# Patient Record
Sex: MALE | Race: WHITE
[De-identification: names, ages, dates, MRNs, and addresses within clinical notes are randomized per-mention and may not be internally consistent; named-entity substitution may affect disease eponyms.]

---

## 2018-01-02 ENCOUNTER — HOSPITAL ENCOUNTER (INPATIENT)
Dept: HOSPITAL 31 - C.ER | Age: 78
LOS: 9 days | Discharge: HOME | DRG: 871 | End: 2018-01-11
Attending: FAMILY MEDICINE | Admitting: FAMILY MEDICINE
Payer: MEDICARE

## 2018-01-02 VITALS — BODY MASS INDEX: 26.9 KG/M2

## 2018-01-02 DIAGNOSIS — Z79.4: ICD-10-CM

## 2018-01-02 DIAGNOSIS — J18.9: ICD-10-CM

## 2018-01-02 DIAGNOSIS — Z95.0: ICD-10-CM

## 2018-01-02 DIAGNOSIS — J40: ICD-10-CM

## 2018-01-02 DIAGNOSIS — I48.2: ICD-10-CM

## 2018-01-02 DIAGNOSIS — E78.00: ICD-10-CM

## 2018-01-02 DIAGNOSIS — Z87.891: ICD-10-CM

## 2018-01-02 DIAGNOSIS — Z90.49: ICD-10-CM

## 2018-01-02 DIAGNOSIS — K22.10: ICD-10-CM

## 2018-01-02 DIAGNOSIS — K92.0: ICD-10-CM

## 2018-01-02 DIAGNOSIS — I10: ICD-10-CM

## 2018-01-02 DIAGNOSIS — I69.354: ICD-10-CM

## 2018-01-02 DIAGNOSIS — K29.70: ICD-10-CM

## 2018-01-02 DIAGNOSIS — E87.6: ICD-10-CM

## 2018-01-02 DIAGNOSIS — R65.20: ICD-10-CM

## 2018-01-02 DIAGNOSIS — N39.0: ICD-10-CM

## 2018-01-02 DIAGNOSIS — N28.1: ICD-10-CM

## 2018-01-02 DIAGNOSIS — R32: ICD-10-CM

## 2018-01-02 DIAGNOSIS — A41.89: Primary | ICD-10-CM

## 2018-01-02 DIAGNOSIS — I34.0: ICD-10-CM

## 2018-01-02 DIAGNOSIS — E11.9: ICD-10-CM

## 2018-01-02 DIAGNOSIS — K52.9: ICD-10-CM

## 2018-01-02 DIAGNOSIS — K56.7: ICD-10-CM

## 2018-01-02 LAB
ALBUMIN SERPL-MCNC: 4.4 G/DL (ref 3.5–5)
ALBUMIN/GLOB SERPL: 1.1 {RATIO} (ref 1–2.1)
ALT SERPL-CCNC: 13 U/L (ref 21–72)
AST SERPL-CCNC: 51 U/L (ref 17–59)
BASE EXCESS BLDV CALC-SCNC: -0.2 MMOL/L (ref 0–2)
BASE EXCESS BLDV CALC-SCNC: -11.2 MMOL/L (ref 0–2)
BASOPHILS # BLD AUTO: 0.1 K/UL (ref 0–0.2)
BASOPHILS NFR BLD: 0.3 % (ref 0–2)
BILIRUB UR-MCNC: NEGATIVE MG/DL
BUN SERPL-MCNC: 21 MG/DL (ref 9–20)
CALCIUM SERPL-MCNC: 8.9 MG/DL (ref 8.6–10.4)
EOSINOPHIL # BLD AUTO: 0 K/UL (ref 0–0.7)
EOSINOPHIL NFR BLD: 0.1 % (ref 0–4)
ERYTHROCYTE [DISTWIDTH] IN BLOOD BY AUTOMATED COUNT: 14.4 % (ref 11.5–14.5)
GFR NON-AFRICAN AMERICAN: 59
GLUCOSE UR STRIP-MCNC: (no result) MG/DL
HGB BLD-MCNC: 15.3 G/DL (ref 12–18)
LEUKOCYTE ESTERASE UR-ACNC: (no result) LEU/UL
LYMPHOCYTE: 8 % (ref 20–40)
LYMPHOCYTES # BLD AUTO: 1.7 K/UL (ref 1–4.3)
LYMPHOCYTES NFR BLD AUTO: 8.7 % (ref 20–40)
MAGNESIUM SERPL-MCNC: 1.8 MG/DL (ref 1.6–2.3)
MCH RBC QN AUTO: 26.6 PG (ref 27–31)
MCHC RBC AUTO-ENTMCNC: 33.2 G/DL (ref 33–37)
MCV RBC AUTO: 80.3 FL (ref 80–94)
MONOCYTE: 6 % (ref 0–10)
MONOCYTES # BLD: 1.8 K/UL (ref 0–0.8)
MONOCYTES NFR BLD: 9 % (ref 0–10)
NEUTROPHILS # BLD: 16 K/UL (ref 1.8–7)
NEUTROPHILS NFR BLD AUTO: 81.9 % (ref 50–75)
NEUTROPHILS NFR BLD AUTO: 85 % (ref 50–75)
NEUTS BAND NFR BLD: 1 % (ref 0–2)
NRBC BLD AUTO-RTO: 0 % (ref 0–2)
PCO2 BLDV: 33 MMHG (ref 40–60)
PCO2 BLDV: 37 MMHG (ref 40–60)
PH BLDV: 7.26 [PH] (ref 7.32–7.43)
PH BLDV: 7.42 [PH] (ref 7.32–7.43)
PH UR STRIP: 5 [PH] (ref 5–8)
PLATELET # BLD EST: NORMAL 10*3/UL
PLATELET # BLD: 155 K/UL (ref 130–400)
PMV BLD AUTO: 9.7 FL (ref 7.2–11.7)
PROT UR STRIP-MCNC: (no result) MG/DL
RBC # BLD AUTO: 5.75 MIL/UL (ref 4.4–5.9)
RBC # UR STRIP: (no result) /UL
RBC MORPH BLD: NORMAL
SP GR UR STRIP: 1.02 (ref 1–1.03)
SQUAMOUS EPITHIAL: < 1 /HPF (ref 0–5)
TOTAL CELLS COUNTED BLD: 100
TROPONIN I SERPL-MCNC: 0.04 NG/ML (ref 0–0.12)
URINE NITRATE: NEGATIVE
UROBILINOGEN UR-MCNC: NORMAL MG/DL (ref 0.2–1)
VENOUS BLOOD GAS PO2: 33 MM/HG (ref 30–55)
VENOUS BLOOD GAS PO2: 69 MM/HG (ref 30–55)
WBC # BLD AUTO: 19.5 K/UL (ref 4.8–10.8)

## 2018-01-02 RX ADMIN — HUMAN INSULIN SCH UNIT: 100 INJECTION, SOLUTION SUBCUTANEOUS at 17:24

## 2018-01-02 RX ADMIN — INSULIN GLARGINE SCH U: 100 INJECTION, SOLUTION SUBCUTANEOUS at 21:50

## 2018-01-02 RX ADMIN — Medication SCH MG: at 18:04

## 2018-01-02 RX ADMIN — HUMAN INSULIN SCH: 100 INJECTION, SOLUTION SUBCUTANEOUS at 21:40

## 2018-01-02 RX ADMIN — OMEGA-3-ACID ETHYL ESTERS SCH GM: 900 CAPSULE, LIQUID FILLED ORAL at 18:04

## 2018-01-02 NOTE — US
EXAM:

  US Retroperitoneal Limited, Renal



CLINICAL HISTORY:

  77 years old, male; Abnormal findings; Abnormal radiologic finding of the 

abdomen; Radiologic exam and body structure: CT; Additional info: Renal mass 

noted on CT chest



TECHNIQUE:

  Real-time ultrasound of the retroperitoneum (limited) with image 

documentation.



COMPARISON:

  No relevant prior studies available.



FINDINGS:

  Right kidney:  Normal echogenicity.  11.3 x 7.6 x 10.2 cm cyst.  1.3 x 1.3 x 

1.2 cm cyst.  No calculi.  No hydronephrosis.

  Left kidney:  Normal echogenicity.  1.3 x 1.0 x 0.9 cm cyst.  No calculi.  No 

hydronephrosis.

  Bladder:  Unremarkable.  Pre-void volume = 111 cc.

  Prostate:  3.2 x 2.6 x 3.8 cm in size.



IMPRESSION:     

1.  Renal cysts. If there remains clinical concern for renal mass, suggest MRI.

## 2018-01-02 NOTE — PCM.SEPTIC
<Davian Robledo R - Last Filed: 01/02/18 17:00>





Sepsis Progress Note





- Reassessment Type


Date of Evaluation: 01/02/18


Time of Evaluation: 13:00


Reassessment Type: Non-invasive reassessment





- Non Invasive Reassessment


Were the most recent vital sign reviewed: Yes


Vital Sign (Latest): 


 











Temp Pulse Resp BP Pulse Ox


 


 98.9 F   101 H  20   128/57 L  96 


 


 01/02/18 16:30  01/02/18 16:30  01/02/18 16:30  01/02/18 16:30  01/02/18 16:30








Cardiovascular: Yes: Tachycardia, Irregularly Irregular


Respiratory: Yes: Decreased Breath Sounds, Rhonchi


Capillary Refill: Normal (Less than 2 sec)


Pulses: Normal Radial, Normal Dorsalis Pedis, Normal Posterior Tibialis


Skin: Normal Color, Warm





- Invasive Reassessment (complete 2 of 4)


Was a Central Venous Pressure Measurement obtained within 6 Hours after the 

presentation of septic shock: No


Was a central venous oxygen measurement obtained within 6 hours after the 

presentation of septic shock: No


Was a bedside cardiovascular ultrasound performed within 6 hours after the 

presentation of septic shock: No


Was a passive leg raise performed or was a fluid challenge performed within 6 

hrs of the initial fluid bolus: No





<Juliann Bird V - Last Filed: 01/02/18 22:12>





Sepsis Progress Note





- Non Invasive Reassessment


Vital Sign (Latest): 


 











Temp Pulse Resp BP Pulse Ox


 


 99.1 F   110 H  18   140/71   93 L


 


 01/02/18 18:21  01/02/18 20:26  01/02/18 18:21  01/02/18 18:21  01/02/18 18:21











Attending/Attestation





- Attestation


I have personally seen and examined this patient.: Yes


I have fully participated in the care of the patient.: Yes


I have reviewed all pertinent clinical information, including history, physical 

exam and plan: Yes


Notes (Text): 


Patient given 2 IV abx in the ED on admission.


Lactate acid: 2.2--1.7


Patient was given 1 L NS bolus by ED at code sepsis


Patient started on gentle IV hydration given concern for CHF.


Infectious Disease: Dr. Quinteros on consult


Procalcitonin: 0.80


Pending cultures.

## 2018-01-02 NOTE — RAD
HISTORY:

fever  



COMPARISON:

No prior. 



FINDINGS:



LUNGS:

Shallow lung volumes



Limited visualization of the left costophrenic angle. Elsewhere clear 

lungs without consolidation



PLEURA:

Small left pleural effusion not excluded no large left pleural 

effusion suggested. No pneumothorax apparent.



CARDIOVASCULAR:

Cardiomegaly Position/ configuration of pacemaker

Satisfactory. Central pulmonary vasculature probable top-normal - 

given shallow inspiration 



OSSEOUS STRUCTURES:

No significant abnormalities.



VISUALIZED UPPER ABDOMEN:

Normal.



OTHER FINDINGS:

None.



IMPRESSION:

No consolidation 



cardiomegaly with limited visualization of the left costophrenic 

angle - sign here minimal left pleural effusion not excluded

## 2018-01-02 NOTE — C.PDOC
History Of Present Illness


78 y/o male with pacemaker implanted and history of DM and high cholesterol 

presents to ED with complaints of generalized weakness and subjective fever 

since yesterday. Patient denies chest pain, sob, nausea, vomiting or any other 

complaints at this time. 


Time Seen by Provider: 18 10:53


Chief Complaint (Nursing): Weakness/Neurological Deficit


History Per: Patient, Family


History/Exam Limitations: no limitations


Onset/Duration Of Symptoms: Days


Current Symptoms Are (Timing): Still Present





Past Medical History


Reviewed: Historical Data, Nursing Documentation, Vital Signs


Vital Signs: 


 Last Vital Signs











Temp  101.1 F H  18 12:03


 


Pulse  112 H  18 12:41


 


Resp  16   18 12:41


 


BP  127/60   18 12:41


 


Pulse Ox  93 L  18 12:41














- Medical History


PMH: Cardia Arrhythmia, CVA (with residual left side weakness), Diabetes, HTN, 

Hypercholesterolemia, Hyperlipidemia


Surgical History: Cholecystectomy, Pacemaker





- [facility] Procedures








SUTURE OF LIP LACERATION (08/16/15)








Family History: States: No Known Family Hx





- Social History


Hx Alcohol Use: No (Former drinker)


Hx Substance Use: No





- Immunization History


Hx Tetanus Toxoid Vaccination:  (Updated last year)


Hx Influenza Vaccination: No


Hx Pneumococcal Vaccination: No





Review Of Systems


Constitutional: Positive for: Fever.  Negative for: Chills


Gastrointestinal: Negative for: Abdominal Pain


Skin: Negative for: Rash


Neurological: Positive for: Weakness





Physical Exam





- Physical Exam


Additional Physical Exam Comments: 


Constitutional: No acute distress. WDWN. 


Head: Normocephalic.  Atraumatic.  


Eyes:  PERRL. EOMI. 


ENT:  Oral mucousa dry. Chapped lips 


Neck:  Supple.


Cardiovascular:  Occasional irregular beats. 


Chest: No tenderness. Pacemaker implanted to left upper chest wall 


Respiratory:  Clear to auscultation bilaterally. Poor inspiration 


GI:  Soft. Nontender. Nondistended.  No rebound. No guarding.


Back:  No CVA and no mid-line tenderness.


Musculoskeletal:  No tenderness or swelling of extremities.


Skin:  No rash. 


Neurologic:  Alert, no focal deficit. Left hand paralyzed. 








ED Course And Treatment





- Laboratory Results


Result Diagrams: 


 18 11:19





 18 11:19


ECG: Interpreted By Me, Viewed By Me


ECG Rhythm: Sinus Tachycardia


Interpretation Of ECG: Left axis deviation, Left ventricular hypertrophy with 

repolarization abnormality


Rate From EC (BPM)


O2 Sat by Pulse Oximetry: 93 (RA)


Pulse Ox Interpretation: Normal





Medical Decision Making


Medical Decision Making: 


Plan: ECG, Blood work, CXR, UA, Swallow test ordered





[place]: Patient's lactate 2.2 Code sepsis activated and protocol followed 





discussed with Dr Bird, ordered repeat lactate and influenza.  will admot to 

her in tele. 





Disposition


Discussed With Dr.: Juliann Bird


Doctor Will See Patient In The: Hospital





- Disposition


Disposition Time: 12:45


Condition: SERIOUS


Forms:  [facility] ([place])





- Clinical Impression


Clinical Impression: 


 Sepsis








- PA / NP / Resident Statement


MD/DO has reviewed & agrees with the documentation as recorded.





- Scribe Statement


The provider has reviewed the documentation as recorded by the Scribazam Walters





All medical record entries made by the Scribe were at my direction and 

personally dictated by me. I have reviewed the chart and agree that the record 

accurately reflects my personal performance of the history, physical exam, 

medical decision making, and the department course for this patient. I have 

also personally directed, reviewed, and agree with the discharge instructions 

and disposition.





Decision To Admit





- Pt Status Changed To:


Hospital Disposition Of: Inpatient





- Admit Certification


Admit to Inpatient:: After my assessment, the patient will require 

hospitalization for at least two midnights.  This is because of the severity of 

symptoms shown, intensity of services needed, and/or the medical risk in this 

patient being treated as an outpatient.





- InPatient:


Physician Admission Certification: I certify that this patient requires 2 or 

more midnights of care for the following reason:: tx for sepsis, iv antibiotics





- .


Bed Request Type: Telemetry


Admitting Physician: Juliann Bird


Patient Diagnosis: 


 Sepsis

## 2018-01-02 NOTE — CT
PROCEDURE:  CT Chest without contrast



HISTORY:

suspect pneumonia; fever



COMPARISON:

None.



TECHNIQUE:

Contiguous axial images were obtained through the chest without 

intravenous contrast enhancement. Sagittal and coronal 

reconstructions were performed.







Radiation dose (DLP): 397 mGy-cm. 



This CT exam was performed using one or more of the following dose 

reduction techniques: Automated exposure control, adjustment of the 

mA and/or kV according to patient size, and/or use of iterative 

reconstruction technique.



FINDINGS:



LUNGS:

Clear lungs. Visualized airway clear. Mild bibasilar peripheral 

subpleural septal lines noted - an element of interstitial lung 

disease inferred. No consolidative infiltrate appreciated. 



Minimal hypo ventilatory changes at both lung bases 



MEDIASTINUM:

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 

cm. Descending thoracic aorta also minimally prominent at 3.5 cm . 

Cardiomegaly.  No pericardial effusion Position/ configuration of 

pacemaker

unremarkable. No vascular congestion. No lymphadenopathy. 

Atherosclerotic aortic vascular disease 



PLEURA:

No pleural fluid. No pneumothorax.



BONES:

. 



No fracture. No destructive lesion. 



UPPER ABDOMEN:

Bilateral renal mostly hypodense masses - -the full extent and there 

characterisation - not completely assessed 



 Posterior right upper renal pole cortical 9 mm hyperdensity 

impossible hyperdense enter hemorrhagic right renal cyst.  Other 

including neoplastic renal mass is not excluded.  Another slightly 

less conspicuous hyperdense mass anterior right renal midpole (axis 

series 4, image 128) partially visualized nonspecific perirenal mild 

inflammatory changes in the chronicity is unknown 



Cholecystectomy clips 



Descending abdominal aortic atherosclerotic vascular calcifications 

-renal artery takeoffs and superior mesenteric and celiac artery 

takeoffs. 



OTHER FINDINGS:

None.



IMPRESSION:

No consolidative infiltrate suggested.Mild bibasilar peripheral 

subpleural septal lines noted - an element of interstitial lung 

disease inferred.



Bilateral renal masses -although most of the renal masses are 

hypodense, their  full extent is not included . There are least 2 

right renal masses that appear hyperdense.  As per this exam all 

renal masses are indeterminate and further evaluation is advised. 

Recommend comparison with earlier outside exams, if available, to 

assure the stability . If not consider follow-up renal ultrasound 

initially to address the 2 hyperdense lesions. Ultimately triple 

phase CT abdomen renal study may be needed probable. Left renal 

vascular type calcifications.  No gross hydronephrosis appreciated ; 

few small left parapelvic renal cysts possible. Bilateral nonspecific 

mild perirenal stranding inflammatory changes present 



Status postcholecystectomy

## 2018-01-02 NOTE — CP.PCM.HP
<Davian Robledo - Last Filed: 18 15:16>





History of Present Illness





- History of Present Illness


History of Present Illness: 


CC: "My father is weak and warm to the touch"





HPI: Mr Barillas is a 77 year old  male who was brought to the ER by 

family for weakness and subjective fever; his daughter provided the history of 

present illness. The patient has a past medical history of AFib, CVA with 

residual left sided weakness, DM, HTN, and HLD. Per daughter the patient took a 

shower yesterday and following the shower the patient began to have chills. He 

usually ambulates with a cane, however he was too weak to ambulate and spent 

much of the day in bed. He was also warm to the touch however a temperature was 

not taken at home. The patient's wife was sick with a cold a few days prior. He 

has urinary incontinence at baseline and had an episode of urinary incontinence 

yesterday as well. There was no cough, diarrhea, recent travel. There was no 

chest pain, abdominal pain, nausea, vomiting, no new focal deficits.





PMD: Dr Dent


PMHx: AFib, CVA in  w/ residual left sided weakness; DM, HTN, HLD


PSHx: Pacemaker implanted ; Cholecystectomy 


Allergies: NKA


SocialHx: 20 pack year smoking history - currently non-smoker; EtOH use in past 

- currently non-drinker; no illicit substances; lives at home with wife - 

caretaker comes home and assists in ADL


FamHx: Father , had an MI; Mother , had cardiac problems


Home medications (verified with PMD's office): Potassium 10meq QD; amlodipine 

10mg QD; amiodarone 100mg QD; benzepril 40mg QD; simvastatin 40mg HS; lovaza 2 

cap QD; lantus 22u HS; ASA 81mg QD; metoprolol sccinate 50mg QD; metformin 

1000mg BID; atorvastatin 20mg HS; januvia 100mg QD; oxybutynin 5mg ER QD; 

Glimepiride 4mg AM; 








Present on Admission





- Present on Admission


Any Indicators Present on Admission: No





Review of Systems





- Constitutional


Constitutional: Chills, Fever, Weakness





- EENT


Eyes: absent: Change in Vision


Ears: absent: Ear Pain


Nose/Mouth/Throat: Dry Mouth





- Cardiovascular


Cardiovascular: absent: Chest Pain, Chest Pain at Rest, Dyspnea





- Respiratory


Respiratory: absent: Cough, Wheezing





- Gastrointestinal


Gastrointestinal: absent: Abdominal Pain, Constipation, Diarrhea, Nausea, 

Vomiting





- Genitourinary


Genitourinary: absent: Dysuria





- Integumentary


Integumentary: absent: Bleeding Lesions





- Neurological


Neurological: absent: Dizziness





Past Patient History





- Past Social History


Smoking Status: Former Smoker





- CARDIAC


Hx Cardia Arrhythmia: Yes


Hx Hypercholesterolemia: Yes


Hx Hypertension: Yes


Hx Pacemaker: Yes





- PULMONARY


Hx Respiratory Disorders: No





- NEUROLOGICAL


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





- HEENT


Hx HEENT Problems: No





- RENAL


Hx Chronic Kidney Disease: No





- ENDOCRINE/METABOLIC


Hx Diabetes Mellitus Type 2: Yes





- HEMATOLOGICAL/ONCOLOGICAL


Hx Blood Disorders: No





- INTEGUMENTARY


Hx Dermatological Problems: No





- MUSCULOSKELETAL/RHEUMATOLOGICAL


Hx Musculoskeletal Disorders: No


Hx Falls: Yes





- GASTROINTESTINAL


Hx Gastrointestinal Disorders: No





- GENITOURINARY/GYNECOLOGICAL


Hx Genitourinary Disorders: No





- PSYCHIATRIC


Hx Substance Use: No





- SURGICAL HISTORY


Hx Cholecystectomy: Yes





- ANESTHESIA


Hx Anesthesia: Yes


Hx Anesthesia Reactions: No


Hx Malignant Hyperthermia: No





Meds


Allergies/Adverse Reactions: 


 Allergies











Allergy/AdvReac Type Severity Reaction Status Date / Time


 


No Known Allergies Allergy   Verified 18 10:33














Physical Exam





- Constitutional


Appears: Toxic, No Acute Distress





- Head Exam


Head Exam: ATRAUMATIC, NORMAL INSPECTION





- Eye Exam


Eye Exam: EOMI


Pupil Exam: PERRL





- ENT Exam


ENT Exam: absent: Normal Oropharynx


Additional comments: 


Yellow lesions covering tongue that can be easily wiped away to reveal a normal 

mucosa underneath








- Neck Exam


Neck exam: Positive for: Normal Inspection





- Respiratory Exam


Respiratory Exam: Decreased Breath Sounds, Rhonchi





- Cardiovascular Exam


Cardiovascular Exam: Tachycardia, Irregular Rhythm.  absent: JVD, Systolic 

Murmur





- GI/Abdominal Exam


GI & Abdominal Exam: Normal Bowel Sounds, Soft.  absent: Guarding, Rebound, 

Tenderness


Additional comments: 


Protuberant abdomen








- Extremities Exam


Extremities exam: Positive for: normal capillary refill.  Negative for: full ROM





- Neurological Exam


Neurological exam: Alert, Motor Sensory Deficit, Oriented x3


Additional comments: 


Right arm 5/5 motor strength


Left arm 0/5 motor strength


Right leg 4/5 motor strength


Left leg 3/5 motor strength





Sensation decreased in left arm and left leg





- Psychiatric Exam


Additional comments: 


aphasia








- Skin


Skin Exam: Intact, Normal Color, Warm





Results





- Vital Signs


Recent Vital Signs: 





 Last Vital Signs











Temp  101.1 F H  18 12:03


 


Pulse  112 H  18 12:41


 


Resp  16   18 12:41


 


BP  127/60   18 12:41


 


Pulse Ox  93 L  18 12:46














- Labs


Result Diagrams: 


 18 11:19





 18 11:19


Labs: 





 Laboratory Results - last 24 hr











  18





  11:19 11:19 11:26


 


WBC  19.5 H  


 


RBC  5.75  


 


Hgb  15.3  


 


Hct  46.2  


 


MCV  80.3  


 


MCH  26.6 L  


 


MCHC  33.2  


 


RDW  14.4  


 


Plt Count  155  


 


MPV  9.7  


 


Neut % (Auto)  81.9 H  


 


Lymph % (Auto)  8.7 L  


 


Mono % (Auto)  9.0  


 


Eos % (Auto)  0.1  


 


Baso % (Auto)  0.3  


 


Neut #  16.0 H  


 


Lymph #  1.7  


 


Mono #  1.8 H  


 


Eos #  0.0  


 


Baso #  0.1  


 


Neutrophils % (Manual)  85 H  


 


Band Neutrophils %  1  


 


Lymphocytes % (Manual)  8 L  


 


Monocytes % (Manual)  6  


 


Platelet Estimate  Normal  


 


RBC Morphology  Normal  


 


pO2    33


 


VBG pH    7.42


 


VBG pCO2    37 L


 


VBG HCO3    23.9


 


VBG Total CO2    25.1


 


VBG O2 Sat (Calc)    74.6 H


 


VBG Base Excess    -0.2 L


 


VBG Potassium    3.7


 


Glucose    212 H


 


Lactate    2.2 H


 


Sodium   136  144.0


 


Potassium   5.0 


 


Chloride   101  105.0


 


Carbon Dioxide   23 


 


Anion Gap   17 


 


BUN   21 H 


 


Creatinine   1.2 


 


Est GFR ( Amer)   > 60 


 


Est GFR (Non-Af Amer)   59 


 


Random Glucose   202 H 


 


Calcium   8.9 


 


Phosphorus   


 


Magnesium   


 


Total Bilirubin   1.9 H 


 


AST   51 


 


ALT   13 L 


 


Alkaline Phosphatase   88 


 


Troponin I   


 


Total Protein   8.4 H 


 


Albumin   4.4 


 


Globulin   4.0 H 


 


Albumin/Globulin Ratio   1.1 


 


Venous Blood Potassium    3.7


 


Urine Color   


 


Urine Clarity   


 


Urine pH   


 


Ur Specific Gravity   


 


Urine Protein   


 


Urine Glucose (UA)   


 


Urine Ketones   


 


Urine Blood   


 


Urine Nitrate   


 


Urine Bilirubin   


 


Urine Urobilinogen   


 


Ur Leukocyte Esterase   


 


Urine WBC (Auto)   


 


Urine RBC (Auto)   


 


Ur Squamous Epith Cells   


 


Influenza Typ A,B (EIA)   














  18





  11:29 11:54 12:44


 


WBC   


 


RBC   


 


Hgb   


 


Hct   


 


MCV   


 


MCH   


 


MCHC   


 


RDW   


 


Plt Count   


 


MPV   


 


Neut % (Auto)   


 


Lymph % (Auto)   


 


Mono % (Auto)   


 


Eos % (Auto)   


 


Baso % (Auto)   


 


Neut #   


 


Lymph #   


 


Mono #   


 


Eos #   


 


Baso #   


 


Neutrophils % (Manual)   


 


Band Neutrophils %   


 


Lymphocytes % (Manual)   


 


Monocytes % (Manual)   


 


Platelet Estimate   


 


RBC Morphology   


 


pO2   


 


VBG pH   


 


VBG pCO2   


 


VBG HCO3   


 


VBG Total CO2   


 


VBG O2 Sat (Calc)   


 


VBG Base Excess   


 


VBG Potassium   


 


Glucose   


 


Lactate   


 


Sodium   


 


Potassium   


 


Chloride   


 


Carbon Dioxide   


 


Anion Gap   


 


BUN   


 


Creatinine   


 


Est GFR ( Amer)   


 


Est GFR (Non-Af Amer)   


 


Random Glucose   


 


Calcium   


 


Phosphorus   3.7 


 


Magnesium   1.8 


 


Total Bilirubin   


 


AST   


 


ALT   


 


Alkaline Phosphatase   


 


Troponin I   0.0370 


 


Total Protein   


 


Albumin   


 


Globulin   


 


Albumin/Globulin Ratio   


 


Venous Blood Potassium   


 


Urine Color  Yellow  


 


Urine Clarity  Clear  


 


Urine pH  5.0  


 


Ur Specific Gravity  1.019  


 


Urine Protein  1+ H  


 


Urine Glucose (UA)  1+ H  


 


Urine Ketones  Trace  


 


Urine Blood  2+ H  


 


Urine Nitrate  Negative  


 


Urine Bilirubin  Negative  


 


Urine Urobilinogen  Normal  


 


Ur Leukocyte Esterase  Trace  


 


Urine WBC (Auto)  6 H  


 


Urine RBC (Auto)  21 H  


 


Ur Squamous Epith Cells  < 1  


 


Influenza Typ A,B (EIA)    Negative for flu a/b














  18





  12:45


 


WBC 


 


RBC 


 


Hgb 


 


Hct 


 


MCV 


 


MCH 


 


MCHC 


 


RDW 


 


Plt Count 


 


MPV 


 


Neut % (Auto) 


 


Lymph % (Auto) 


 


Mono % (Auto) 


 


Eos % (Auto) 


 


Baso % (Auto) 


 


Neut # 


 


Lymph # 


 


Mono # 


 


Eos # 


 


Baso # 


 


Neutrophils % (Manual) 


 


Band Neutrophils % 


 


Lymphocytes % (Manual) 


 


Monocytes % (Manual) 


 


Platelet Estimate 


 


RBC Morphology 


 


pO2  69 H


 


VBG pH  7.26 L


 


VBG pCO2  33 L


 


VBG HCO3  16.0


 


VBG Total CO2  15.8 L


 


VBG O2 Sat (Calc)  90.6 H


 


VBG Base Excess  -11.2 L


 


VBG Potassium  6.1 H


 


Glucose  153 H


 


Lactate  1.7


 


Sodium  143.0


 


Potassium 


 


Chloride  125.0 H


 


Carbon Dioxide 


 


Anion Gap 


 


BUN 


 


Creatinine 


 


Est GFR ( Amer) 


 


Est GFR (Non-Af Amer) 


 


Random Glucose 


 


Calcium 


 


Phosphorus 


 


Magnesium 


 


Total Bilirubin 


 


AST 


 


ALT 


 


Alkaline Phosphatase 


 


Troponin I 


 


Total Protein 


 


Albumin 


 


Globulin 


 


Albumin/Globulin Ratio 


 


Venous Blood Potassium  6.1 H


 


Urine Color 


 


Urine Clarity 


 


Urine pH 


 


Ur Specific Gravity 


 


Urine Protein 


 


Urine Glucose (UA) 


 


Urine Ketones 


 


Urine Blood 


 


Urine Nitrate 


 


Urine Bilirubin 


 


Urine Urobilinogen 


 


Ur Leukocyte Esterase 


 


Urine WBC (Auto) 


 


Urine RBC (Auto) 


 


Ur Squamous Epith Cells 


 


Influenza Typ A,B (EIA) 














Assessment & Plan


(1) Sepsis


Assessment and Plan: 


Code Sepsis: 11:40 AM in ED. Criteria - elevated WBC, elevated HR, Fever, 

elevated Lactate. In the ED -> Aztreonam, Vancomycin, NS bolus.


Infectious Disease consult, Dr Quinteros


Flu NEGATIVE


F/U Blood culture, urine culture, sputum culture, throat culture


F/U strep pnumoniae Ag, mycoplasma IgM, legionella Ag


F/U repeat Lactic Acid


F/U Procalcitonin





Imaging:


F/U CT chest w/o contrast


F/U official report for CXR





Meds:


Tylenol 650mg PO Q6H PRN for fever


Zosyn 3.375mg IVP Q6H


Vancomycin 1g IVP QD


Status: Acute   Priority: High   





(2) Atrial fibrillation


Assessment and Plan: 


Hx of Pacemaker (or AICD)


Cardiology consult, Dr Ari BLEDSOE 5 -> 12.5% risk of event per yr w/o AO; AUSTEN 4 -> Patient at high risk 

for major bleeding


Aspirin 81mg PO QD


Metoprolol Succinate 50mg PO QD


Amiodarone 100mg PO QD


* F/U TSH/Free T4


Status: Chronic   Priority: High   





(3) Cardiomegaly


Assessment and Plan: 


Echo from  showed normal EF and some possible diastolic dysfunction


F/U Echo





Status: Acute   Priority: High   





(4) History of CVA with residual deficit


Assessment and Plan: 


Aspirin 81mg PO QD


Crestor 10 mg PO HS (Patient home Lipitor 20mg not carried in house)


Status: Chronic   Priority: Medium   





(5) Diabetes mellitus


Assessment and Plan: 


Accuchecks ACHS


RISS medium dose


Consistent carb diet


F/U HgbA1C


Glimepiride 4mg PO QD


Januvia 100mg PO QD


HOLD metformin 1000mg PO BID 2/2 mild elevation in lactate


Status: Chronic   Priority: Medium   





(6) HTN (hypertension)


Assessment and Plan: 


BP well controlled


Metoprolol succinate 50mg PO QD


Norvasc 10mg PO QD


HOLD home benazepril 40mg 2/2 to elevated potassium


Status: Chronic   Priority: High   





(7) Lipid disorder


Assessment and Plan: 


F/U lipid panel


Lovaza 1g PO BID


Crestor 10 mg PO HS (Patient home Lipitor 20mg not carried in house)


Status: Acute   





(8) Abnormal urinalysis


Assessment and Plan: 


History of Urinary Incontinence


Straight cath in ED with 200ml of normal appearing urine


Bladder scan PRN


F/U Urine culture


Oxybutynin XL 5mg PO QD


Status: Acute   





(9) Prophylactic measure


Assessment and Plan: 


Protonix 40mg PO QD


SCDs, Heparin 5000u SC Q8H


Florastor 250mg PO BID


PT/OT eval and treat


Status: Acute   





<Juliann Bird V - Last Filed: 18 22:09>





Results





- Vital Signs


Recent Vital Signs: 





 Last Vital Signs











Temp  99.1 F   18 18:21


 


Pulse  110 H  18 20:26


 


Resp  18   18 18:21


 


BP  140/71   18 18:21


 


Pulse Ox  93 L  18 18:21














- Labs


Result Diagrams: 


 18 11:19





 18 11:19


Labs: 





 Laboratory Results - last 24 hr











  18





  11:19 11:19 11:26


 


WBC  19.5 H  


 


RBC  5.75  


 


Hgb  15.3  


 


Hct  46.2  


 


MCV  80.3  


 


MCH  26.6 L  


 


MCHC  33.2  


 


RDW  14.4  


 


Plt Count  155  


 


MPV  9.7  


 


Neut % (Auto)  81.9 H  


 


Lymph % (Auto)  8.7 L  


 


Mono % (Auto)  9.0  


 


Eos % (Auto)  0.1  


 


Baso % (Auto)  0.3  


 


Neut #  16.0 H  


 


Lymph #  1.7  


 


Mono #  1.8 H  


 


Eos #  0.0  


 


Baso #  0.1  


 


Neutrophils % (Manual)  85 H  


 


Band Neutrophils %  1  


 


Lymphocytes % (Manual)  8 L  


 


Monocytes % (Manual)  6  


 


Platelet Estimate  Normal  


 


RBC Morphology  Normal  


 


pO2    33


 


VBG pH    7.42


 


VBG pCO2    37 L


 


VBG HCO3    23.9


 


VBG Total CO2    25.1


 


VBG O2 Sat (Calc)    74.6 H


 


VBG Base Excess    -0.2 L


 


VBG Potassium    3.7


 


Glucose    212 H


 


Lactate    2.2 H


 


Sodium   136  144.0


 


Potassium   5.0 


 


Chloride   101  105.0


 


Carbon Dioxide   23 


 


Anion Gap   17 


 


BUN   21 H 


 


Creatinine   1.2 


 


Est GFR ( Amer)   > 60 


 


Est GFR (Non-Af Amer)   59 


 


POC Glucose (mg/dL)   


 


Random Glucose   202 H 


 


Hemoglobin A1c   


 


Lactic Acid   


 


Calcium   8.9 


 


Phosphorus   


 


Magnesium   


 


Total Bilirubin   1.9 H 


 


AST   51 


 


ALT   13 L 


 


Alkaline Phosphatase   88 


 


Troponin I   


 


Total Protein   8.4 H 


 


Albumin   4.4 


 


Globulin   4.0 H 


 


Albumin/Globulin Ratio   1.1 


 


Procalcitonin   


 


Free T4   


 


TSH 3rd Generation   


 


Venous Blood Potassium    3.7


 


Urine Color   


 


Urine Clarity   


 


Urine pH   


 


Ur Specific Gravity   


 


Urine Protein   


 


Urine Glucose (UA)   


 


Urine Ketones   


 


Urine Blood   


 


Urine Nitrate   


 


Urine Bilirubin   


 


Urine Urobilinogen   


 


Ur Leukocyte Esterase   


 


Urine WBC (Auto)   


 


Urine RBC (Auto)   


 


Ur Squamous Epith Cells   


 


Influenza Typ A,B (EIA)   


 


Ur L.pneumophila Ag   














  18





  11:29 11:54 12:44


 


WBC   


 


RBC   


 


Hgb   


 


Hct   


 


MCV   


 


MCH   


 


MCHC   


 


RDW   


 


Plt Count   


 


MPV   


 


Neut % (Auto)   


 


Lymph % (Auto)   


 


Mono % (Auto)   


 


Eos % (Auto)   


 


Baso % (Auto)   


 


Neut #   


 


Lymph #   


 


Mono #   


 


Eos #   


 


Baso #   


 


Neutrophils % (Manual)   


 


Band Neutrophils %   


 


Lymphocytes % (Manual)   


 


Monocytes % (Manual)   


 


Platelet Estimate   


 


RBC Morphology   


 


pO2   


 


VBG pH   


 


VBG pCO2   


 


VBG HCO3   


 


VBG Total CO2   


 


VBG O2 Sat (Calc)   


 


VBG Base Excess   


 


VBG Potassium   


 


Glucose   


 


Lactate   


 


Sodium   


 


Potassium   


 


Chloride   


 


Carbon Dioxide   


 


Anion Gap   


 


BUN   


 


Creatinine   


 


Est GFR ( Amer)   


 


Est GFR (Non-Af Amer)   


 


POC Glucose (mg/dL)   


 


Random Glucose   


 


Hemoglobin A1c   


 


Lactic Acid   


 


Calcium   


 


Phosphorus   3.7 


 


Magnesium   1.8 


 


Total Bilirubin   


 


AST   


 


ALT   


 


Alkaline Phosphatase   


 


Troponin I   0.0370 


 


Total Protein   


 


Albumin   


 


Globulin   


 


Albumin/Globulin Ratio   


 


Procalcitonin   


 


Free T4   


 


TSH 3rd Generation   


 


Venous Blood Potassium   


 


Urine Color  Yellow  


 


Urine Clarity  Clear  


 


Urine pH  5.0  


 


Ur Specific Gravity  1.019  


 


Urine Protein  1+ H  


 


Urine Glucose (UA)  1+ H  


 


Urine Ketones  Trace  


 


Urine Blood  2+ H  


 


Urine Nitrate  Negative  


 


Urine Bilirubin  Negative  


 


Urine Urobilinogen  Normal  


 


Ur Leukocyte Esterase  Trace  


 


Urine WBC (Auto)  6 H  


 


Urine RBC (Auto)  21 H  


 


Ur Squamous Epith Cells  < 1  


 


Influenza Typ A,B (EIA)    Negative for flu a/b


 


Ur L.pneumophila Ag   














  18





  12:45 14:31 14:31


 


WBC   


 


RBC   


 


Hgb   


 


Hct   


 


MCV   


 


MCH   


 


MCHC   


 


RDW   


 


Plt Count   


 


MPV   


 


Neut % (Auto)   


 


Lymph % (Auto)   


 


Mono % (Auto)   


 


Eos % (Auto)   


 


Baso % (Auto)   


 


Neut #   


 


Lymph #   


 


Mono #   


 


Eos #   


 


Baso #   


 


Neutrophils % (Manual)   


 


Band Neutrophils %   


 


Lymphocytes % (Manual)   


 


Monocytes % (Manual)   


 


Platelet Estimate   


 


RBC Morphology   


 


pO2  69 H  


 


VBG pH  7.26 L  


 


VBG pCO2  33 L  


 


VBG HCO3  16.0  


 


VBG Total CO2  15.8 L  


 


VBG O2 Sat (Calc)  90.6 H  


 


VBG Base Excess  -11.2 L  


 


VBG Potassium  6.1 H  


 


Glucose  153 H  


 


Lactate  1.7  


 


Sodium  143.0  


 


Potassium   


 


Chloride  125.0 H  


 


Carbon Dioxide   


 


Anion Gap   


 


BUN   


 


Creatinine   


 


Est GFR ( Amer)   


 


Est GFR (Non-Af Amer)   


 


POC Glucose (mg/dL)   


 


Random Glucose   


 


Hemoglobin A1c   


 


Lactic Acid   2.7 H 


 


Calcium   


 


Phosphorus   


 


Magnesium   


 


Total Bilirubin   


 


AST   


 


ALT   


 


Alkaline Phosphatase   


 


Troponin I   


 


Total Protein   


 


Albumin   


 


Globulin   


 


Albumin/Globulin Ratio   


 


Procalcitonin    0.80 H


 


Free T4   


 


TSH 3rd Generation   


 


Venous Blood Potassium  6.1 H  


 


Urine Color   


 


Urine Clarity   


 


Urine pH   


 


Ur Specific Gravity   


 


Urine Protein   


 


Urine Glucose (UA)   


 


Urine Ketones   


 


Urine Blood   


 


Urine Nitrate   


 


Urine Bilirubin   


 


Urine Urobilinogen   


 


Ur Leukocyte Esterase   


 


Urine WBC (Auto)   


 


Urine RBC (Auto)   


 


Ur Squamous Epith Cells   


 


Influenza Typ A,B (EIA)   


 


Ur L.pneumophila Ag   














  18





  16:31 16:31 16:31


 


WBC   


 


RBC   


 


Hgb   


 


Hct   


 


MCV   


 


MCH   


 


MCHC   


 


RDW   


 


Plt Count   


 


MPV   


 


Neut % (Auto)   


 


Lymph % (Auto)   


 


Mono % (Auto)   


 


Eos % (Auto)   


 


Baso % (Auto)   


 


Neut #   


 


Lymph #   


 


Mono #   


 


Eos #   


 


Baso #   


 


Neutrophils % (Manual)   


 


Band Neutrophils %   


 


Lymphocytes % (Manual)   


 


Monocytes % (Manual)   


 


Platelet Estimate   


 


RBC Morphology   


 


pO2   


 


VBG pH   


 


VBG pCO2   


 


VBG HCO3   


 


VBG Total CO2   


 


VBG O2 Sat (Calc)   


 


VBG Base Excess   


 


VBG Potassium   


 


Glucose   


 


Lactate   


 


Sodium   


 


Potassium   


 


Chloride   


 


Carbon Dioxide   


 


Anion Gap   


 


BUN   


 


Creatinine   


 


Est GFR ( Amer)   


 


Est GFR (Non-Af Amer)   


 


POC Glucose (mg/dL)   


 


Random Glucose   


 


Hemoglobin A1c    8.1 H


 


Lactic Acid   


 


Calcium   


 


Phosphorus   


 


Magnesium   


 


Total Bilirubin   


 


AST   


 


ALT   


 


Alkaline Phosphatase   


 


Troponin I   


 


Total Protein   


 


Albumin   


 


Globulin   


 


Albumin/Globulin Ratio   


 


Procalcitonin   


 


Free T4   1.29 


 


TSH 3rd Generation  0.67  


 


Venous Blood Potassium   


 


Urine Color   


 


Urine Clarity   


 


Urine pH   


 


Ur Specific Gravity   


 


Urine Protein   


 


Urine Glucose (UA)   


 


Urine Ketones   


 


Urine Blood   


 


Urine Nitrate   


 


Urine Bilirubin   


 


Urine Urobilinogen   


 


Ur Leukocyte Esterase   


 


Urine WBC (Auto)   


 


Urine RBC (Auto)   


 


Ur Squamous Epith Cells   


 


Influenza Typ A,B (EIA)   


 


Ur L.pneumophila Ag   














  18





  16:41 16:45 21:47


 


WBC   


 


RBC   


 


Hgb   


 


Hct   


 


MCV   


 


MCH   


 


MCHC   


 


RDW   


 


Plt Count   


 


MPV   


 


Neut % (Auto)   


 


Lymph % (Auto)   


 


Mono % (Auto)   


 


Eos % (Auto)   


 


Baso % (Auto)   


 


Neut #   


 


Lymph #   


 


Mono #   


 


Eos #   


 


Baso #   


 


Neutrophils % (Manual)   


 


Band Neutrophils %   


 


Lymphocytes % (Manual)   


 


Monocytes % (Manual)   


 


Platelet Estimate   


 


RBC Morphology   


 


pO2   


 


VBG pH   


 


VBG pCO2   


 


VBG HCO3   


 


VBG Total CO2   


 


VBG O2 Sat (Calc)   


 


VBG Base Excess   


 


VBG Potassium   


 


Glucose   


 


Lactate   


 


Sodium   


 


Potassium   


 


Chloride   


 


Carbon Dioxide   


 


Anion Gap   


 


BUN   


 


Creatinine   


 


Est GFR ( Amer)   


 


Est GFR (Non-Af Amer)   


 


POC Glucose (mg/dL)  193 H   255 H


 


Random Glucose   


 


Hemoglobin A1c   


 


Lactic Acid   


 


Calcium   


 


Phosphorus   


 


Magnesium   


 


Total Bilirubin   


 


AST   


 


ALT   


 


Alkaline Phosphatase   


 


Troponin I   


 


Total Protein   


 


Albumin   


 


Globulin   


 


Albumin/Globulin Ratio   


 


Procalcitonin   


 


Free T4   


 


TSH 3rd Generation   


 


Venous Blood Potassium   


 


Urine Color   


 


Urine Clarity   


 


Urine pH   


 


Ur Specific Gravity   


 


Urine Protein   


 


Urine Glucose (UA)   


 


Urine Ketones   


 


Urine Blood   


 


Urine Nitrate   


 


Urine Bilirubin   


 


Urine Urobilinogen   


 


Ur Leukocyte Esterase   


 


Urine WBC (Auto)   


 


Urine RBC (Auto)   


 


Ur Squamous Epith Cells   


 


Influenza Typ A,B (EIA)   


 


Ur L.pneumophila Ag   Negative 














Attending/Attestation





- Attestation


I have personally seen and examined this patient.: Yes


I have fully participated in the care of the patient.: Yes


I have reviewed all pertinent clinical information: Yes


Notes (Text): 


Patient seen, examined, and case discussed with day-time resident.


Patient see with patient's daughter at bedside at [facility] Bed 12 in ED.


Case discussed with day-time resident. Admitting orders discussed with day-time 

resident.


Assessment and plan discussed with day-time resident.





Assessment/Plan


(1) Sepsis


Assessment and Plan: 


* Code Sepsis: 11:40 AM in ED. Criteria - elevated WBC, elevated HR, Fever, 

elevated Lactate. In the ED -> Aztreonam, Vancomycin, NS bolus.


* Patient not given fluid as 30 mg/kg given concern for congestive heart 

failure.


* Infectious Disease consult, Dr Quinteros


* Flu NEGATIVE


* F/U Blood culture, urine culture, sputum culture, throat culture


* F/U strep pnumoniae Ag, mycoplasma IgM, legionella Ag


* F/U repeat Lactic Acid in 3hours


* F/U Procalcitonin


* suspecting pneumonia and urinary tract infection


 Imaging:


 * F/U CT chest w/o contrast


 * F/U official report for CXR


* Duonebs PRN shortness of breathe


* Oxygen 3 Liter for nasal cannula


Meds:


Tylenol 650mg PO Q6H PRN for fever


Zosyn 2.25 mg IVP Q8H


Vancomycin 1g IVP Q24H





Status: Acute   Priority: High   





(2) History of Atrial fibrillation


Assessment and Plan: 


* Hx of Pacemaker (or AICD)


* Cardiology consult, Dr Chapman


* CHADS 5 -> 12.5% risk of event per yr w/o AO; HASBLED 4 -> Patient at high 

risk for major bleeding


* Aspirin 81mg PO QDaily


* Metoprolol Succinate 50mg PO QDaily


* Amiodarone 100mg PO QDaily


* F/U TSH/Free T4


* Patient/patient's daughter/wife cannot remember who his cardiologist is


Status: Chronic   Priority: High   





(3) Cardiomegaly


Assessment and Plan: 


* Echo from  showed normal EF and some possible diastolic dysfunction


* F/U Echo


* Gentle IV hydration


* CT Chest r/o pleural effusion


Status: Acute   Priority: High   





(4) History of CVA with residual deficit


Assessment and Plan: 


* Aspirin 81mg PO QD


* Crestor 10 mg PO HS (Patient home Lipitor 20mg not carried in house)


* Blood pressure control


Status: Chronic   Priority: Medium   





(5) Diabetes mellitus


Assessment and Plan: 


* Accuchecks ACHS


* RISS medium dose


* Consistent carb diet


* F/U HgbA1C


* Glimepiride 4mg PO QDaily


* Januvia 100mg PO QDaily


* HOLD metformin 1000mg PO BID 2/2 mild elevation in lactate


Status: Chronic   Priority: Medium   





(6) HTN (hypertension)


Assessment and Plan: 


* BP well controlled


* Metoprolol succinate 50mg PO QDaily


* Norvasc 10mg PO QDaily


* HOLD home benazepril 40mg 2/2 to elevated potassium


Status: Chronic   Priority: High   





(7) Lipid disorder


Assessment and Plan: 


* F/U lipid panel in AM


* Lovaza 1g PO BID


* Crestor 10 mg PO HS (Patient home Lipitor 20mg not carried in house)


Status: Chronic 





(8) Abnormal urinalysis


Assessment and Plan: 


* History of Urinary Incontinence


* Straight cath in ED with 200ml of normal appearing urine given he appeared he 

had difficulty urinating in urinal


* Bladder scan PRN


* F/U Urine culture


* c/w home medication:Oxybutynin XL 5mg PO QDaily


* Note in patient's CT chest comments on renal masses? Will order renal US/

Bladder scan. Consult urology on call.


Status: Acute   





(9) Prophylactic measure


Assessment and Plan: 


* Protonix 40mg PO QD


* SCDs


* Heparin 5000u SC Q8H


* Florastor 250mg PO BID


* PT/OT eval and treat

## 2018-01-03 LAB
ALBUMIN SERPL-MCNC: 3.3 G/DL (ref 3.5–5)
ALBUMIN/GLOB SERPL: 1 {RATIO} (ref 1–2.1)
ALT SERPL-CCNC: 23 U/L (ref 21–72)
AST SERPL-CCNC: 28 U/L (ref 17–59)
BASOPHILS # BLD AUTO: 0 K/UL (ref 0–0.2)
BASOPHILS NFR BLD: 0.2 % (ref 0–2)
BUN SERPL-MCNC: 18 MG/DL (ref 9–20)
CALCIUM SERPL-MCNC: 8.1 MG/DL (ref 8.6–10.4)
EOSINOPHIL # BLD AUTO: 0.1 K/UL (ref 0–0.7)
EOSINOPHIL NFR BLD: 0.3 % (ref 0–4)
ERYTHROCYTE [DISTWIDTH] IN BLOOD BY AUTOMATED COUNT: 14.4 % (ref 11.5–14.5)
GFR NON-AFRICAN AMERICAN: 54
HDLC SERPL-MCNC: 27 MG/DL (ref 30–70)
HGB BLD-MCNC: 13.2 G/DL (ref 12–18)
LDLC SERPL-MCNC: < 30 MG/DL (ref 0–129)
LYMPHOCYTES # BLD AUTO: 2 K/UL (ref 1–4.3)
LYMPHOCYTES NFR BLD AUTO: 10.1 % (ref 20–40)
MAGNESIUM SERPL-MCNC: 2 MG/DL (ref 1.6–2.3)
MCH RBC QN AUTO: 26.4 PG (ref 27–31)
MCHC RBC AUTO-ENTMCNC: 32.6 G/DL (ref 33–37)
MCV RBC AUTO: 81.1 FL (ref 80–94)
MONOCYTES # BLD: 1.8 K/UL (ref 0–0.8)
MONOCYTES NFR BLD: 9.1 % (ref 0–10)
NEUTROPHILS # BLD: 15.5 K/UL (ref 1.8–7)
NEUTROPHILS NFR BLD AUTO: 80.3 % (ref 50–75)
NRBC BLD AUTO-RTO: 0 % (ref 0–2)
PLATELET # BLD: 164 K/UL (ref 130–400)
PMV BLD AUTO: 9.2 FL (ref 7.2–11.7)
RBC # BLD AUTO: 5 MIL/UL (ref 4.4–5.9)
WBC # BLD AUTO: 19.4 K/UL (ref 4.8–10.8)

## 2018-01-03 RX ADMIN — HUMAN INSULIN SCH: 100 INJECTION, SOLUTION SUBCUTANEOUS at 21:58

## 2018-01-03 RX ADMIN — TAZOBACTAM SODIUM AND PIPERACILLIN SODIUM SCH MLS/HR: 250; 2 INJECTION, SOLUTION INTRAVENOUS at 00:07

## 2018-01-03 RX ADMIN — METOPROLOL SUCCINATE SCH MG: 50 TABLET, EXTENDED RELEASE ORAL at 09:35

## 2018-01-03 RX ADMIN — Medication SCH MG: at 09:35

## 2018-01-03 RX ADMIN — HUMAN INSULIN SCH UNIT: 100 INJECTION, SOLUTION SUBCUTANEOUS at 08:50

## 2018-01-03 RX ADMIN — TAZOBACTAM SODIUM AND PIPERACILLIN SODIUM SCH MLS/HR: 250; 2 INJECTION, SOLUTION INTRAVENOUS at 09:00

## 2018-01-03 RX ADMIN — TAZOBACTAM SODIUM AND PIPERACILLIN SODIUM SCH MLS/HR: 250; 2 INJECTION, SOLUTION INTRAVENOUS at 17:18

## 2018-01-03 RX ADMIN — HUMAN INSULIN SCH UNIT: 100 INJECTION, SOLUTION SUBCUTANEOUS at 17:19

## 2018-01-03 RX ADMIN — INSULIN GLARGINE SCH U: 100 INJECTION, SOLUTION SUBCUTANEOUS at 21:53

## 2018-01-03 RX ADMIN — OMEGA-3-ACID ETHYL ESTERS SCH GM: 900 CAPSULE, LIQUID FILLED ORAL at 17:19

## 2018-01-03 RX ADMIN — Medication SCH MG: at 17:19

## 2018-01-03 RX ADMIN — HUMAN INSULIN SCH UNIT: 100 INJECTION, SOLUTION SUBCUTANEOUS at 12:38

## 2018-01-03 RX ADMIN — VANCOMYCIN HYDROCHLORIDE SCH MLS/HR: 1 INJECTION, POWDER, LYOPHILIZED, FOR SOLUTION INTRAVENOUS at 12:37

## 2018-01-03 RX ADMIN — OMEGA-3-ACID ETHYL ESTERS SCH GM: 900 CAPSULE, LIQUID FILLED ORAL at 09:35

## 2018-01-03 NOTE — CP.PCM.CON
History of Present Illness





- History of Present Illness


History of Present Illness: 





I was asked to see patient by Dr Bird.





Patient is a 77 year old male with PMH HTN, hypercholesterolemia, atrial 

fibrillation who presents with weakness and lethargy.  The patient had 

subjective fever and was brought to [facility].  The patient was in atrial 

fibrillation bu on telemetry remains in sinus rhythm.





Review of Systems





- Constitutional


Constitutional: Fever, Weakness





- EENT


Eyes: absent: As Per HPI, Blind Spots, Blurred Vision, Change in Vision, 

Decreased Night Vision, Diplopia, Discharge, Dry Eye, Exophthalmos, Floaters, 

Irritation, Itchy Eyes, Loss of Peripheral Vision, Pain, Photophobia, Requires 

Corrective Lenses, Sees Flashes, Spots in Vision, Tunnel Vision, Other Visual 

Disturbances, Loss of Vision, Other


Ears: absent: As Per HPI, Decreased Hearing, Ear Discharge, Ear Pain, Tinnitus, 

Abnormal Hearing, Disequilibrium, Dizziness, Other


Nose/Mouth/Throat: absent: As Per HPI, Epistaxis, Nasal Congestion, Nasal 

Discharge, Nasal Obstruction, Nasal Trauma, Nose Pain, Post Nasal Drip, Sinus 

Pain, Sinus Pressure, Bleeding Gums, Change in Voice, Dental Pain, Dry Mouth, 

Dysphagia, Halitosis, Hoarsness, Lip Swelling, Mouth Lesions, Mouth Pain, 

Odynophagia, Sore Throat, Throat Swelling, Tongue Swelling, Facial Pain, Neck 

Pain, Neck Mass, Other





- Cardiovascular


Cardiovascular: Dyspnea, Palpitations





- Respiratory


Respiratory: Cough





- Gastrointestinal


Gastrointestinal: absent: As Per HPI, Abdominal Pain, Belching, Bloating, 

Change in Bowel Habits, Change in Stool Character, Coffee Ground Emesis, 

Constipation, Cramping, Diarrhea, Dyspepsia, Dysphagia, Early Satiety, 

Excessive Flatus, Fecal Incontinence, Heartburn, Hematemesis, Hematochezia, 

Loose Stools, Melena, Nausea, Odynophagia, Temesmus, Vomiting, Other





- Genitourinary


Genitourinary: absent: As Per HPI, Change in Urinary Stream, Difficulty 

Urinating, Dysuria, Flank Pain, Hematuria, Pyuria, Nocturia, Urinary 

Incontinence, Urinary Frequency, Urinary Hesitance, Urinary Urgency, Voiding 

Freq/Small Amts, Freq UTI, Hx Renal/Bladder Calculi, Hx /Renal Surgery, 

Bladder Distension, Other





- Musculoskeletal


Musculoskeletal: Tingling





- Integumentary


Integumentary: absent: As Per HPI, Acne, Alopecia, Bleeding Lesions, Change in 

Hair, Change in Nails, Change in Pigmentation, Changing Lesions, Dry Skin, 

Erythema, Furuncle, Hirsutism, Lesions, New Lesions, Non-Healing Lesions, 

Photosensitivity, Pruritus, Rash, Skin Pain, Skin Ulcer, Sores, Striae, Swelling

, Unusual Bruising, Wounds, Jaundice, Other





- Neurological


Neurological: absent: As Per HPI, Abnormal Gait, Abnormal Hearing, Abnormal 

Movements, Abnormal Speech, Behavioral Changes, Burning Sensations, Confusion, 

Convulsions, Disequilibrium, Dizziness, Numbness, Focal Weakness, Frequent Falls

, Headaches, Lack of Coordination, Loss of Vision, Memory Loss, Paresthesias, 

Radicular Pain, Restless Legs, Sensory Deficit, Syncope, Tingling, Tremor, 

Vertigo, Weakness, Other Visual Disturbances, Other





- Psychiatric


Psychiatric: absent: As Per HPI, Abnormal Sleep Pattern, Anhedonia, Anxiety, 

Auditory Hallucinations, Behavioral Changes, Change in Appetite, Change in 

Libido, Confusion, Depression, Difficulty Concentrating, Hallucinations, 

Homicidal Ideation, Hopelessness, Irritability, Memory Loss, Mood Swings, Panic 

Attacks, Paranoia, Suicidal Ideation, Visual Hallucinations, Tactile 

Hallucinations, Other





- Endocrine


Endocrine: absent: As Per HPI, Change in Body Appearance, Change in Libido, 

Cold Intolorance, Deepening of Voice, Excessive Sweating, Fatigue, Flushing, 

Heat Intolorance, Increase in Ring/Shoe/Hat Size, Palpitations, Polydipsia, 

Polyphagia, Polyuria, Other





- Hematologic/Lymphatic


Hematologic: absent: As Per HPI, Easy Bleeding, Easy Bruising, Lymphadenopathy, 

Other





Past Patient History





- Past Medical History & Family History


Past Medical History?: Yes





- Past Social History


Smoking Status: Former Smoker





- CARDIAC


Hx Cardiac Disorders: Yes (A.Fib)


Hx Hypercholesterolemia: Yes


Hx Hypertension: Yes





- PULMONARY


Hx Respiratory Disorders: No





- NEUROLOGICAL


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





- HEENT


Hx HEENT Problems: No





- RENAL


Hx Chronic Kidney Disease: No





- ENDOCRINE/METABOLIC


Hx Diabetes Mellitus Type 2: Yes





- HEMATOLOGICAL/ONCOLOGICAL


Hx Blood Disorders: No





- INTEGUMENTARY


Hx Dermatological Problems: No





- MUSCULOSKELETAL/RHEUMATOLOGICAL


Hx Falls: Yes





- GASTROINTESTINAL


Hx Gastrointestinal Disorders: No





- GENITOURINARY/GYNECOLOGICAL


Hx Genitourinary Disorders: No





- PSYCHIATRIC


Hx Substance Use: No





- SURGICAL HISTORY


Hx Surgeries: Yes


Hx Cholecystectomy: Yes





- ANESTHESIA


Hx Anesthesia: Yes


Hx Anesthesia Reactions: No


Hx Malignant Hyperthermia: No





Meds


Allergies/Adverse Reactions: 


 Allergies











Allergy/AdvReac Type Severity Reaction Status Date / Time


 


No Known Allergies Allergy   Verified 01/02/18 10:33














- Medications


Medications: 


 Current Medications





Acetaminophen (Tylenol 325mg Tab)  650 mg PO Q6 PRN


   PRN Reason: Fever >100.4 F


   Last Admin: 01/03/18 00:06 Dose:  650 mg


Albuterol/Ipratropium (Duoneb 3 Mg/0.5 Mg (3 Ml) Ud)  3 ml INH RQ6 PRN


   PRN Reason: Cough


Amiodarone HCl (Cordarone)  100 mg PO DAILY [facility]


   Last Admin: 01/03/18 11:30 Dose:  100 mg


Amlodipine Besylate (Norvasc)  10 mg PO DAILY [facility]


   Last Admin: 01/03/18 09:35 Dose:  10 mg


Aspirin (Aspirin Chewable)  81 mg PO DAILY [facility]


   Last Admin: 01/03/18 09:36 Dose:  81 mg


Dextrose (Dextrose 50% Inj)  0 ml IV STAT PRN; Protocol


   PRN Reason: Hypoglycemia Protocol


Dextrose (Glutose 15)  0 gm PO ONCE PRN; Protocol


   PRN Reason: Hypoglycemia Protocol


Glucagon (Glucagen Diagnostic Kit)  0 mg IM STAT PRN; Protocol


   PRN Reason: Hypoglycemia Protocol


Heparin Sodium (Porcine) (Heparin)  5,000 units SC Q12 [facility]


   Last Admin: 01/03/18 09:39 Dose:  5,000 units


Dextrose (Dextrose 5% In Water 1000 Ml)  1,000 mls @ 0 mls/hr IV .Q0M PRN; 

Protocol; Per Protocol


   PRN Reason: Hypoglycemia Protocol


Vancomycin/Sodium Chloride (Vancomycin 1 Gm/Ns 200 Ml)  1 gm in 200 mls @ 166.7 

mls/hr IVPB Q24H [facility]


   Stop: 01/08/18 10:01


   Last Admin: 01/03/18 12:37 Dose:  166.7 mls/hr


Sodium Chloride (Sodium Chloride 0.9%)  1,000 mls @ 75 mls/hr IV .[id number] [facility]


   Last Admin: 01/03/18 09:00 Dose:  75 mls/hr


Piperacillin Sod/Tazobactam Sod (Zosyn 2.25 Gm Iv Premix)  2.25 gm in 50 mls @ 

100 mls/hr IVPB Q8H [facility]


   Last Admin: 01/03/18 17:18 Dose:  100 mls/hr


Insulin Glargine (Lantus)  18 unit SC [facility]


   Last Admin: 01/02/18 21:50 Dose:  18 u


Insulin Human Regular (Novolin R)  0 unit SC [facility]


   PRN Reason: Protocol


   Last Admin: 01/03/18 17:19 Dose:  2 unit


Metoprolol Succinate (Toprol Xl)  50 mg PO DAILY [facility]


   Last Admin: 01/03/18 09:35 Dose:  50 mg


Omega-3-Acid Ethyl Esters (Lovaza)  1 gm PO BID [facility]


   Last Admin: 01/03/18 17:19 Dose:  1 gm


Oxybutynin Chloride (Ditropan Xl)  5 mg PO DAILY [facility]


   Last Admin: 01/03/18 11:30 Dose:  5 mg


Pantoprazole Sodium (Protonix Ec Tab)  40 mg PO DAILY [facility]


   Last Admin: 01/03/18 09:35 Dose:  40 mg


Rosuvastatin Calcium (Crestor)  10 mg PO [facility]


   Last Admin: 01/02/18 21:49 Dose:  10 mg


Saccharomyces Boulardii (Florastor)  250 mg PO BID [facility]


   Last Admin: 01/03/18 17:19 Dose:  250 mg


Sitagliptin Phosphate (Januvia)  50 mg PO DAILY [facility]


   Last Admin: 01/03/18 09:35 Dose:  50 mg











Physical Exam





- Constitutional


Appears: Non-toxic





- Head Exam


Head Exam: NORMAL INSPECTION





- Eye Exam


Eye Exam: Normal appearance





- ENT Exam


ENT Exam: Mucous Membranes Moist





- Neck Exam


Neck exam: Positive for: Full Rom





- Respiratory Exam


Respiratory Exam: Decreased Breath Sounds





- Cardiovascular Exam


Cardiovascular Exam: REGULAR RHYTHM





- GI/Abdominal Exam


GI & Abdominal Exam: Normal Bowel Sounds





- Rectal Exam


Rectal Exam: Deferred





- Extremities Exam


Extremities exam: Positive for: normal inspection





- Back Exam


Back exam: NORMAL INSPECTION





- Neurological Exam


Neurological exam: Alert, Oriented x3





- Psychiatric Exam


Psychiatric exam: Normal Affect





- Skin


Skin Exam: Normal Color





Results





- Vital Signs


Recent Vital Signs: 


 Last Vital Signs











Temp  97.8 F   01/03/18 15:55


 


Pulse  97 H  01/03/18 15:55


 


Resp  20   01/03/18 15:55


 


BP  145/77   01/03/18 15:55


 


Pulse Ox  96   01/03/18 15:55














- Labs


Result Diagrams: 


 01/03/18 07:40





 01/03/18 07:40


Labs: 


 Laboratory Results - last 24 hr











  01/02/18 01/03/18 01/03/18





  21:47 06:21 07:40


 


WBC   


 


RBC   


 


Hgb   


 


Hct   


 


MCV   


 


MCH   


 


MCHC   


 


RDW   


 


Plt Count   


 


MPV   


 


Neut % (Auto)   


 


Lymph % (Auto)   


 


Mono % (Auto)   


 


Eos % (Auto)   


 


Baso % (Auto)   


 


Neut #   


 


Lymph #   


 


Mono #   


 


Eos #   


 


Baso #   


 


Sodium    138


 


Potassium    3.4 L


 


Chloride    107


 


Carbon Dioxide    26


 


Anion Gap    9 L


 


BUN    18


 


Creatinine    1.3


 


Est GFR ( Amer)    > 60


 


Est GFR (Non-Af Amer)    54


 


POC Glucose (mg/dL)  255 H  170 H 


 


Random Glucose    174 H


 


Lactic Acid   


 


Calcium    8.1 L


 


Phosphorus   


 


Magnesium   


 


Total Bilirubin    1.2


 


AST    28


 


ALT    23


 


Alkaline Phosphatase    65


 


Total Protein    6.6


 


Albumin    3.3 L D


 


Globulin    3.3


 


Albumin/Globulin Ratio    1.0


 


Triglycerides    83


 


Cholesterol    83


 


LDL Cholesterol Direct    < 30


 


HDL Cholesterol    27 L


 


TSH 3rd Generation    1.45


 


Random Vancomycin   














  01/03/18 01/03/18 01/03/18





  07:40 07:40 07:40


 


WBC   19.4 H 


 


RBC   5.00 


 


Hgb   13.2  D 


 


Hct   40.5 


 


MCV   81.1 


 


MCH   26.4 L 


 


MCHC   32.6 L 


 


RDW   14.4 


 


Plt Count   164 


 


MPV   9.2 


 


Neut % (Auto)   80.3 H 


 


Lymph % (Auto)   10.1 L 


 


Mono % (Auto)   9.1 


 


Eos % (Auto)   0.3 


 


Baso % (Auto)   0.2 


 


Neut #   15.5 H 


 


Lymph #   2.0 


 


Mono #   1.8 H 


 


Eos #   0.1 


 


Baso #   0.0 


 


Sodium   


 


Potassium   


 


Chloride   


 


Carbon Dioxide   


 


Anion Gap   


 


BUN   


 


Creatinine   


 


Est GFR ( Amer)   


 


Est GFR (Non-Af Amer)   


 


POC Glucose (mg/dL)   


 


Random Glucose   


 


Lactic Acid    1.4


 


Calcium   


 


Phosphorus   


 


Magnesium   


 


Total Bilirubin   


 


AST   


 


ALT   


 


Alkaline Phosphatase   


 


Total Protein   


 


Albumin   


 


Globulin   


 


Albumin/Globulin Ratio   


 


Triglycerides   


 


Cholesterol   


 


LDL Cholesterol Direct   


 


HDL Cholesterol   


 


TSH 3rd Generation   


 


Random Vancomycin  7.51  














  01/03/18 01/03/18 01/03/18





  09:28 12:12 16:38


 


WBC   


 


RBC   


 


Hgb   


 


Hct   


 


MCV   


 


MCH   


 


MCHC   


 


RDW   


 


Plt Count   


 


MPV   


 


Neut % (Auto)   


 


Lymph % (Auto)   


 


Mono % (Auto)   


 


Eos % (Auto)   


 


Baso % (Auto)   


 


Neut #   


 


Lymph #   


 


Mono #   


 


Eos #   


 


Baso #   


 


Sodium   


 


Potassium   


 


Chloride   


 


Carbon Dioxide   


 


Anion Gap   


 


BUN   


 


Creatinine   


 


Est GFR ( Amer)   


 


Est GFR (Non-Af Amer)   


 


POC Glucose (mg/dL)   265 H  178 H


 


Random Glucose   


 


Lactic Acid   


 


Calcium   


 


Phosphorus  3.2  


 


Magnesium  2.0  


 


Total Bilirubin   


 


AST   


 


ALT   


 


Alkaline Phosphatase   


 


Total Protein   


 


Albumin   


 


Globulin   


 


Albumin/Globulin Ratio   


 


Triglycerides   


 


Cholesterol   


 


LDL Cholesterol Direct   


 


HDL Cholesterol   


 


TSH 3rd Generation   


 


Random Vancomycin   














- EKG Data


EKG Interpreted by: Myself





Assessment & Plan


(1) Atrial fibrillation


Assessment and Plan: 


I reviewed the echocardiogram.  Left ventricular function is preserved.  The 

patient has mild left mitral regurgitation.  ideally patient should be on 

antociagulation to reduce stoke risk.  Recommend increasing amiodarone to 200 

mg daily.


Status: Chronic   Priority: High   





(2) HTN (hypertension)


Assessment and Plan: 


monitor blood pressure.


Status: Chronic   Priority: High

## 2018-01-03 NOTE — CP.PCM.PN
<Geneva Eason - Last Filed: 18 15:18>





Subjective





- Date & Time of Evaluation


Date of Evaluation: 18


Time of Evaluation: 09:03





- Subjective


Subjective: 





Medicine Progress Note: Hospitalist Service





Patient seen and examined at bedside. Per nursing, patient had a temperature of 

101.0 last night. Tylenol was given. Patient is sitting up in bed eating 

breakfast. Offers no complaints at this time. Alert and Orientated x3. Denies 

headaches, dizziness, cp, palpitations, sob, abdominal pain, urinary symptoms, 

changes in bowel habits. 





Objective





- Vital Signs/Intake and Output


Vital Signs (last 24 hours): 


 











Temp Pulse Resp BP Pulse Ox


 


 98.8 F   95 H  20   130/74   95 


 


 18 08:15  18 08:15  18 08:15  18 08:15  18 08:15








Intake and Output: 


 











 18





 06:59 18:59


 


Intake Total 925 


 


Output Total 150 


 


Balance 775 














- Medications


Medications: 


 Current Medications





Acetaminophen (Tylenol 325mg Tab)  650 mg PO Q6 PRN


   PRN Reason: Fever >100.4 F


   Last Admin: 18 00:06 Dose:  650 mg


Albuterol/Ipratropium (Duoneb 3 Mg/0.5 Mg (3 Ml) Ud)  3 ml INH RQ6 PRN


   PRN Reason: Cough


Amiodarone HCl (Cordarone)  100 mg PO DAILY [facility]


Amlodipine Besylate (Norvasc)  10 mg PO DAILY [facility]


Aspirin (Aspirin Chewable)  81 mg PO DAILY [facility]


Dextrose (Dextrose 50% Inj)  0 ml IV STAT PRN; Protocol


   PRN Reason: Hypoglycemia Protocol


Dextrose (Glutose 15)  0 gm PO ONCE PRN; Protocol


   PRN Reason: Hypoglycemia Protocol


Glucagon (Glucagen Diagnostic Kit)  0 mg IM STAT PRN; Protocol


   PRN Reason: Hypoglycemia Protocol


Heparin Sodium (Porcine) (Heparin)  5,000 units SC Q12 [facility]


Dextrose (Dextrose 5% In Water 1000 Ml)  1,000 mls @ 0 mls/hr IV .Q0M PRN; 

Protocol; Per Protocol


   PRN Reason: Hypoglycemia Protocol


Vancomycin/Sodium Chloride (Vancomycin 1 Gm/Ns 200 Ml)  1 gm in 200 mls @ 166.7 

mls/hr IVPB Q24H [facility]


   Stop: 18 10:01


Sodium Chloride (Sodium Chloride 0.9%)  1,000 mls @ 75 mls/hr IV .[id number] [facility]


   Last Admin: 18 19:51 Dose:  75 mls/hr


Piperacillin Sod/Tazobactam Sod (Zosyn 2.25 Gm Iv Premix)  2.25 gm in 50 mls @ 

100 mls/hr IVPB Q8H [facility]


   Last Admin: 18 00:07 Dose:  100 mls/hr


Insulin Glargine (Lantus)  18 unit SC [facility]


   Last Admin: 18 21:50 Dose:  18 u


Insulin Human Regular (Novolin R)  0 unit SC [facility]


   PRN Reason: Protocol


   Last Admin: 18 21:40 Dose:  Not Given


Metoprolol Succinate (Toprol Xl)  50 mg PO DAILY [facility]


Omega-3-Acid Ethyl Esters (Lovaza)  1 gm PO BID [facility]


   Last Admin: 18 18:04 Dose:  1 gm


Oxybutynin Chloride (Ditropan Xl)  5 mg PO DAILY [facility]


   Last Admin: 18 18:04 Dose:  5 mg


Pantoprazole Sodium (Protonix Ec Tab)  40 mg PO DAILY [facility]


Rosuvastatin Calcium (Crestor)  10 mg PO [facility]


   Last Admin: 18 21:49 Dose:  10 mg


Saccharomyces Boulardii (Florastor)  250 mg PO BID [facility]


   Last Admin: 18 18:04 Dose:  250 mg


Sitagliptin Phosphate (Januvia)  50 mg PO DAILY [facility]











- Labs


Labs: 


 





 18 07:40 





 18 07:40 











- Constitutional


Appears: Non-toxic, No Acute Distress





- Head Exam


Head Exam: ATRAUMATIC, NORMAL INSPECTION





- Eye Exam


Eye Exam: EOMI, Normal appearance





- ENT Exam


ENT Exam: Mucous Membranes Moist





- Respiratory Exam


Respiratory Exam: Rhonchi, NORMAL BREATHING PATTERN.  absent: Rales, Wheezes, 

Respiratory Distress





- GI/Abdominal Exam


GI & Abdominal Exam: Distended, Soft, Hernia (Umbilical hernia), Normal Bowel 

Sounds.  absent: Firm, Guarding, Rigid, Tenderness





- Rectal Exam


Rectal Exam: Deferred





- Extremities Exam


Extremities Exam: absent: Calf Tenderness


Additional comments: 





+SCDs





- Neurological Exam


Neurological Exam: Alert, Awake, Oriented x3


Neuro motor strength exam: Left Upper Extremity: 0, Right Upper Extremity: 5, 

Left Lower Extremity: 2/1, Right Lower Extremity: 5


Additional comments: 





Aphasic





- Psychiatric Exam


Psychiatric exam: Normal Affect, Normal Mood





- Skin


Skin Exam: Normal Color, Warm





Assessment and Plan





- Assessment and Plan (Free Text)


Assessment: 





(1) Urosepsis, Urinary tract infection, Bacteremia


Assessment and Plan: 


* Code Sepsis 18: 11:40 AM in ED. Criteria - elevated WBC, elevated HR, 

Fever, elevated Lactate. In the ED -> Aztreonam, Vancomycin, NS bolus.


* Patient not given fluid as 30 mg/kg given concern for congestive heart 

failure.


* Infectious Disease consult, Dr Quinteros, help appreciated


* Leukocytosis 19.4 today 


* Antibiotics: Zosyn 2.25 mg IVP Q8H, Vancomycin 1g IVP Q24H, Florastor 250mg 

PO BID


* Tylenol 650mg PO Q6H PRN for fever


* NS @ 75 cc/hr


* Urine cx growing gram negative rods, f/u sensitivities


* First Blood cx growing gram positive cocci (may be contaminant), awaiting 

second blood cx results


* Procal 0.80, Lactic acid normalized


* Throat culture showing no growth, Flu negative, urine legionella negative, 


* F/u sputum culture


* Duonebs PRN shortness of breathe


* Oxygen 3 Liter for nasal cannula


* Infectious Disease on consult, Dr Quinteros, help appreciated 


* Imaging:


 * CT chest w/o contrast: No consolidative infiltrate suggested, Mild bibasilar 

peripheral subplerual septal lines noted, Bilateral renal masses, There are at 

least 2 right renal masses that appear hypodense (please see full report)


 * Bladder US: Renal cysts; right kidney 11.3x7.6x10.2cm cyst, 1.3x1.3.1.2cm 

cyst; left kidney 1.3x.10.0.9cm cyst 


 * CXR: No consolidation, cardiomegaly with limited visualization of the left 

costophrenic angle (see full report)


Status: Acute   Priority: High   





(2) History of Atrial fibrillation


Assessment and Plan: 


* Hx of Pacemaker (or AICD)


* Cardiology consult, Dr Chapman


* CHADS score 5 -> 12.5% risk of event per yr w/o AO; HASBLED 4 -> Patient at 

high risk for major bleeding


* Aspirin 81mg PO QDaily


* Metoprolol Succinate 50mg PO QDaily


* Amiodarone 100mg PO QDaily


* TSH and Free T4 within normal limits


* Patient/patient's daughter/wife cannot remember who his cardiologist is


* Cardiology on consult, Dr Chapman, mirella appreciated


Status: Chronic   Priority: High   





(3) Cardiomegaly


Assessment and Plan: 


* Echo from 2015 showed normal EF and some possible diastolic dysfunction


* Repeat Echo completed, f/u official report


* Gentle IV hydration


Status: Acute   Priority: High   





(4) History of CVA with residual deficit


Assessment and Plan: 


* Aspirin 81mg PO QD


* Crestor 10 mg PO HS (Patient home Lipitor 20mg not carried in house)


* Blood pressure control


Status: Chronic   Priority: Medium   





(5) Diabetes mellitus


Assessment and Plan: 


* Lantus 18 units HS


* ISS medium dose, Accuchecks ACHS


* Consistent carb diet


* HgbA1C 8.1


* Glimepiride 4mg PO QDaily


* Januvia 100mg PO QDaily


* HOLD metformin 1000mg PO BID 2/2 mild elevation in lactate


* Hypoglycemia protocol


Status: Chronic   Priority: Medium   





(6) HTN (hypertension)


Assessment and Plan: 


* BP well controlled


* Metoprolol succinate 50mg PO QDaily


* Norvasc 10mg PO QDaily


Status: Chronic   Priority: High   





(7) Lipid disorder


Assessment and Plan: 


* Lipid panel within normal limits


* Lovaza 1g PO BID


* Crestor 10 mg PO HS (Patient home Lipitor 20mg not carried in house)


Status: Chronic 





(8) Abnormal urinalysis


Assessment and Plan: 


* History of Urinary Incontinence


* Straight cath in ED with 200ml of normal appearing urine given he appeared he 

had difficulty urinating in urinal


* Bladder scan PRN


* Urine culture growing gram negative rods, f/u sensitivites


* Contiue Oxybutynin XL 5mg PO QDaily


* CT chest comments on renal masses, Bladder US showed renal cysts


* Urology on consult, Dr Bender, mirella appreciated 


Status: Acute   





(9) Prophylactic measure


Assessment and Plan: 


* Protonix 40mg PO QD


* SCDs


* Heparin 5000u SC Q12H


* Florastor 250mg PO BID


* PT/OT eval and treat








<Juliann Bird V - Last Filed: 18 23:15>





Objective





- Vital Signs/Intake and Output


Vital Signs (last 24 hours): 


 











Temp Pulse Resp BP Pulse Ox


 


 97.8 F   99 H  20   145/77   96 


 


 18 15:55  18 18:00  18 15:55  18 15:55  18 15:55











- Medications


Medications: 


 Current Medications





Acetaminophen (Tylenol 325mg Tab)  650 mg PO Q6 PRN


   PRN Reason: Fever >100.4 F


   Last Admin: 18 00:06 Dose:  650 mg


Albuterol/Ipratropium (Duoneb 3 Mg/0.5 Mg (3 Ml) Ud)  3 ml INH RQ6 PRN


   PRN Reason: Cough


Amiodarone HCl (Cordarone)  100 mg PO DAILY [facility]


   Last Admin: 18 11:30 Dose:  100 mg


Amlodipine Besylate (Norvasc)  10 mg PO DAILY [facility]


   Last Admin: 18 09:35 Dose:  10 mg


Aspirin (Aspirin Chewable)  81 mg PO DAILY [facility]


   Last Admin: 18 09:36 Dose:  81 mg


Dextrose (Dextrose 50% Inj)  0 ml IV STAT PRN; Protocol


   PRN Reason: Hypoglycemia Protocol


Dextrose (Glutose 15)  0 gm PO ONCE PRN; Protocol


   PRN Reason: Hypoglycemia Protocol


Glucagon (Glucagen Diagnostic Kit)  0 mg IM STAT PRN; Protocol


   PRN Reason: Hypoglycemia Protocol


Heparin Sodium (Porcine) (Heparin)  5,000 units SC Q12 [facility]


   Last Admin: 18 21:52 Dose:  5,000 units


Dextrose (Dextrose 5% In Water 1000 Ml)  1,000 mls @ 0 mls/hr IV .Q0M PRN; 

Protocol; Per Protocol


   PRN Reason: Hypoglycemia Protocol


Vancomycin/Sodium Chloride (Vancomycin 1 Gm/Ns 200 Ml)  1 gm in 200 mls @ 166.7 

mls/hr IVPB Q24H [facility]


   Stop: 18 10:01


   Last Admin: 18 12:37 Dose:  166.7 mls/hr


Sodium Chloride (Sodium Chloride 0.9%)  1,000 mls @ 75 mls/hr IV .[id number] [facility]


   Last Admin: 18 21:54 Dose:  Not Given


Piperacillin Sod/Tazobactam Sod (Zosyn 2.25 Gm Iv Premix)  2.25 gm in 50 mls @ 

100 mls/hr IVPB Q8H [facility]


   Last Admin: 18 17:18 Dose:  100 mls/hr


Vancomycin/Sodium Chloride (Vancomycin 1 Gm/Ns 200 Ml)  1 gm in 200 mls @ 133 

mls/hr IVPB ONCE ONE


   Stop: 18 01:30


Insulin Glargine (Lantus)  18 unit SC [facility]


   Last Admin: 18 21:53 Dose:  18 u


Insulin Human Regular (Novolin R)  0 unit SC [facility]


   PRN Reason: Protocol


   Last Admin: 18 21:58 Dose:  Not Given


Metoprolol Succinate (Toprol Xl)  50 mg PO DAILY [facility]


   Last Admin: 18 09:35 Dose:  50 mg


Omega-3-Acid Ethyl Esters (Lovaza)  1 gm PO BID [facility]


   Last Admin: 18 17:19 Dose:  1 gm


Oxybutynin Chloride (Ditropan Xl)  5 mg PO DAILY [facility]


   Last Admin: 18 11:30 Dose:  5 mg


Pantoprazole Sodium (Protonix Ec Tab)  40 mg PO DAILY [facility]


   Last Admin: 18 09:35 Dose:  40 mg


Rosuvastatin Calcium (Crestor)  10 mg PO [facility]


   Last Admin: 18 21:53 Dose:  10 mg


Saccharomyces Boulardii (Florastor)  250 mg PO BID [facility]


   Last Admin: 18 17:19 Dose:  250 mg


Sitagliptin Phosphate (Januvia)  50 mg PO DAILY [facility]


   Last Admin: 18 09:35 Dose:  50 mg











- Labs


Labs: 


 





 18 07:40 





 18 07:40 











Attending/Attestation





- Attestation


I have personally seen and examined this patient.: Yes


I have fully participated in the care of the patient.: Yes


I have reviewed all pertinent clinical information, including history, physical 

exam and plan: Yes


Notes (Text): 


Patient seen, examined and case discussed with day-time resident.


Patient seen this afternoon. Patient had gone for echocardiogram earlier this 

morning. Awaiting official report.


Patient reports he feels better. denies fever, denies chest pain, reports mild 

cough, denies abdominal pain, denies nausea, denies vomitting. Patient 

currently has Condom Catheter. Patient's Blood culture is positive, speciation 

not determined and Urine culture is positive, speciation not determined yet.





(1) Sepsis


Assessment and Plan: 


* Code Sepsis: 11:40 AM on 18 in ED. Criteria - elevated WBC, elevated HR, 

Fever, elevated Lactate. In the ED -> Aztreonam, Vancomycin, NS bolus.


* Patient not given fluid as 30 mg/kg given concern for congestive heart 

failure.


* Infectious Disease consult, Dr Quinteros


* Flu NEGATIVE


* F/U Blood culture which is posititve, awaiting final result


* f/u urine culture which is positive awaiting final result


* Negative for strep throat


* pending sputum culture


* F/U strep pnumoniae Ag, mycoplasma IgM, legionella Ag: negative


* Procalcitonin: 0.80


* suspecting pneumonia and urinary tract infection and possible bacteremia


 Imaging:


 * CT chest w/o contrast (18): no consolidative infiltrate suggested, mild 

bibasilar peripheral subpleural septal lines. b/l renal masses. no gross 

hydronephrosis appreciated. few small left parapelvic renal cyst possible, mild 

perirenal stranding inflammatory changes present


 * Will order for abdomen/pelvis  given possible pyleonephritis


 * F/U official report for CXR


* Duonebs PRN shortness of breathe


* Oxygen 3 Liter for nasal cannula


Meds:


Tylenol 650mg PO Q6H PRN for fever


Zosyn 2.25 mg IVP Q8H


Vancomycin 1g IVP Q24H





Status: Acute   Priority: High   





(2) History of Atrial fibrillation


Assessment and Plan: 


* Hx of Pacemaker (or AICD)


* Cardiology consult, Dr Chapman


* CHADS 5 -> 12.5% risk of event per yr w/o AO; HASBLED 4 -> Patient at high 

risk for major bleeding


* Aspirin 81mg PO QDaily


* Metoprolol Succinate 50mg PO QDaily


* Increased Amiodarone 200mg PO QDaily


* TSH/Free T4


* Patient/patient's daughter/wife cannot remember who his cardiologist is


Status: Chronic   Priority: High   





(3) Cardiomegaly


Assessment and Plan: 


* Echo from  showed normal EF and some possible diastolic dysfunction


* F/U Echo--Pending official read; cardiology on call noted normal V function, 

and mild MR


* Gentle IV hydration


* CT chest w/o contrast (18): no consolidative infiltrate suggested, mild 

bibasilar peripheral subpleural septal lines. b/l renal masses. no gross 

hydronephrosis appreciated. few small left parapelvic renal cyst possible, mild 

perirenal stranding inflammatory changes present


Status: Acute   Priority: High   





(4) History of CVA with residual deficit


Assessment and Plan: 


* Aspirin 81mg PO QD


* Crestor 10 mg PO HS (Patient home Lipitor 20mg not carried in house)


* Blood pressure control


Status: Chronic   Priority: Medium   





(5) Diabetes mellitus


Assessment and Plan: 


* Accuchecks ACHS


* RISS medium dose


* Consistent carb diet


* HgbA1C: 8.1


* Glimepiride 4mg PO QDaily


* Januvia 100mg PO QDaily


* HOLD metformin 1000mg PO BID 2/2 mild elevation in lactate


* Lipid panel: T, Chol: 83, LDL<30, HDL: 27


Status: Chronic   Priority: Medium   





(6) HTN (hypertension)


Assessment and Plan: 


* BP well controlled


* Metoprolol succinate 50mg PO QDaily


* Norvasc 10mg PO QDaily


* HOLD home benazepril 40mg 2/2 to elevated potassium


Status: Chronic   Priority: High   





(7) Lipid disorder


Assessment and Plan: 


* Lipid panel: T, Chol: 83, LDL<30, HDL: 27


* Lovaza 1g PO BID


* Crestor 10 mg PO HS (Patient home Lipitor 20mg not carried in house)


Status: Chronic 





(8) Abnormal urinalysis


       Positive Urine Culture


Assessment and Plan: 


* History of Urinary Incontinence


* Straight cath in ED with 200ml of normal appearing urine given he appeared he 

had difficulty urinating in urinal


* F/U Urine culture pending


* c/w home medication:Oxybutynin XL 5mg PO QDaily


* Note in patient's CT chest comments on renal masses?


* Renal US: renal cysts noted


Status: Acute   





(9) Prophylactic measure


Assessment and Plan: 


* Protonix 40mg PO QD


* SCDs


* Heparin 5000u SC Q8H


* Florastor 250mg PO BID


* PT/OT eval and treat: recommend for KITTY





Disposition: Awaiting final Blood and urine cultures. Order for CT Abdomen/

Pelvis w/o contrast.

## 2018-01-03 NOTE — CP.PCM.CON
History of Present Illness





- History of Present Illness


History of Present Illness: 





dictated





Past Patient History





- Past Medical History & Family History


Past Medical History?: Yes





- Past Social History


Smoking Status: Former Smoker





- CARDIAC


Hx Cardiac Disorders: Yes (A.Fib)


Hx Hypercholesterolemia: Yes


Hx Hypertension: Yes





- PULMONARY


Hx Respiratory Disorders: No





- NEUROLOGICAL


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





- HEENT


Hx HEENT Problems: No





- RENAL


Hx Chronic Kidney Disease: No





- ENDOCRINE/METABOLIC


Hx Diabetes Mellitus Type 2: Yes





- HEMATOLOGICAL/ONCOLOGICAL


Hx Blood Disorders: No





- INTEGUMENTARY


Hx Dermatological Problems: No





- MUSCULOSKELETAL/RHEUMATOLOGICAL


Hx Falls: Yes





- GASTROINTESTINAL


Hx Gastrointestinal Disorders: No





- GENITOURINARY/GYNECOLOGICAL


Hx Genitourinary Disorders: No





- PSYCHIATRIC


Hx Substance Use: No





- SURGICAL HISTORY


Hx Surgeries: Yes


Hx Cholecystectomy: Yes





- ANESTHESIA


Hx Anesthesia: Yes


Hx Anesthesia Reactions: No


Hx Malignant Hyperthermia: No





Meds


Allergies/Adverse Reactions: 


 Allergies











Allergy/AdvReac Type Severity Reaction Status Date / Time


 


No Known Allergies Allergy   Verified 01/02/18 10:33














- Medications


Medications: 


 Current Medications





Acetaminophen (Tylenol 325mg Tab)  650 mg PO Q6 PRN


   PRN Reason: Fever >100.4 F


   Last Admin: 01/03/18 00:06 Dose:  650 mg


Albuterol/Ipratropium (Duoneb 3 Mg/0.5 Mg (3 Ml) Ud)  3 ml INH RQ6 PRN


   PRN Reason: Cough


Amiodarone HCl (Cordarone)  100 mg PO DAILY [facility]


   Last Admin: 01/03/18 11:30 Dose:  100 mg


Amlodipine Besylate (Norvasc)  10 mg PO DAILY [facility]


   Last Admin: 01/03/18 09:35 Dose:  10 mg


Aspirin (Aspirin Chewable)  81 mg PO DAILY [facility]


   Last Admin: 01/03/18 09:36 Dose:  81 mg


Dextrose (Dextrose 50% Inj)  0 ml IV STAT PRN; Protocol


   PRN Reason: Hypoglycemia Protocol


Dextrose (Glutose 15)  0 gm PO ONCE PRN; Protocol


   PRN Reason: Hypoglycemia Protocol


Glucagon (Glucagen Diagnostic Kit)  0 mg IM STAT PRN; Protocol


   PRN Reason: Hypoglycemia Protocol


Heparin Sodium (Porcine) (Heparin)  5,000 units SC Q12 [facility]


   Last Admin: 01/03/18 09:39 Dose:  5,000 units


Dextrose (Dextrose 5% In Water 1000 Ml)  1,000 mls @ 0 mls/hr IV .Q0M PRN; 

Protocol; Per Protocol


   PRN Reason: Hypoglycemia Protocol


Vancomycin/Sodium Chloride (Vancomycin 1 Gm/Ns 200 Ml)  1 gm in 200 mls @ 166.7 

mls/hr IVPB Q24H [facility]


   Stop: 01/08/18 10:01


   Last Admin: 01/03/18 12:37 Dose:  166.7 mls/hr


Sodium Chloride (Sodium Chloride 0.9%)  1,000 mls @ 75 mls/hr IV .[id number] [facility]


   Last Admin: 01/03/18 09:00 Dose:  75 mls/hr


Piperacillin Sod/Tazobactam Sod (Zosyn 2.25 Gm Iv Premix)  2.25 gm in 50 mls @ 

100 mls/hr IVPB Q8H [facility]


   Last Admin: 01/03/18 17:18 Dose:  100 mls/hr


Insulin Glargine (Lantus)  18 unit SC [facility]


   Last Admin: 01/02/18 21:50 Dose:  18 u


Insulin Human Regular (Novolin R)  0 unit SC [facility]


   PRN Reason: Protocol


   Last Admin: 01/03/18 17:19 Dose:  2 unit


Metoprolol Succinate (Toprol Xl)  50 mg PO DAILY [facility]


   Last Admin: 01/03/18 09:35 Dose:  50 mg


Omega-3-Acid Ethyl Esters (Lovaza)  1 gm PO BID [facility]


   Last Admin: 01/03/18 17:19 Dose:  1 gm


Oxybutynin Chloride (Ditropan Xl)  5 mg PO DAILY [facility]


   Last Admin: 01/03/18 11:30 Dose:  5 mg


Pantoprazole Sodium (Protonix Ec Tab)  40 mg PO DAILY [facility]


   Last Admin: 01/03/18 09:35 Dose:  40 mg


Rosuvastatin Calcium (Crestor)  10 mg PO HS [facility]


   Last Admin: 01/02/18 21:49 Dose:  10 mg


Saccharomyces Boulardii (Florastor)  250 mg PO BID [facility]


   Last Admin: 01/03/18 17:19 Dose:  250 mg


Sitagliptin Phosphate (Januvia)  50 mg PO DAILY [facility]


   Last Admin: 01/03/18 09:35 Dose:  50 mg











Results





- Vital Signs


Recent Vital Signs: 


 Last Vital Signs











Temp  97.8 F   01/03/18 15:55


 


Pulse  99 H  01/03/18 18:00


 


Resp  20   01/03/18 15:55


 


BP  145/77   01/03/18 15:55


 


Pulse Ox  96   01/03/18 15:55














- Labs


Result Diagrams: 


 01/03/18 07:40





 01/03/18 07:40


Labs: 


 Laboratory Results - last 24 hr











  01/02/18 01/03/18 01/03/18





  21:47 06:21 07:40


 


WBC   


 


RBC   


 


Hgb   


 


Hct   


 


MCV   


 


MCH   


 


MCHC   


 


RDW   


 


Plt Count   


 


MPV   


 


Neut % (Auto)   


 


Lymph % (Auto)   


 


Mono % (Auto)   


 


Eos % (Auto)   


 


Baso % (Auto)   


 


Neut #   


 


Lymph #   


 


Mono #   


 


Eos #   


 


Baso #   


 


Sodium    138


 


Potassium    3.4 L


 


Chloride    107


 


Carbon Dioxide    26


 


Anion Gap    9 L


 


BUN    18


 


Creatinine    1.3


 


Est GFR ( Amer)    > 60


 


Est GFR (Non-Af Amer)    54


 


POC Glucose (mg/dL)  255 H  170 H 


 


Random Glucose    174 H


 


Lactic Acid   


 


Calcium    8.1 L


 


Phosphorus   


 


Magnesium   


 


Total Bilirubin    1.2


 


AST    28


 


ALT    23


 


Alkaline Phosphatase    65


 


Total Protein    6.6


 


Albumin    3.3 L D


 


Globulin    3.3


 


Albumin/Globulin Ratio    1.0


 


Triglycerides    83


 


Cholesterol    83


 


LDL Cholesterol Direct    < 30


 


HDL Cholesterol    27 L


 


TSH 3rd Generation    1.45


 


Random Vancomycin   














  01/03/18 01/03/18 01/03/18





  07:40 07:40 07:40


 


WBC   19.4 H 


 


RBC   5.00 


 


Hgb   13.2  D 


 


Hct   40.5 


 


MCV   81.1 


 


MCH   26.4 L 


 


MCHC   32.6 L 


 


RDW   14.4 


 


Plt Count   164 


 


MPV   9.2 


 


Neut % (Auto)   80.3 H 


 


Lymph % (Auto)   10.1 L 


 


Mono % (Auto)   9.1 


 


Eos % (Auto)   0.3 


 


Baso % (Auto)   0.2 


 


Neut #   15.5 H 


 


Lymph #   2.0 


 


Mono #   1.8 H 


 


Eos #   0.1 


 


Baso #   0.0 


 


Sodium   


 


Potassium   


 


Chloride   


 


Carbon Dioxide   


 


Anion Gap   


 


BUN   


 


Creatinine   


 


Est GFR ( Amer)   


 


Est GFR (Non-Af Amer)   


 


POC Glucose (mg/dL)   


 


Random Glucose   


 


Lactic Acid    1.4


 


Calcium   


 


Phosphorus   


 


Magnesium   


 


Total Bilirubin   


 


AST   


 


ALT   


 


Alkaline Phosphatase   


 


Total Protein   


 


Albumin   


 


Globulin   


 


Albumin/Globulin Ratio   


 


Triglycerides   


 


Cholesterol   


 


LDL Cholesterol Direct   


 


HDL Cholesterol   


 


TSH 3rd Generation   


 


Random Vancomycin  7.51  














  01/03/18 01/03/18 01/03/18





  09:28 12:12 16:38


 


WBC   


 


RBC   


 


Hgb   


 


Hct   


 


MCV   


 


MCH   


 


MCHC   


 


RDW   


 


Plt Count   


 


MPV   


 


Neut % (Auto)   


 


Lymph % (Auto)   


 


Mono % (Auto)   


 


Eos % (Auto)   


 


Baso % (Auto)   


 


Neut #   


 


Lymph #   


 


Mono #   


 


Eos #   


 


Baso #   


 


Sodium   


 


Potassium   


 


Chloride   


 


Carbon Dioxide   


 


Anion Gap   


 


BUN   


 


Creatinine   


 


Est GFR ( Amer)   


 


Est GFR (Non-Af Amer)   


 


POC Glucose (mg/dL)   265 H  178 H


 


Random Glucose   


 


Lactic Acid   


 


Calcium   


 


Phosphorus  3.2  


 


Magnesium  2.0  


 


Total Bilirubin   


 


AST   


 


ALT   


 


Alkaline Phosphatase   


 


Total Protein   


 


Albumin   


 


Globulin   


 


Albumin/Globulin Ratio   


 


Triglycerides   


 


Cholesterol   


 


LDL Cholesterol Direct   


 


HDL Cholesterol   


 


TSH 3rd Generation   


 


Random Vancomycin

## 2018-01-04 LAB
ALBUMIN SERPL-MCNC: 3.5 G/DL (ref 3.5–5)
ALBUMIN SERPL-MCNC: 3.6 G/DL (ref 3.5–5)
ALBUMIN/GLOB SERPL: 1 {RATIO} (ref 1–2.1)
ALBUMIN/GLOB SERPL: 1.1 {RATIO} (ref 1–2.1)
ALT SERPL-CCNC: 33 U/L (ref 21–72)
ALT SERPL-CCNC: 41 U/L (ref 21–72)
AST SERPL-CCNC: 32 U/L (ref 17–59)
AST SERPL-CCNC: 32 U/L (ref 17–59)
BASOPHILS # BLD AUTO: 0 K/UL (ref 0–0.2)
BASOPHILS # BLD AUTO: 0 K/UL (ref 0–0.2)
BASOPHILS NFR BLD: 0.2 % (ref 0–2)
BASOPHILS NFR BLD: 0.2 % (ref 0–2)
BUN SERPL-MCNC: 15 MG/DL (ref 9–20)
BUN SERPL-MCNC: 16 MG/DL (ref 9–20)
BURR CELLS BLD QL SMEAR: SLIGHT
CALCIUM SERPL-MCNC: 8 MG/DL (ref 8.6–10.4)
CALCIUM SERPL-MCNC: 8.3 MG/DL (ref 8.6–10.4)
EOSINOPHIL # BLD AUTO: 0 K/UL (ref 0–0.7)
EOSINOPHIL # BLD AUTO: 0.1 K/UL (ref 0–0.7)
EOSINOPHIL NFR BLD: 0 % (ref 0–4)
EOSINOPHIL NFR BLD: 0.6 % (ref 0–4)
EOSINOPHIL NFR BLD: 1 % (ref 0–4)
EOSINOPHIL NFR BLD: 1 % (ref 0–4)
ERYTHROCYTE [DISTWIDTH] IN BLOOD BY AUTOMATED COUNT: 14.4 % (ref 11.5–14.5)
ERYTHROCYTE [DISTWIDTH] IN BLOOD BY AUTOMATED COUNT: 14.5 % (ref 11.5–14.5)
GFR NON-AFRICAN AMERICAN: > 60
GFR NON-AFRICAN AMERICAN: > 60
HGB BLD-MCNC: 13.1 G/DL (ref 12–18)
HGB BLD-MCNC: 13.2 G/DL (ref 12–18)
INR PPP: 1.2
LYMPHOCYTE: 10 % (ref 20–40)
LYMPHOCYTE: 9 % (ref 20–40)
LYMPHOCYTES # BLD AUTO: 1 K/UL (ref 1–4.3)
LYMPHOCYTES # BLD AUTO: 1 K/UL (ref 1–4.3)
LYMPHOCYTES NFR BLD AUTO: 8 % (ref 20–40)
LYMPHOCYTES NFR BLD AUTO: 8.6 % (ref 20–40)
MAGNESIUM SERPL-MCNC: 1.7 MG/DL (ref 1.6–2.3)
MCH RBC QN AUTO: 26.5 PG (ref 27–31)
MCH RBC QN AUTO: 26.5 PG (ref 27–31)
MCHC RBC AUTO-ENTMCNC: 33.1 G/DL (ref 33–37)
MCHC RBC AUTO-ENTMCNC: 33.1 G/DL (ref 33–37)
MCV RBC AUTO: 80.1 FL (ref 80–94)
MCV RBC AUTO: 80.2 FL (ref 80–94)
MONOCYTE: 6 % (ref 0–10)
MONOCYTE: 8 % (ref 0–10)
MONOCYTES # BLD: 0.9 K/UL (ref 0–0.8)
MONOCYTES # BLD: 1 K/UL (ref 0–0.8)
MONOCYTES NFR BLD: 8.3 % (ref 0–10)
MONOCYTES NFR BLD: 8.5 % (ref 0–10)
NEUTROPHILS # BLD: 9.1 K/UL (ref 1.8–7)
NEUTROPHILS # BLD: 9.9 K/UL (ref 1.8–7)
NEUTROPHILS NFR BLD AUTO: 80 % (ref 50–75)
NEUTROPHILS NFR BLD AUTO: 82.7 % (ref 50–75)
NEUTROPHILS NFR BLD AUTO: 82.9 % (ref 50–75)
NEUTROPHILS NFR BLD AUTO: 84 % (ref 50–75)
NEUTS BAND NFR BLD: 1 % (ref 0–2)
NRBC BLD AUTO-RTO: 0 % (ref 0–2)
NRBC BLD AUTO-RTO: 0 % (ref 0–2)
OVALOCYTES BLD QL SMEAR: SLIGHT
PLATELET # BLD EST: NORMAL 10*3/UL
PLATELET # BLD EST: NORMAL 10*3/UL
PLATELET # BLD: 165 K/UL (ref 130–400)
PLATELET # BLD: 173 K/UL (ref 130–400)
PMV BLD AUTO: 9.2 FL (ref 7.2–11.7)
PMV BLD AUTO: 9.6 FL (ref 7.2–11.7)
PROTHROMBIN TIME: 13.4 SECONDS (ref 9.7–12.2)
RBC # BLD AUTO: 4.95 MIL/UL (ref 4.4–5.9)
RBC # BLD AUTO: 4.97 MIL/UL (ref 4.4–5.9)
TOTAL CELLS COUNTED BLD: 100
TOTAL CELLS COUNTED BLD: 100
WBC # BLD AUTO: 11 K/UL (ref 4.8–10.8)
WBC # BLD AUTO: 11.9 K/UL (ref 4.8–10.8)

## 2018-01-04 RX ADMIN — TAZOBACTAM SODIUM AND PIPERACILLIN SODIUM SCH MLS/HR: 250; 2 INJECTION, SOLUTION INTRAVENOUS at 00:17

## 2018-01-04 RX ADMIN — TAZOBACTAM SODIUM AND PIPERACILLIN SODIUM SCH MLS/HR: 250; 2 INJECTION, SOLUTION INTRAVENOUS at 17:27

## 2018-01-04 RX ADMIN — METOPROLOL SUCCINATE SCH: 50 TABLET, EXTENDED RELEASE ORAL at 12:25

## 2018-01-04 RX ADMIN — HUMAN INSULIN SCH UNIT: 100 INJECTION, SOLUTION SUBCUTANEOUS at 08:47

## 2018-01-04 RX ADMIN — HUMAN INSULIN SCH: 100 INJECTION, SOLUTION SUBCUTANEOUS at 21:33

## 2018-01-04 RX ADMIN — Medication SCH: at 12:26

## 2018-01-04 RX ADMIN — HUMAN INSULIN SCH: 100 INJECTION, SOLUTION SUBCUTANEOUS at 17:16

## 2018-01-04 RX ADMIN — OMEGA-3-ACID ETHYL ESTERS SCH: 900 CAPSULE, LIQUID FILLED ORAL at 12:26

## 2018-01-04 RX ADMIN — HUMAN INSULIN SCH: 100 INJECTION, SOLUTION SUBCUTANEOUS at 13:09

## 2018-01-04 RX ADMIN — TAZOBACTAM SODIUM AND PIPERACILLIN SODIUM SCH MLS/HR: 250; 2 INJECTION, SOLUTION INTRAVENOUS at 08:49

## 2018-01-04 RX ADMIN — INSULIN GLARGINE SCH: 100 INJECTION, SOLUTION SUBCUTANEOUS at 21:33

## 2018-01-04 RX ADMIN — VANCOMYCIN HYDROCHLORIDE SCH MLS/HR: 1 INJECTION, POWDER, LYOPHILIZED, FOR SOLUTION INTRAVENOUS at 12:11

## 2018-01-04 NOTE — CT
PROCEDURE:  CT Abdomen and Pelvis without Oral or IV contrast.



HISTORY:

renal mass, possible urine culture, suspect pyleo



COMPARISON:

Renal ultrasound performed 1/2/18



TECHNIQUE:

Contiguous axial images of the abdomen and pelvis. No oral or IV 

contrast administered. Coronal and Sagittal reformats generated and 

reviewed. 



Radiation dose:



Total exam DLP = 917.36 mGy-cm.



This CT exam was performed using one or more of the following dose 

reduction techniques: Automated exposure control, adjustment of the 

mA and/or kV according to patient size, and/or use of iterative 

reconstruction technique.



FINDINGS:

There is limited evaluation of the solid organs without the 

administration of IV contrast.



LOWER THORAX:

Bibasilar atelectasis. No visible pleural effusion or pneumothorax.  

Cardiomegaly. Partially imaged AICD lead. Trace pericardial effusion. 



LIVER:

Unremarkable unenhanced appearance.



GALLBLADDER AND BILE DUCTS:

Cholecystectomy. 



PANCREAS:

Unremarkable unenhanced appearance.



SPLEEN:

7 mm probable splenule.  Otherwise unremarkable unenhanced 

appearance. 



ADRENALS:

Bilateral adrenal gland hypertrophy. 



KIDNEYS AND URETERS:

No hydronephrosis or obstructing renal calculus. 11.1 x 10.2 cm 

complex right cystic lesion with evidence of calcification and 

septation. 7 mm right upper pole echogenic lesion, indeterminate. 17 

mm low-density lesion, left upper pole kidney.  Nonobstructing left 

renal calculus. 4 mm echogenic focus, left lower pole echogenic focus.



BLADDER:

The urinary bladder appears unremarkable.



REPRODUCTIVE:

The prostate gland measures approximately 4.3 x 5.4 cm.



APPENDIX:

The appendix appears within normal limits of caliber. No secondary 

signs of acute appendicitis.



BOWEL:

Lack of oral contrast limits evaluation for bowel pathology.   The 

bowel loops appear within normal limits of caliber without evidence 

of intestinal obstruction.



PERITONEUM:

No significant free fluid. No definite free air.



LYMPH NODES:

No bulky lymphadenopathy identified.



VASCULATURE:

No aortic aneurysm. 



BONES:

Mild degenerative changes.



OTHER FINDINGS:

None. 



IMPRESSION:

11.1 x 10.2 cm complex right cystic lesion with evidence of 

calcification and septation. 7 mm right upper pole echogenic lesion, 

indeterminate. 17 mm low-density lesion, left upper pole kidney. 

Further evaluation may be considered with MRI or renal mass CT if 

indicated. Pyelonephritis cannot be excluded on the basis of 

noncontrast CT. 



Nonobstructing left renal calculus. 4 mm echogenic focus, left lower 

pole echogenic focus. No hydronephrosis. 



Additional findings as above.

## 2018-01-04 NOTE — CON
DATE:



INFECTIOUS DISEASE CONSULTATION



REQUESTED BY:  _____



HISTORY OF PRESENT ILLNESS:  This patient is a 77-year-old male, he

presented with weakness, lethargy, generalized weakness, and having fevers

of 101.1 yesterday in the emergency room and he was placed on sepsis

protocol as his lactic level was 2.2 and he was tachycardic.  He remained

with an irregularly irregular heart rate with atrial fibrillation and

tachycardia.  The patient also was febrile and septic with sepsis syndrome

and was admitted.  This patient presented with weakness and fever.  The

daughter provided the history as the patient is not able to give much

history.  He has a history of atrial fibrillation and CVA, has left-sided

weakness, diabetes mellitus, hypertension, hyperlipidemia, and when the

daughter took the patient to shower, the patient began to have chills.  He

generally ambulates with a cane. but was so weak and he was brought in to

the bed.  The patient's wife has had cold recently, so probably, he may

have a cold.  He also was having urinary incontinence and he has Sky at

this time and now the urine shows UTI and one of his blood cultures is

positive.  He denies any pain anywhere right now.  Denies any chest or

abdominal pain.  He has no nausea, no vomiting.  He does have left-sided

weakness, and he is being fed by the nurse's assistant at this time.  He

denies any cough right now, but he appears short of breath and he is on

oxygen.



PAST MEDICAL HISTORY:  Significant for atrial fibrillation, CVA in 1995

with residual left-sided weakness, diabetes mellitus, hypertension, and

hyperlipidemia.  He also has a surgical history of pacemaker, which was

placed in 2012, which is on the left side of his chest wall,

cholecystectomy in 2002.



ALLERGIES:  HE IS NOT ALLERGIC TO ANY MEDICINE.



FAMILY HISTORY:  Noncontributory.



SOCIAL HISTORY:  Significant for smoking, 20-pack years.  Currently, he is

a nonsmoker.  EtOH use in the past, currently nondrinker.  No illicit

substances.  He lives with his wife and a caretaker.



HOME MEDICATIONS:  He was on potassium, amlodipine, amiodarone, benazepril,

simvastatin, Lovaza, Lantus, metoprolol, metformin, atorvastatin, Januvia,

oxybutynin, and glimepiride.  I do not see an anticoagulant.



REVIEW OF SYSTEMS:  Came in with chills, fever, and weakness.  He has no

change in vision.  He does have a history of dry mouth.  No chest pain

reported.  Denied any cough or wheezing.  No abdominal pain.  No diarrhea. 

No nausea.  No vomiting.  He is incontinent of urine and has placement of

Sky catheter.  He is a former smoker.  Has a history of arrhythmias,

hypercholesterolemia, hypertension, and pacemaker.  He has CVA, chronic

kidney disease, diabetes mellitus type 2, and he has had anesthesia in the

past.



MEDICATIONS:  He is on Zosyn and vancomycin.  He is also on Januvia,

Florastor, Crestor, Protonix, Ditropan, Lovaza, Toprol XL, Novolin regular

coverage, and on Lantus 18 units at bedtime.  He is on aspirin 81 mg,

amlodipine, and Cordarone.



PHYSICAL EXAMINATION

GENERAL:  He appears sick-looking.

VITAL SIGNS:  T-max is 97.8 today, actually he had 101.0 last night, and

now temperature is 97.8, pulse of 97, blood pressure 145/77, respirations

20, saturations 96%.

HEENT:  Head is atraumatic.  Pupils are reacting to light.  He does have

facial palsy.  Tongue is moist.

NECK:  Supple.  JVP is flat.

LUNGS:  Decreased breath sounds bilaterally.  Occasional rhonchi

bilaterally.

HEART:  S1 and S2 is regular at this time.  Mild tachycardia.

ABDOMEN:  Remains prominent.  No guarding.  No rigidity present.

NEUROLOGIC:  Left-sided weakness is noted because he has left hemiparesis.

EXTREMITIES:  Right leg has no edema.  He has some weakness in the left arm

as well as left leg.  Left arm, he cannot lift, so is 0/5 strength and 3/5.

He is not able to talk, also has aphasia.



LABORATORY DATA:  Labs are noted.  White count is 19.4, hemoglobin 13.2,

hematocrit 40.5, and platelet count is 164.  Sodium is 138, potassium is

3.4, chloride is 107, CO2 is 26, anion gap is 9, BUN is 18, creatinine 1.3,

albumin is 3.3.  Vancomycin random level was 7.51.  His flu swab is

negative.  Urine Legionella is negative.  Random level came 7.57.  UA shows

wbc's 6, rbc's 21, blood 2+, glucose 1+.  Micro revealed urine culture has

Gram-negative rods.  Throat swab was negative.  Blood culture, one set has

GPC, the other set is negative.  We are going to repeat the blood cultures

today, 2 sets and then give the vancomycin, but he did receive vancomycin 1

dose yesterday, maybe contaminant, but he does have a pacemaker.  Lactic

acid is 1.4.  He had a bladder ultrasound done and the bladder ultrasound

shows that right kidney is normal, left kidney is normal.  Bladder is

unremarkable, pre-void 111, renal cysts are present, so he has renal cysts.

Then, he had a chest CT done last night and the chest CT shows no

consolidative infiltrate suggesting mild bibasilar peripheral subpleural

septal lines noted and an element of interstitial lung disease _____

bilateral renal masses, and they are at least right renal, so these are

renal cysts.  No hydronephrosis.  He has no acute infiltrate.  Status post

cholecystectomy.



ASSESSMENT AND PLAN:  So at this time, he is probably admitted with

bronchitis and urinary tract infection.  He has chronic atrial

fibrillation.  He has Gram-positive cocci in the blood.  We will repeat it,

and if repeat shows same organism, then we will have to worry about

pacemaker infection and other etiologies.







__________________________________________

Petey Quinteros MD



DD:  01/03/2018 20:34:05

DT:  01/04/2018 0:30:05

Job # [id number]

## 2018-01-04 NOTE — CP.PCM.CON
History of Present Illness





- History of Present Illness


History of Present Illness: 





Asked by hospitalist team for a GI consultation on this patient.  77 year old 

male with history of CVA, Atrial fibrillation s/p PPM, DM, HTN, hyperlipidemia 

who initially presented to hospital with complaint of weakness and fever 2 days 

ago.  He remains in hospital and is currently being treated for UTI and 

bacteremia.  GI called for evaluation of coffee ground emesis this morning.  

Patient is lethargic and with dysarthria, unable to fully participate in 

meaningful conversation.  Additional history obtained via chart review, 

discussion with nursing staff, and discussion with patient's family member (

daughter).  He was noted to have progressive gastric distention over past two 

days and developed sudden onset vomiting this morning.  Prior to this he was in 

usual state of health and was tolerating PO diet.  NGT was inserted with nearly 

500 cc of brown/coffee ground output.  Unclear regarding prior endoscopic 

evaluation.  Review of vitals from today shows tachycardia, elevated BP.





Social history: former smoker, no ETOH use


Family history: no family history of colon cancer





Review of Systems





- Review of Systems


Systems not reviewed;Unavailable: Altered Mental Status





Past Patient History





- Past Medical History & Family History


Past Medical History?: Yes





- Past Social History


Smoking Status: Former Smoker





- CARDIAC


Hx Cardiac Disorders: Yes (A.Fib)


Hx Hypercholesterolemia: Yes


Hx Hypertension: Yes





- PULMONARY


Hx Respiratory Disorders: No





- NEUROLOGICAL


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





- HEENT


Hx HEENT Problems: No





- RENAL


Hx Chronic Kidney Disease: No





- ENDOCRINE/METABOLIC


Hx Diabetes Mellitus Type 2: Yes





- HEMATOLOGICAL/ONCOLOGICAL


Hx Blood Disorders: No





- INTEGUMENTARY


Hx Dermatological Problems: No





- MUSCULOSKELETAL/RHEUMATOLOGICAL


Hx Falls: Yes





- GASTROINTESTINAL


Hx Gastrointestinal Disorders: No





- GENITOURINARY/GYNECOLOGICAL


Hx Genitourinary Disorders: No





- PSYCHIATRIC


Hx Substance Use: No





- SURGICAL HISTORY


Hx Surgeries: Yes


Hx Cholecystectomy: Yes





- ANESTHESIA


Hx Anesthesia: Yes


Hx Anesthesia Reactions: No


Hx Malignant Hyperthermia: No





Meds


Allergies/Adverse Reactions: 


 Allergies











Allergy/AdvReac Type Severity Reaction Status Date / Time


 


No Known Allergies Allergy   Verified 01/02/18 10:33














- Medications


Medications: 


 Current Medications





Acetaminophen (Tylenol 325mg Tab)  650 mg PO Q6 PRN


   PRN Reason: Fever >100.4 F


   Last Admin: 01/04/18 00:17 Dose:  650 mg


Albuterol/Ipratropium (Duoneb 3 Mg/0.5 Mg (3 Ml) Ud)  3 ml INH RQ6 PRN


   PRN Reason: Cough


Amiodarone HCl (Cordarone)  200 mg PO DAILY [facility]


   Last Admin: 01/04/18 12:27 Dose:  Not Given


Amlodipine Besylate (Norvasc)  10 mg PO DAILY [facility]


   Last Admin: 01/04/18 12:26 Dose:  Not Given


Aspirin (Aspirin Chewable)  81 mg PO DAILY [facility]


   Last Admin: 01/04/18 12:27 Dose:  Not Given


Dextrose (Dextrose 50% Inj)  0 ml IV STAT PRN; Protocol


   PRN Reason: Hypoglycemia Protocol


Dextrose (Glutose 15)  0 gm PO ONCE PRN; Protocol


   PRN Reason: Hypoglycemia Protocol


Glucagon (Glucagen Diagnostic Kit)  0 mg IM STAT PRN; Protocol


   PRN Reason: Hypoglycemia Protocol


Heparin Sodium (Porcine) (Heparin)  5,000 units SC Q12 [facility]


   Last Admin: 01/04/18 13:29 Dose:  Not Given


Dextrose (Dextrose 5% In Water 1000 Ml)  1,000 mls @ 0 mls/hr IV .Q0M PRN; 

Protocol; Per Protocol


   PRN Reason: Hypoglycemia Protocol


Vancomycin/Sodium Chloride (Vancomycin 1 Gm/Ns 200 Ml)  1 gm in 200 mls @ 166.7 

mls/hr IVPB Q24H [facility]


   Stop: 01/08/18 10:01


   Last Admin: 01/04/18 12:11 Dose:  166.7 mls/hr


Sodium Chloride (Sodium Chloride 0.9%)  1,000 mls @ 75 mls/hr IV .[id number] [facility]


   Last Admin: 01/04/18 12:25 Dose:  Not Given


Piperacillin Sod/Tazobactam Sod (Zosyn 2.25 Gm Iv Premix)  2.25 gm in 50 mls @ 

100 mls/hr IVPB Q8H [facility]


   Last Admin: 01/04/18 08:49 Dose:  100 mls/hr


Insulin Glargine (Lantus)  18 unit SC HS [facility]


   Last Admin: 01/03/18 21:53 Dose:  18 u


Insulin Human Regular (Novolin R)  0 unit SC ACHS JACKI


   PRN Reason: Protocol


   Last Admin: 01/04/18 13:09 Dose:  Not Given


Metoprolol Succinate (Toprol Xl)  50 mg PO DAILY [facility]


   Last Admin: 01/04/18 12:25 Dose:  Not Given


Metoprolol Tartrate (Lopressor)  5 mg IVP Q6H PRN


   PRN Reason: Systolic Blood Pressure


Omega-3-Acid Ethyl Esters (Lovaza)  1 gm PO BID [facility]


   Last Admin: 01/04/18 12:26 Dose:  Not Given


Ondansetron HCl (Zofran Inj)  4 mg IVP Q6H PRN


   PRN Reason: Nausea/Vomiting


   Last Admin: 01/04/18 03:42 Dose:  4 mg


Oxybutynin Chloride (Ditropan Xl)  5 mg PO DAILY [facility]


   Last Admin: 01/04/18 12:26 Dose:  Not Given


Pantoprazole Sodium (Protonix Inj)  40 mg IVP Q12H [facility]


   Last Admin: 01/04/18 13:30 Dose:  40 mg


Rivaroxaban (Xarelto)  20 mg PO DAILY [facility]


Rosuvastatin Calcium (Crestor)  10 mg PO HS [facility]


   Last Admin: 01/03/18 21:53 Dose:  10 mg


Saccharomyces Boulardii (Florastor)  250 mg PO BID [facility]


   Last Admin: 01/04/18 12:26 Dose:  Not Given


Sitagliptin Phosphate (Januvia)  50 mg PO DAILY [facility]


   Last Admin: 01/04/18 12:26 Dose:  Not Given











Physical Exam





- Constitutional


Appears: Non-toxic, Confused





- Head Exam


Head Exam: NORMAL INSPECTION





- Eye Exam


Eye Exam: EOMI, Normal appearance





- ENT Exam


ENT Exam: Mucous Membranes Dry


Additional comments: 





NGT present





- Respiratory Exam


Respiratory Exam: Clear to Auscultation Bilateral





- Cardiovascular Exam


Cardiovascular Exam: REGULAR RHYTHM, +S1, +S2


Additional comments: 





PPM present on L anterior chest wall





- GI/Abdominal Exam


GI & Abdominal Exam: Diminished Bowel Sounds, Soft


Additional comments: 





non tender to palpation in four quadrants


no palpable hepato/splenomegaly





- Extremities Exam


Extremities exam: Positive for: normal inspection





- Neurological Exam


Additional comments: 





patient unable to participate with neurological examination





- Psychiatric Exam


Psychiatric exam: Normal Affect, Normal Mood





- Skin


Skin Exam: Dry, Intact, Normal Color, Warm





Results





- Vital Signs


Recent Vital Signs: 


 Last Vital Signs











Temp  98.6 F   01/04/18 12:01


 


Pulse  96 H  01/04/18 13:07


 


Resp  20   01/04/18 13:07


 


BP  135/75   01/04/18 13:07


 


Pulse Ox  94 L  01/04/18 12:01














- Labs


Result Diagrams: 


 01/04/18 13:46





 01/04/18 06:56


Labs: 


 Laboratory Results - last 24 hr











  01/03/18 01/03/18 01/03/18





  09:28 16:38 21:00


 


WBC   


 


RBC   


 


Hgb   


 


Hct   


 


MCV   


 


MCH   


 


MCHC   


 


RDW   


 


Plt Count   


 


MPV   


 


Neut % (Auto)   


 


Lymph % (Auto)   


 


Mono % (Auto)   


 


Eos % (Auto)   


 


Baso % (Auto)   


 


Neut #   


 


Lymph #   


 


Mono #   


 


Eos #   


 


Baso #   


 


Neutrophils % (Manual)   


 


Band Neutrophils %   


 


Lymphocytes % (Manual)   


 


Monocytes % (Manual)   


 


Eosinophils % (Manual)   


 


Platelet Estimate   


 


Ovalocytes   


 


[place] Cells   


 


PT   


 


INR   


 


Sodium   


 


Potassium   


 


Chloride   


 


Carbon Dioxide   


 


Anion Gap   


 


BUN   


 


Creatinine   


 


Est GFR ( Amer)   


 


Est GFR (Non-Af Amer)   


 


POC Glucose (mg/dL)   178 H  199 H


 


Random Glucose   


 


Calcium   


 


Phosphorus  3.2  


 


Magnesium  2.0  


 


Total Bilirubin   


 


AST   


 


ALT   


 


Alkaline Phosphatase   


 


Total Protein   


 


Albumin   


 


Globulin   


 


Albumin/Globulin Ratio   














  01/04/18 01/04/18 01/04/18





  06:34 06:56 06:56


 


WBC   11.9 H 


 


RBC   4.95 


 


Hgb   13.1 


 


Hct   39.7 


 


MCV   80.1 


 


MCH   26.5 L 


 


MCHC   33.1 


 


RDW   14.5 


 


Plt Count   173 


 


MPV   9.6 


 


Neut % (Auto)   82.9 H 


 


Lymph % (Auto)   8.0 L 


 


Mono % (Auto)   8.3 


 


Eos % (Auto)   0.6 


 


Baso % (Auto)   0.2 


 


Neut #   9.9 H 


 


Lymph #   1.0 


 


Mono #   1.0 H 


 


Eos #   0.1 


 


Baso #   0.0 


 


Neutrophils % (Manual)   80 H 


 


Band Neutrophils %   1 


 


Lymphocytes % (Manual)   10 L 


 


Monocytes % (Manual)   8 


 


Eosinophils % (Manual)   1 


 


Platelet Estimate   Normal 


 


Ovalocytes   


 


[place] Cells   


 


PT   


 


INR   


 


Sodium    137


 


Potassium    3.4 L


 


Chloride    103


 


Carbon Dioxide    26


 


Anion Gap    11


 


BUN    15


 


Creatinine    1.0


 


Est GFR ( Amer)    > 60


 


Est GFR (Non-Af Amer)    > 60


 


POC Glucose (mg/dL)  203 H  


 


Random Glucose    219 H


 


Calcium    8.0 L


 


Phosphorus    2.6


 


Magnesium    1.7


 


Total Bilirubin    0.8


 


AST    32


 


ALT    33


 


Alkaline Phosphatase    74


 


Total Protein    7.1


 


Albumin    3.5


 


Globulin    3.6


 


Albumin/Globulin Ratio    1.0














  01/04/18 01/04/18 01/04/18





  11:15 13:46 13:46


 


WBC   11.0 H 


 


RBC   4.97 


 


Hgb   13.2 


 


Hct   39.8 


 


MCV   80.2 


 


MCH   26.5 L 


 


MCHC   33.1 


 


RDW   14.4 


 


Plt Count   165 


 


MPV   9.2 


 


Neut % (Auto)   82.7 H 


 


Lymph % (Auto)   8.6 L 


 


Mono % (Auto)   8.5 


 


Eos % (Auto)   0.0 


 


Baso % (Auto)   0.2 


 


Neut #   9.1 H 


 


Lymph #   1.0 


 


Mono #   0.9 H 


 


Eos #   0.0 


 


Baso #   0.0 


 


Neutrophils % (Manual)   84 H 


 


Band Neutrophils %   


 


Lymphocytes % (Manual)   9 L 


 


Monocytes % (Manual)   6 


 


Eosinophils % (Manual)   1 


 


Platelet Estimate   Normal 


 


Ovalocytes   Slight 


 


[place] Cells   Slight 


 


PT    13.4 H


 


INR    1.2


 


Sodium   


 


Potassium   


 


Chloride   


 


Carbon Dioxide   


 


Anion Gap   


 


BUN   


 


Creatinine   


 


Est GFR ( Amer)   


 


Est GFR (Non-Af Amer)   


 


POC Glucose (mg/dL)  243 H  


 


Random Glucose   


 


Calcium   


 


Phosphorus   


 


Magnesium   


 


Total Bilirubin   


 


AST   


 


ALT   


 


Alkaline Phosphatase   


 


Total Protein   


 


Albumin   


 


Globulin   


 


Albumin/Globulin Ratio   














Assessment & Plan





- Assessment and Plan (Free Text)


Assessment: 





DM / HTN


Hyperlipidemia


Atrial fibrillation


Sepsis - UTI, gram + bacteremia


Vomiting


CT imaging reviewed by me showing dilated small bowel loops, gastric distention 


Clinical scenario suggestive of SBO related to underlying infectious process, s/

p NGT decompression


Plan: 





- NPO


- Continue to monitor NGT output, follow up surgical recommendations


- Continue with antibiotic therapy as per ID


- H/H stable, continue to monitor


- Continue with PPI therapy


- No planned GI intervention, will continue to monitor patient clinical course

## 2018-01-04 NOTE — CP.PCM.PN
<Geneva Eason - Last Filed: 18 15:19>





Subjective





- Date & Time of Evaluation


Date of Evaluation: 18


Time of Evaluation: 07:22





- Subjective


Subjective: 





Medicine Progress Note: Hospitalist Service





Patient seen and examined at bedside. Patient states that he was vomiting last 

night. At this time he denies feeling nauseous. Denies fevers, chills, headaches

, dizziness, cp, palpitations, sob, abdominal pain, urinary symptoms, changes 

in bowel habits. 





Objective





- Vital Signs/Intake and Output


Vital Signs (last 24 hours): 


 











Temp Pulse Resp BP Pulse Ox


 


 98.1 F   92 H  20   139/76   95 


 


 18 01:17  18 06:26  18 00:00  18 00:00  18 00:00








Intake and Output: 


 











 18





 06:59 18:59


 


Output Total 1700 


 


Balance -1700 














- Medications


Medications: 


 Current Medications





Acetaminophen (Tylenol 325mg Tab)  650 mg PO Q6 PRN


   PRN Reason: Fever >100.4 F


   Last Admin: 18 00:17 Dose:  650 mg


Albuterol/Ipratropium (Duoneb 3 Mg/0.5 Mg (3 Ml) Ud)  3 ml INH RQ6 PRN


   PRN Reason: Cough


Amiodarone HCl (Cordarone)  100 mg PO DAILY [facility]


   Last Admin: 18 11:30 Dose:  100 mg


Amlodipine Besylate (Norvasc)  10 mg PO DAILY [facility]


   Last Admin: 18 09:35 Dose:  10 mg


Aspirin (Aspirin Chewable)  81 mg PO DAILY [facility]


   Last Admin: 18 09:36 Dose:  81 mg


Dextrose (Dextrose 50% Inj)  0 ml IV STAT PRN; Protocol


   PRN Reason: Hypoglycemia Protocol


Dextrose (Glutose 15)  0 gm PO ONCE PRN; Protocol


   PRN Reason: Hypoglycemia Protocol


Glucagon (Glucagen Diagnostic Kit)  0 mg IM STAT PRN; Protocol


   PRN Reason: Hypoglycemia Protocol


Heparin Sodium (Porcine) (Heparin)  5,000 units SC Q12 [facility]


   Last Admin: 18 21:52 Dose:  5,000 units


Dextrose (Dextrose 5% In Water 1000 Ml)  1,000 mls @ 0 mls/hr IV .Q0M PRN; 

Protocol; Per Protocol


   PRN Reason: Hypoglycemia Protocol


Vancomycin/Sodium Chloride (Vancomycin 1 Gm/Ns 200 Ml)  1 gm in 200 mls @ 166.7 

mls/hr IVPB Q24H [facility]


   Stop: 18 10:01


   Last Admin: 18 12:37 Dose:  166.7 mls/hr


Sodium Chloride (Sodium Chloride 0.9%)  1,000 mls @ 75 mls/hr IV .[id number] [facility]


   Last Admin: 18 21:54 Dose:  Not Given


Piperacillin Sod/Tazobactam Sod (Zosyn 2.25 Gm Iv Premix)  2.25 gm in 50 mls @ 

100 mls/hr IVPB Q8H [facility]


   Last Admin: 18 00:17 Dose:  100 mls/hr


Insulin Glargine (Lantus)  18 unit SC [facility]


   Last Admin: 18 21:53 Dose:  18 u


Insulin Human Regular (Novolin R)  [facility]


   PRN Reason: Protocol


   Last Admin: 18 21:58 Dose:  Not Given


Metoprolol Succinate (Toprol Xl)  50 mg PO DAILY [facility]


   Last Admin: 18 09:35 Dose:  50 mg


Omega-3-Acid Ethyl Esters (Lovaza)  1 gm PO BID [facility]


   Last Admin: 18 17:19 Dose:  1 gm


Ondansetron HCl (Zofran Inj)  4 mg IVP Q6H PRN


   PRN Reason: Nausea/Vomiting


   Last Admin: 18 03:42 Dose:  4 mg


Oxybutynin Chloride (Ditropan Xl)  5 mg PO DAILY [facility]


   Last Admin: 18 11:30 Dose:  5 mg


Pantoprazole Sodium (Protonix Ec Tab)  40 mg PO DAILY [facility]


   Last Admin: 18 09:35 Dose:  40 mg


Rosuvastatin Calcium (Crestor)  10 mg PO HS [facility]


   Last Admin: 18 21:53 Dose:  10 mg


Saccharomyces Boulardii (Florastor)  250 mg PO BID [facility]


   Last Admin: 18 17:19 Dose:  250 mg


Sitagliptin Phosphate (Januvia)  50 mg PO DAILY [facility]


   Last Admin: 18 09:35 Dose:  50 mg











- Labs


Labs: 


 





 18 06:56 





 18 07:40 











- Constitutional


Appears: Non-toxic, No Acute Distress





- Head Exam


Head Exam: ATRAUMATIC, NORMAL INSPECTION





- Eye Exam


Eye Exam: EOMI, Normal appearance





- ENT Exam


ENT Exam: Mucous Membranes Moist





- Neck Exam


Neck Exam: Full ROM





- Respiratory Exam


Respiratory Exam: NORMAL BREATHING PATTERN.  absent: Accessory Muscle Use, Rales

, Wheezes


Additional comments: 





Harsh breath sounds appreciated throughout 





- Cardiovascular Exam


Cardiovascular Exam: REGULAR RHYTHM, +S1, +S2





- GI/Abdominal Exam


GI & Abdominal Exam: Distended, Soft, Normal Bowel Sounds.  absent: Guarding, 

Rigid, Tenderness





- Extremities Exam


Extremities Exam: Normal Inspection





- Neurological Exam


Neurological Exam: Alert, Awake


Neuro motor strength exam: Left Upper Extremity: 0, Right Upper Extremity: 5, 

Left Lower Extremity: 3, Right Lower Extremity: 5





- Psychiatric Exam


Psychiatric exam: Normal Affect, Normal Mood





- Skin


Skin Exam: Dry, Normal Color, Warm





Assessment and Plan





- Assessment and Plan (Free Text)


Assessment: 





(1) Coffee ground emesis, abdominal distention


Assessment and Plan:


* Patient had moderately distended abdomen with episode of coffee ground emesis 

this morning


* CT abd/pelvis: distended stomach, mild proximal small bowel wall thickening, 

correlate clinically for possible enteritis (per discussion with Radiologist)


* General Surgery Consulted, Dr Kulkarni, help appreciated


* NGT was placed, approx 550cc of coffee ground emesis in cannister


* GI consulted, Dr Khoury, help appreciated


* Continue Protonix 40mg IVP


* Diet NPO


* Heparin SC held


* CBC, T+S, INR ordered STAT


* Vital signs are stable


* NS @ 75cc/hr


* Continue antibiotics





(2) Urosepsis, Urinary tract infection, Bacteremia


Assessment and Plan: 


* Code Sepsis 18: 11:40 AM in ED. Criteria - elevated WBC, elevated HR, 

Fever, elevated Lactate. In the ED -> Aztreonam, Vancomycin, NS bolus.


* Patient not given fluid as 30 mg/kg given concern for congestive heart 

failure.


* Patient has been afebrile > 24 hours 


* Leukocytosis 11.9 today, improving 


* Antibiotics: Zosyn 2.25 mg IVP Q8H, Vancomycin 1g IVP Q24H, Florastor 250mg 

PO BID


* Tylenol 650mg PO Q6H PRN for fever


* NS @ 75 cc/hr


* Urine cx growing enterobacter aerogenes


 * Sensitivities reviewed, will discuss with ID if abx will need to be changed


* First Blood cx growing gram positive cocci (may be contaminant), second blood 

cx showed no growth


* Blood cultures have been repeated


* Procal 0.80, Lactic acid normalized


* Throat culture showing no growth, Flu negative, urine legionella negative, 


* F/u repeat sputum culture


* Duonebs PRN shortness of breathe


* Oxygen 3 Liter for nasal cannula


* Infectious Disease on consult, Dr Quinteros, help appreciated 


* Imaging:


 * CT chest w/o contrast: No consolidative infiltrate suggested, Mild bibasilar 

peripheral subplerual septal lines noted, Bilateral renal masses, There are at 

least 2 right renal masses that appear hypodense (please see full report)


 * Bladder US: Renal cysts; right kidney 11.3x7.6x10.2cm cyst, 1.3x1.3.1.2cm 

cyst; left kidney 1.3x.10.0.9cm cyst 


 * CXR: No consolidation, cardiomegaly with limited visualization of the left 

costophrenic angle (see full report)


 * CT abd/pelvis: 11.1 x 10.2 cm complex right cystic lesion with evidence of 

calcification and septation. 7mm right upper pole echogenic lesion, 

indeterminate. 17mm low-density, left upper pole kidney 


Status: Acute   Priority: High   





(3) History of Atrial fibrillation


Assessment and Plan: 


* Hx of Pacemaker (or AICD)


* CHADS score 5 -> 12.5% risk of event per yr w/o AO; HASBLED 4 -> Patient at 

high risk for major bleeding


* Aspirin 81mg PO QDaily- on hold at this time 


* Metoprolol Succinate 50mg PO QDaily - on hold at this time 


* Increased Amiodarone 200mg PO QDaily - on hold at this time


* TSH and Free T4 within normal limits


* Patient sees Dr Astudillo outpatient


* Per discussion with Dr Dent, patient is supposed to be on Xarelto 20mg PO 

daily


* Cardiology on consult, Dr Chapman, help appreciated


Status: Chronic   Priority: High   





(4) Cardiomegaly


Assessment and Plan: 


* Echo from  showed normal EF and some possible diastolic dysfunction


* Repeat Echo completed, f/u official report


* Per cardio, patient has preserved LVEF with mild mitral regurgitation


* Gentle IV hydration


Status: Acute   Priority: High   





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


Assessment and Plan: 


* Aspirin 81mg PO QD - on hold at this time


* Crestor 10 mg PO HS - on hold at this time


* Blood pressure control


Status: Chronic   Priority: Medium   





(6) Diabetes mellitus


Assessment and Plan: 


* Lantus 18 units HS


* ISS medium dose, Accuchecks ACHS


* Consistent carb diet


* HgbA1C 8.1


* Januvia 50mg PO QDaily - on hold at this tme


* HOLD metformin 1000mg PO BID 2/2 mild elevation in lactate


* Hypoglycemia protocol


Status: Chronic   Priority: Medium   





(7) HTN (hypertension)


Assessment and Plan: 


* BP well controlled


* Metoprolol succinate 50mg PO QDaily - on hold at this time


* Norvasc 10mg PO QDaily - on hold at this time


* Lopressor 5mg IVP Q6H prn SBP > 160


Status: Chronic   Priority: High   





(8) Lipid disorder


Assessment and Plan: 


* Lipid panel within normal limits


* Lovaza 1g PO BID - on hold at this time


* Crestor 10 mg PO HS - on hold at this time


Status: Chronic 





(9) Abnormal urinalysis


Assessment and Plan: 


* History of Urinary Incontinence


* Straight cath in ED with 200ml of normal appearing urine given he appeared he 

had difficulty urinating in urinal


* Bladder scan PRN


* Urine culture growing enterobacter aerogenes, sensitivities reviewed


* Contiue Oxybutynin XL 5mg PO QDaily - on hold 


* CT chest comments on renal masses, Bladder US showed renal cysts


* Per PMD, in  patient had right renal cyst that measured 9cm


* Urology on consult, Dr Bender, help appreciated 


Status: Acute   





(10) Prophylactic measure


Assessment and Plan: 


* Protonix 40mg IVP daily


* SCDs


* Heparin 5000u SC Q12H discontinued 


* Florastor 250mg PO BID - on hold at this time 


* PT/OT eval and treat - PT recommending [facility] for disposition 











<Juliann Bird - Last Filed: 18 17:53>





Objective





- Vital Signs/Intake and Output


Vital Signs (last 24 hours): 


 











Temp Pulse Resp BP Pulse Ox


 


 98.8 F   92 H  20   134/76   96 


 


 18 15:35  18 15:35  18 15:35  18 15:35  18 15:35








Intake and Output: 


 











 18





 06:59 18:59


 


Intake Total  495


 


Output Total 1700 1050


 


Balance -1700 -555














- Medications


Medications: 


 Current Medications





Acetaminophen (Tylenol 325mg Tab)  650 mg PO Q6 PRN


   PRN Reason: Fever >100.4 F


   Last Admin: 18 00:17 Dose:  650 mg


Albuterol/Ipratropium (Duoneb 3 Mg/0.5 Mg (3 Ml) Ud)  3 ml INH RQ6 PRN


   PRN Reason: Cough


Amiodarone HCl (Cordarone)  200 mg PO DAILY [facility]


   Last Admin: 18 12:27 Dose:  Not Given


Amlodipine Besylate (Norvasc)  10 mg PO DAILY [facility]


   Last Admin: 18 12:26 Dose:  Not Given


Aspirin (Aspirin Chewable)  81 mg PO DAILY [facility]


   Last Admin: 18 12:27 Dose:  Not Given


Dextrose (Dextrose 50% Inj)  0 ml IV STAT PRN; Protocol


   PRN Reason: Hypoglycemia Protocol


Dextrose (Glutose 15)  0 gm PO ONCE PRN; Protocol


   PRN Reason: Hypoglycemia Protocol


Glucagon (Glucagen Diagnostic Kit)  0 mg IM STAT PRN; Protocol


   PRN Reason: Hypoglycemia Protocol


Heparin Sodium (Porcine) (Heparin)  5,000 units SC Q12 [facility]


   Last Admin: 18 13:29 Dose:  Not Given


Dextrose (Dextrose 5% In Water 1000 Ml)  1,000 mls @ 0 mls/hr IV .Q0M PRN; 

Protocol; Per Protocol


   PRN Reason: Hypoglycemia Protocol


Vancomycin/Sodium Chloride (Vancomycin 1 Gm/Ns 200 Ml)  1 gm in 200 mls @ 166.7 

mls/hr IVPB Q24H [facility]


   Stop: 18 10:01


   Last Admin: 18 12:11 Dose:  166.7 mls/hr


Sodium Chloride (Sodium Chloride 0.9%)  1,000 mls @ 75 mls/hr IV .[id number] [facility]


   Last Admin: 18 12:25 Dose:  Not Given


Piperacillin Sod/Tazobactam Sod (Zosyn 2.25 Gm Iv Premix)  2.25 gm in 50 mls @ 

100 mls/hr IVPB Q8H [facility]


   Last Admin: 18 08:49 Dose:  100 mls/hr


Insulin Glargine (Lantus)  18 unit SC HS [facility]


   Last Admin: 18 21:53 Dose:  18 u


Insulin Human Regular (Novolin R)  0 unit SC ACHS [facility]


   PRN Reason: Protocol


   Last Admin: 18 17:16 Dose:  Not Given


Metoprolol Succinate (Toprol Xl)  50 mg PO DAILY [facility]


   Last Admin: 18 12:25 Dose:  Not Given


Metoprolol Tartrate (Lopressor)  5 mg IVP Q6H [facility]N


   PRN Reason: Systolic Blood Pressure


Omega-3-Acid Ethyl Esters (Lovaza)  1 gm PO BID [facility]


   Last Admin: 18 12:26 Dose:  Not Given


Ondansetron HCl (Zofran Inj)  4 mg IVP Q6H [facility]


   PRN Reason: Nausea/Vomiting


   Last Admin: 18 03:42 Dose:  4 mg


Oxybutynin Chloride (Ditropan Xl)  5 mg PO DAILY [facility]


   Last Admin: 18 12:26 Dose:  Not Given


Pantoprazole Sodium (Protonix Inj)  40 mg IVP Q12H [facility]


   Last Admin: 18 13:30 Dose:  40 mg


Rivaroxaban (Xarelto)  20 mg PO DAILY [facility]


Rosuvastatin Calcium (Crestor)  10 mg PO [facility]


   Last Admin: 18 21:53 Dose:  10 mg


Saccharomyces Boulardii (Florastor)  250 mg PO BID [facility]


   Last Admin: 18 12:26 Dose:  Not Given


Sitagliptin Phosphate (Januvia)  50 mg PO DAILY [facility]


   Last Admin: 18 12:26 Dose:  Not Given











- Labs


Labs: 


 





 18 13:46 





 18 06:56 





 











PT  13.4 SECONDS (9.7-12.2)  H  18  13:46    


 


INR  1.2   18  13:46    














Attending/Attestation





- Attestation


I have personally seen and examined this patient.: Yes


I have fully participated in the care of the patient.: Yes


I have reviewed all pertinent clinical information, including history, physical 

exam and plan: Yes


Notes (Text): 


Patient seen, examined, case discussed with medical resident.


Patient seen this morning and noted and vomited up breakfast. Per discussion 

with resident patient also had thrown up overnight. There is a question of 

coffee-ground emesis given it appeared brown. In light of possible coffee-

ground emesis, aspirin, chemical anticoagulation, were held. Hemoglobin. It 

appeared stable vital signs appear stable. Patient had complete abdomen pole 

without contrast was ordered yesterday. When reviewed CAT scan, concern for 

possible SPL given patient is nauseous and has noted abdominal distention. 

General surgery was consulted. Surgical risks and came to evaluate patient 

pleased NG tube about 550s cc of gastric contents brown possibly coffee-ground 

emesis was removed. Abdomen appears better compared to before. GI was 

consultative and question of coffee-ground emesis. He recommends to keep 

nothing by mouth continue IV antibiotics for noted enteritis and transient SBO. 

Will repeat CBC and INR to make showed no drop in hemoglobin.





Urine culture finalized with [facility] for Cefepime, Ciprofloxacin, and Invanez.


Blood cultures from  only 1 out of 2 bottles showing for possible 

bacteremia were obtained gram-positive cocci but not identified yet. ID has 

ordered for repeat cultures on January during which are received but not yet 

resulted.


White count has improved from 19 to 11 and patient has remained afebrile for 24 

hours.





Resident has spoken with patient's outpatient primary care doctor. Patient does 

have history of falls which is why he he is now on chemical anticoagulation and 

he is noted to have renal cyst which was diagnosed in  which is about 9 cm.





We'll continue monitor patient on telemetry. Keep nothing by mouth. Give gentle 

IV hydration. Echocardiogram is still not read officially. Continue IV 

antibiotics. GI, general surgery, and infectious disease on board.





Assessment/Plan


(1) Sepsis


Assessment and Plan: 


* Suspecting:urinary tract infection and possible bacteremia


* Code Sepsis: 11:40 AM on 18 in ED. Criteria - elevated WBC, elevated HR, 

Fever, elevated Lactate. In the ED -> Aztreonam, Vancomycin, NS bolus.


* Patient not given fluid as 30 mg/kg given concern for congestive heart 

failure.


* Infectious Disease consult, Dr Quinteros on board


* Pending official read of echocardiogram


* Flu NEGATIVE


* Blood culture (18 11:00) gram positive cocci, gram positive cocci #2


* Blood culture (18 10:45): no growth after 48 hours


* Blood culture (1/3/18): received but not yet result


* Urine culture (18): Enterobacter Aerogenes-->[facility] for Cefepime, Invanz, 

and Ciprofloxocin


* Negative for strep throat


* Sputum (1/3/17): contaminated


* F/U strep pnumoniae Ag, mycoplasma IgM, legionella Ag: negative


* Procalcitonin: 0.80


 Imaging:


 * CT chest w/o contrast (18): no consolidative infiltrate suggested, mild 

bibasilar peripheral subpleural septal lines. b/l renal masses. no gross 

hydronephrosis appreciated. few small left parapelvic renal cyst possible, mild 

perirenal stranding inflammatory changes present


 * Will order for abdomen/pelvis  given possible pyleonephritis


 * F/U official report for CXR


* Duonebs PRN shortness of breathe


* Oxygen 3 Liter for nasal cannula





Meds:


Tylenol 650mg PO Q6H PRN for fever


Zosyn 2.25 mg IVP Q8H


Vancomycin 1g IVP Q24H


Status: Acute   Priority: High   





(2) Small Bowel Obstruction


       Enteritis


       Possible coffee ground emesis


Assessment and Plan: 


* General surgery (Dr. Kulkarni) on board-->help appreciated


* GI (Dr. Medina) on board-->help appreciated


* NGT placed 18 550 abdominal contents question of coffee ground emesis 

removed. Abdomen exam improved.


* CT Abdomen/Pelvis (w/o contrast): 11.11 X 10.2cm complex right cystic lesion 

with calcification and septation. nonobstructing left renal calculus. 

Distension of the stomach. Mild proximal small bowel wall thickening. Blood 

loop appear within normal limits of caliber


* Aspirin, Xarelto held in light of possible coffee ground emesis


* Medications switched to IV if possible


Status: Acute   Priority: High 





(3) History of Atrial fibrillation


Assessment and Plan: 


* Hx of Pacemaker (or AICD)


* Cardiology consult, Dr Chapman


* CHADS 5 -> 12.5% risk of event per yr w/o AO; HASBLED 4 -> Patient at high 

risk for major bleeding


* HOLD Aspirin 81mg PO QDaily-->possible coffee ground emesis


* HOLD Metoprolol Succinate 50mg PO QDaily secondary to vomitting/SBO-->

switched to Lopressor 5mg IV Q 6H given if SBP>160


* Hold Increased Amiodarone 200mg PO QDaily  secondary to vomitting/SBO


* TSH/Free T4


* Resident has spoken with patient's family physician: chemical anticoagulation 

had be discontinued secondary to falls.


Status: Chronic   Priority: High   





(4) Cardiomegaly


Assessment and Plan: 


* Echo from  showed normal EF and some possible diastolic dysfunction


* F/U Echo--Pending official read; cardiology on call noted normal V function, 

and mild MR


* Gentle IV hydration


* CT chest w/o contrast (18): no consolidative infiltrate suggested, mild 

bibasilar peripheral subpleural septal lines. b/l renal masses. no gross 

hydronephrosis appreciated. few small left parapelvic renal cyst possible, mild 

perirenal stranding inflammatory changes present


Status: Acute   Priority: High   





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


Assessment and Plan: 


* HOLD Aspirin 81mg PO QDaily-->possible coffee ground emesis


* hold Crestor 10 mg PO HS (Patient home Lipitor 20mg not carried in house) 

secondary to vomitting/SBO


* Blood pressure control with Lopressor IV PRN


* PT and OT on board


Status: Chronic   Priority: Medium   





(5) Diabetes mellitus


Assessment and Plan: 


* Accuchecks ACHS


* RISS medium dose


* Consistent carb diet


* HgbA1C: 8.1


* hold Glimepiride 4mg PO QDaily secondary to vomitting/SBO


* hold Januvia 100mg PO QDaily secondary to vomitting/SBO


* HOLD metformin 1000mg PO BID 2/2 mild elevation in lactate


* Lipid panel: T, Chol: 83, LDL<30, HDL: 27


* Hypoglycemic protocol


* Switch to Accuchecks Q6H


* Lower Lantus 9 units (prior 18 units) subqHS to prevent hypoglycemia


Status: Chronic   Priority: Medium   





(6) HTN (hypertension)


Assessment and Plan: 


* Lopressor 5mg IV Q 6H PRN SBP>160


* Hold Metoprolol succinate 50mg PO QDaily secondary to vomitting/SBO


* hold Norvasc 10mg PO QDaily secondary to vomitting/SBO


* HOLD home benazepril 40mg 2/2 to elevated potassium


Status: Chronic   Priority: High   





(7) Lipid disorder


Assessment and Plan: 


* Lipid panel: T, Chol: 83, LDL<30, HDL: 27


* hold Lovaza 1g PO BID secondary to vomitting/SBO


* hold Crestor 10 mg PO HS (Patient home Lipitor 20mg not carried in house) 

secondary to vomitting/SBO


Status: Chronic 





(8) Urinary Tract Infection


       History of Renal Cyst


Assessment and Plan: 


* History of Urinary Incontinence


* Straight cath in ED with 200ml of normal appearing urine given he appeared he 

had difficulty urinating in urinal


* Hold :Oxybutynin XL 5mg PO QDaily secondary to vomitting/SBO


* CT Abdomen/Pelvis (w/o contrast): 11.11 X 10.2cm complex right cystic lesion 

with calcification and septation. nonobstructing left renal calculus. 

Distension of the stomach. Mild proximal small bowel wall thickening. Blood 

loop appear within normal limits of caliber


* Renal US: renal cysts noted


Status: Acute   





(9) Prophylactic measure


Assessment and Plan: 


* Protonix 40mg IV Q12H


* SCDs


* hold Heparin 5000u SC Q8H possible coffee ground emesis


* Florastor 250mg PO BID secondary to vomitting/SBO


* PT/OT eval and treat: recommend for [facility]





Disposition: Patient is currently NPO on IV antibiotics for concern for SBO and 

coffee ground emesis. GI and General surgery consult. Awaiting 1/3/18 blood 

cultures and final report of 18 blood culture. Pending official report of 

echo.

## 2018-01-04 NOTE — CP.PCM.CON
History of Present Illness





- History of Present Illness


History of Present Illness: 


General Surgery Consult


Re: Concern for SBO





HPI: 77M initially presented to ED with weakness, lethargy, fevers and chills.  

Per family, on 1/2/17 he began to have chills and was no longer able to get 

around with his cane, so they brought him in. Code Sepsis was called and he was 

found to have a UTI. Pt began having brownish emesis today so a CT abd/pelvis 

was done and showed an enlarged stomach and possible mild proximal enteritis, 

so surgery was consulted for concern for SBO. + recent sick contact (wife), + 

incontinence. Pt denies any abd pain, fevers, chills, nausea, distention, chest 

pain, palpitations at this time. Pt cannot recall last BM.  





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


PSH: Pacemaker, Cholecystectomy


SH: Former Tobacco and EtOH user, No drug use.


All: NKDA


Meds: See MAR





Review of Systems





- Review of Systems


All systems: reviewed and no additional remarkable complaints except (as per hpi

)





Past Patient History





- Past Medical History & Family History


Past Medical History?: Yes





- Past Social History


Smoking Status: Former Smoker





- CARDIAC


Hx Cardiac Disorders: Yes (A.Fib)


Hx Hypercholesterolemia: Yes


Hx Hypertension: Yes





- PULMONARY


Hx Respiratory Disorders: No





- NEUROLOGICAL


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





- HEENT


Hx HEENT Problems: No





- RENAL


Hx Chronic Kidney Disease: No





- ENDOCRINE/METABOLIC


Hx Diabetes Mellitus Type 2: Yes





- HEMATOLOGICAL/ONCOLOGICAL


Hx Blood Disorders: No





- INTEGUMENTARY


Hx Dermatological Problems: No





- MUSCULOSKELETAL/RHEUMATOLOGICAL


Hx Falls: Yes





- GASTROINTESTINAL


Hx Gastrointestinal Disorders: No





- GENITOURINARY/GYNECOLOGICAL


Hx Genitourinary Disorders: No





- PSYCHIATRIC


Hx Substance Use: No





- SURGICAL HISTORY


Hx Surgeries: Yes


Hx Cholecystectomy: Yes





- ANESTHESIA


Hx Anesthesia: Yes


Hx Anesthesia Reactions: No


Hx Malignant Hyperthermia: No





Meds


Allergies/Adverse Reactions: 


 Allergies











Allergy/AdvReac Type Severity Reaction Status Date / Time


 


No Known Allergies Allergy   Verified 01/02/18 10:33














- Medications


Medications: 


 Current Medications





Acetaminophen (Tylenol 325mg Tab)  650 mg PO Q6 PRN


   PRN Reason: Fever >100.4 F


   Last Admin: 01/04/18 00:17 Dose:  650 mg


Albuterol/Ipratropium (Duoneb 3 Mg/0.5 Mg (3 Ml) Ud)  3 ml INH RQ6 PRN


   PRN Reason: Cough


Amiodarone HCl (Cordarone)  200 mg PO DAILY [facility]


   Last Admin: 01/04/18 12:27 Dose:  Not Given


Amlodipine Besylate (Norvasc)  10 mg PO DAILY [facility]


   Last Admin: 01/04/18 12:26 Dose:  Not Given


Aspirin (Aspirin Chewable)  81 mg PO DAILY [facility]


   Last Admin: 01/04/18 12:27 Dose:  Not Given


Dextrose (Dextrose 50% Inj)  0 ml IV STAT PRN; Protocol


   PRN Reason: Hypoglycemia Protocol


Dextrose (Glutose 15)  0 gm PO ONCE PRN; Protocol


   PRN Reason: Hypoglycemia Protocol


Glucagon (Glucagen Diagnostic Kit)  0 mg IM STAT PRN; Protocol


   PRN Reason: Hypoglycemia Protocol


Heparin Sodium (Porcine) (Heparin)  5,000 units SC Q12 [facility]


   Last Admin: 01/04/18 13:29 Dose:  Not Given


Dextrose (Dextrose 5% In Water 1000 Ml)  1,000 mls @ 0 mls/hr IV .Q0M PRN; 

Protocol; Per Protocol


   PRN Reason: Hypoglycemia Protocol


Vancomycin/Sodium Chloride (Vancomycin 1 Gm/Ns 200 Ml)  1 gm in 200 mls @ 166.7 

mls/hr IVPB Q24H [facility]


   Stop: 01/08/18 10:01


   Last Admin: 01/04/18 12:11 Dose:  166.7 mls/hr


Sodium Chloride (Sodium Chloride 0.9%)  1,000 mls @ 75 mls/hr IV .[id number] [facility]


   Last Admin: 01/04/18 12:25 Dose:  Not Given


Piperacillin Sod/Tazobactam Sod (Zosyn 2.25 Gm Iv Premix)  2.25 gm in 50 mls @ 

100 mls/hr IVPB Q8H [facility]


   Last Admin: 01/04/18 08:49 Dose:  100 mls/hr


Insulin Glargine (Lantus)  18 unit SC HS [facility]


   Last Admin: 01/03/18 21:53 Dose:  18 u


Insulin Human Regular (Novolin R)  0 unit SC ACHS [facility]


   PRN Reason: Protocol


   Last Admin: 01/04/18 13:09 Dose:  Not Given


Metoprolol Succinate (Toprol Xl)  50 mg PO DAILY [facility]


   Last Admin: 01/04/18 12:25 Dose:  Not Given


Metoprolol Tartrate (Lopressor)  5 mg IVP Q6H PRN


   PRN Reason: Systolic Blood Pressure


Omega-3-Acid Ethyl Esters (Lovaza)  1 gm PO BID [facility]


   Last Admin: 01/04/18 12:26 Dose:  Not Given


Ondansetron HCl (Zofran Inj)  4 mg IVP Q6H PRN


   PRN Reason: Nausea/Vomiting


   Last Admin: 01/04/18 03:42 Dose:  4 mg


Oxybutynin Chloride (Ditropan Xl)  5 mg PO DAILY [facility]


   Last Admin: 01/04/18 12:26 Dose:  Not Given


Pantoprazole Sodium (Protonix Inj)  40 mg IVP Q12H [facility]


   Last Admin: 01/04/18 13:30 Dose:  40 mg


Rivaroxaban (Xarelto)  20 mg PO DAILY [facility]


Rosuvastatin Calcium (Crestor)  10 mg PO HS [facility]


   Last Admin: 01/03/18 21:53 Dose:  10 mg


Saccharomyces Boulardii (Florastor)  250 mg PO BID [facility]


   Last Admin: 01/04/18 12:26 Dose:  Not Given


Sitagliptin Phosphate (Januvia)  50 mg PO DAILY [facility]


   Last Admin: 01/04/18 12:26 Dose:  Not Given











Physical Exam





- Constitutional


Appears: Non-toxic, No Acute Distress


Additional comments: 


Only reponds through head nodding





- Head Exam


Head Exam: ATRAUMATIC, NORMOCEPHALIC





- Eye Exam


Eye Exam: EOMI.  absent: Scleral icterus





- ENT Exam


ENT Exam: Mucous Membranes Moist


Additional comments: 





trachea midline





- Respiratory Exam


Respiratory Exam: NORMAL BREATHING PATTERN.  absent: Respiratory Distress





- Cardiovascular Exam


Cardiovascular Exam: +S1, +S2





- GI/Abdominal Exam


GI & Abdominal Exam: Soft.  absent: Distended, Guarding, Rebound, Rigid, 

Tenderness





- Rectal Exam


Rectal Exam: Deferred





- Extremities Exam


Extremities exam: Negative for: calf tenderness





- Back Exam


Back exam: absent: CVA tenderness (L), CVA tenderness (R)





- Neurological Exam


Neurological exam: Alert





- Skin


Skin Exam: Dry, Warm





Results





- Vital Signs


Recent Vital Signs: 


 Last Vital Signs











Temp  98.6 F   01/04/18 12:01


 


Pulse  96 H  01/04/18 13:07


 


Resp  20   01/04/18 13:07


 


BP  135/75   01/04/18 13:07


 


Pulse Ox  94 L  01/04/18 12:01














- Labs


Result Diagrams: 


 01/04/18 13:46





 01/04/18 06:56


Labs: 


 Laboratory Results - last 24 hr











  01/03/18 01/03/18 01/03/18





  09:28 16:38 21:00


 


WBC   


 


RBC   


 


Hgb   


 


Hct   


 


MCV   


 


MCH   


 


MCHC   


 


RDW   


 


Plt Count   


 


MPV   


 


Neut % (Auto)   


 


Lymph % (Auto)   


 


Mono % (Auto)   


 


Eos % (Auto)   


 


Baso % (Auto)   


 


Neut #   


 


Lymph #   


 


Mono #   


 


Eos #   


 


Baso #   


 


Neutrophils % (Manual)   


 


Band Neutrophils %   


 


Lymphocytes % (Manual)   


 


Monocytes % (Manual)   


 


Eosinophils % (Manual)   


 


Platelet Estimate   


 


Ovalocytes   


 


[place] Cells   


 


PT   


 


INR   


 


Sodium   


 


Potassium   


 


Chloride   


 


Carbon Dioxide   


 


Anion Gap   


 


BUN   


 


Creatinine   


 


Est GFR ( Amer)   


 


Est GFR (Non-Af Amer)   


 


POC Glucose (mg/dL)   178 H  199 H


 


Random Glucose   


 


Calcium   


 


Phosphorus  3.2  


 


Magnesium  2.0  


 


Total Bilirubin   


 


AST   


 


ALT   


 


Alkaline Phosphatase   


 


Total Protein   


 


Albumin   


 


Globulin   


 


Albumin/Globulin Ratio   














  01/04/18 01/04/18 01/04/18





  06:34 06:56 06:56


 


WBC   11.9 H 


 


RBC   4.95 


 


Hgb   13.1 


 


Hct   39.7 


 


MCV   80.1 


 


MCH   26.5 L 


 


MCHC   33.1 


 


RDW   14.5 


 


Plt Count   173 


 


MPV   9.6 


 


Neut % (Auto)   82.9 H 


 


Lymph % (Auto)   8.0 L 


 


Mono % (Auto)   8.3 


 


Eos % (Auto)   0.6 


 


Baso % (Auto)   0.2 


 


Neut #   9.9 H 


 


Lymph #   1.0 


 


Mono #   1.0 H 


 


Eos #   0.1 


 


Baso #   0.0 


 


Neutrophils % (Manual)   80 H 


 


Band Neutrophils %   1 


 


Lymphocytes % (Manual)   10 L 


 


Monocytes % (Manual)   8 


 


Eosinophils % (Manual)   1 


 


Platelet Estimate   Normal 


 


Ovalocytes   


 


[place] Cells   


 


PT   


 


INR   


 


Sodium    137


 


Potassium    3.4 L


 


Chloride    103


 


Carbon Dioxide    26


 


Anion Gap    11


 


BUN    15


 


Creatinine    1.0


 


Est GFR ( Amer)    > 60


 


Est GFR (Non-Af Amer)    > 60


 


POC Glucose (mg/dL)  203 H  


 


Random Glucose    219 H


 


Calcium    8.0 L


 


Phosphorus    2.6


 


Magnesium    1.7


 


Total Bilirubin    0.8


 


AST    32


 


ALT    33


 


Alkaline Phosphatase    74


 


Total Protein    7.1


 


Albumin    3.5


 


Globulin    3.6


 


Albumin/Globulin Ratio    1.0














  01/04/18 01/04/18 01/04/18





  11:15 13:46 13:46


 


WBC   11.0 H 


 


RBC   4.97 


 


Hgb   13.2 


 


Hct   39.8 


 


MCV   80.2 


 


MCH   26.5 L 


 


MCHC   33.1 


 


RDW   14.4 


 


Plt Count   165 


 


MPV   9.2 


 


Neut % (Auto)   82.7 H 


 


Lymph % (Auto)   8.6 L 


 


Mono % (Auto)   8.5 


 


Eos % (Auto)   0.0 


 


Baso % (Auto)   0.2 


 


Neut #   9.1 H 


 


Lymph #   1.0 


 


Mono #   0.9 H 


 


Eos #   0.0 


 


Baso #   0.0 


 


Neutrophils % (Manual)   84 H 


 


Band Neutrophils %   


 


Lymphocytes % (Manual)   9 L 


 


Monocytes % (Manual)   6 


 


Eosinophils % (Manual)   1 


 


Platelet Estimate   Normal 


 


Ovalocytes   Slight 


 


[place] Cells   Slight 


 


PT    13.4 H


 


INR    1.2


 


Sodium   


 


Potassium   


 


Chloride   


 


Carbon Dioxide   


 


Anion Gap   


 


BUN   


 


Creatinine   


 


Est GFR ( Amer)   


 


Est GFR (Non-Af Amer)   


 


POC Glucose (mg/dL)  243 H  


 


Random Glucose   


 


Calcium   


 


Phosphorus   


 


Magnesium   


 


Total Bilirubin   


 


AST   


 


ALT   


 


Alkaline Phosphatase   


 


Total Protein   


 


Albumin   


 


Globulin   


 


Albumin/Globulin Ratio   














- Imaging and Cardiology


  ** CT scan - abdomen


Status: Image reviewed by me, Report reviewed by me





Assessment & Plan





- Assessment and Plan (Free Text)


Assessment: 


77M with dilated stomach and mild enteritis





Plan: 


NGT placed, 500cc brown fluid out.


NGT to Low continuous suction, do not block sump tube. Flush PRN to check for 

patency


Replete electrolytes


NPO


Contiue abx for UTI


Monitor for BM


Zofran prn


Serial abd exams


D/W Dr. Shad D ePaz PGY4

## 2018-01-05 LAB
ALBUMIN SERPL-MCNC: 3.3 G/DL (ref 3.5–5)
ALBUMIN/GLOB SERPL: 1 {RATIO} (ref 1–2.1)
ALT SERPL-CCNC: 34 U/L (ref 21–72)
AST SERPL-CCNC: 32 U/L (ref 17–59)
BASOPHILS # BLD AUTO: 0.1 K/UL (ref 0–0.2)
BASOPHILS NFR BLD: 0.6 % (ref 0–2)
BILIRUB UR-MCNC: NEGATIVE MG/DL
BUN SERPL-MCNC: 18 MG/DL (ref 9–20)
CALCIUM SERPL-MCNC: 8.4 MG/DL (ref 8.6–10.4)
EOSINOPHIL # BLD AUTO: 0.2 K/UL (ref 0–0.7)
EOSINOPHIL NFR BLD: 1.5 % (ref 0–4)
ERYTHROCYTE [DISTWIDTH] IN BLOOD BY AUTOMATED COUNT: 14.5 % (ref 11.5–14.5)
GFR NON-AFRICAN AMERICAN: 59
GLUCOSE UR STRIP-MCNC: NORMAL MG/DL
HGB BLD-MCNC: 13 G/DL (ref 12–18)
LEUKOCYTE ESTERASE UR-ACNC: (no result) LEU/UL
LYMPHOCYTES # BLD AUTO: 1.4 K/UL (ref 1–4.3)
LYMPHOCYTES NFR BLD AUTO: 13.8 % (ref 20–40)
MAGNESIUM SERPL-MCNC: 1.9 MG/DL (ref 1.6–2.3)
MCH RBC QN AUTO: 27 PG (ref 27–31)
MCHC RBC AUTO-ENTMCNC: 33.6 G/DL (ref 33–37)
MCV RBC AUTO: 80.4 FL (ref 80–94)
MONOCYTES # BLD: 1.2 K/UL (ref 0–0.8)
MONOCYTES NFR BLD: 11.5 % (ref 0–10)
NEUTROPHILS # BLD: 7.5 K/UL (ref 1.8–7)
NEUTROPHILS NFR BLD AUTO: 72.6 % (ref 50–75)
NRBC BLD AUTO-RTO: 0 % (ref 0–2)
PH UR STRIP: 6 [PH] (ref 5–8)
PLATELET # BLD: 176 K/UL (ref 130–400)
PMV BLD AUTO: 9.1 FL (ref 7.2–11.7)
PROT UR STRIP-MCNC: NEGATIVE MG/DL
RBC # BLD AUTO: 4.83 MIL/UL (ref 4.4–5.9)
RBC # UR STRIP: (no result) /UL
SP GR UR STRIP: 1.01 (ref 1–1.03)
URINE NITRATE: NEGATIVE
UROBILINOGEN UR-MCNC: NORMAL MG/DL (ref 0.2–1)
WBC # BLD AUTO: 10.4 K/UL (ref 4.8–10.8)

## 2018-01-05 RX ADMIN — HUMAN INSULIN SCH: 100 INJECTION, SOLUTION SUBCUTANEOUS at 21:46

## 2018-01-05 RX ADMIN — TAZOBACTAM SODIUM AND PIPERACILLIN SODIUM SCH MLS/HR: 250; 2 INJECTION, SOLUTION INTRAVENOUS at 08:32

## 2018-01-05 RX ADMIN — INSULIN GLARGINE SCH: 100 INJECTION, SOLUTION SUBCUTANEOUS at 21:46

## 2018-01-05 RX ADMIN — TAZOBACTAM SODIUM AND PIPERACILLIN SODIUM SCH MLS/HR: 250; 2 INJECTION, SOLUTION INTRAVENOUS at 00:44

## 2018-01-05 RX ADMIN — TAZOBACTAM SODIUM AND PIPERACILLIN SODIUM SCH MLS/HR: 250; 2 INJECTION, SOLUTION INTRAVENOUS at 16:33

## 2018-01-05 RX ADMIN — HUMAN INSULIN SCH: 100 INJECTION, SOLUTION SUBCUTANEOUS at 16:36

## 2018-01-05 RX ADMIN — HUMAN INSULIN SCH: 100 INJECTION, SOLUTION SUBCUTANEOUS at 08:13

## 2018-01-05 RX ADMIN — VANCOMYCIN HYDROCHLORIDE SCH MLS/HR: 1 INJECTION, POWDER, LYOPHILIZED, FOR SOLUTION INTRAVENOUS at 10:42

## 2018-01-05 RX ADMIN — HUMAN INSULIN SCH: 100 INJECTION, SOLUTION SUBCUTANEOUS at 12:34

## 2018-01-05 RX ADMIN — CEFEPIME SCH MLS/HR: 1 INJECTION, SOLUTION INTRAVENOUS at 20:28

## 2018-01-05 NOTE — CP.PCM.PN
<Christo,Chandrika - Last Filed: 01/05/18 13:00>





Subjective





- Date & Time of Evaluation


Date of Evaluation: 01/05/18


Time of Evaluation: 07:00





- Subjective


Subjective: 


GI Fellow PGY4 Progress Note


Pt seen and evaluated at bedside, pt denies any abdominal pain. Per nursing no 

BM overnight and 200cc coffee ground emesis via NGT over 8hrs. 


ROS: A 12pt ROS was negative except as above. 








Objective





- Vital Signs/Intake and Output


Vital Signs (last 24 hours): 


 











Temp Pulse Resp BP Pulse Ox


 


 98.6 F   80   20   145/77   95 


 


 01/05/18 08:39  01/05/18 08:39  01/05/18 08:39  01/05/18 08:39  01/05/18 08:39








Intake and Output: 


 











 01/05/18 01/05/18





 06:59 18:59


 


Intake Total 1300 


 


Output Total 1850 


 


Balance -550 














- Medications


Medications: 


 Current Medications





Acetaminophen (Tylenol 325mg Tab)  650 mg PO Q6 PRN


   PRN Reason: Fever >100.4 F


   Last Admin: 01/04/18 00:17 Dose:  650 mg


Albuterol/Ipratropium (Duoneb 3 Mg/0.5 Mg (3 Ml) Ud)  3 ml INH RQ6 PRN


   PRN Reason: Cough


Amiodarone HCl (Cordarone)  200 mg PO DAILY [facility]


   Last Admin: 01/05/18 10:43 Dose:  Not Given


Amlodipine Besylate (Norvasc)  10 mg PO DAILY [facility]


   Last Admin: 01/04/18 12:26 Dose:  Not Given


Aspirin (Aspirin Chewable)  81 mg PO DAILY [facility]


   Last Admin: 01/04/18 12:27 Dose:  Not Given


Dextrose (Dextrose 50% Inj)  0 ml IV STAT PRN; Protocol


   PRN Reason: Hypoglycemia Protocol


Dextrose (Glutose 15)  0 gm PO ONCE PRN; Protocol


   PRN Reason: Hypoglycemia Protocol


Glucagon (Glucagen Diagnostic Kit)  0 mg IM STAT PRN; Protocol


   PRN Reason: Hypoglycemia Protocol


Heparin Sodium (Porcine) (Heparin)  5,000 units SC Q12 [facility]


   Last Admin: 01/04/18 13:29 Dose:  Not Given


Dextrose (Dextrose 5% In Water 1000 Ml)  1,000 mls @ 0 mls/hr IV .Q0M PRN; 

Protocol; Per Protocol


   PRN Reason: Hypoglycemia Protocol


Vancomycin/Sodium Chloride (Vancomycin 1 Gm/Ns 200 Ml)  1 gm in 200 mls @ 166.7 

mls/hr IVPB Q24H [facility]


   Stop: 01/08/18 10:01


   Last Admin: 01/05/18 10:42 Dose:  166.7 mls/hr


Sodium Chloride (Sodium Chloride 0.9%)  1,000 mls @ 75 mls/hr IV .[id number] [facility]


   Last Admin: 01/05/18 00:51 Dose:  75 mls/hr


Piperacillin Sod/Tazobactam Sod (Zosyn 2.25 Gm Iv Premix)  2.25 gm in 50 mls @ 

100 mls/hr IVPB Q8H [facility]


   Last Admin: 01/05/18 08:32 Dose:  100 mls/hr


Insulin Glargine (Lantus)  9 unit SC [facility]


   Last Admin: 01/04/18 21:33 Dose:  Not Given


Insulin Human Regular (Novolin R)  0 unit SC [facility]


   PRN Reason: Protocol


   Last Admin: 01/05/18 12:34 Dose:  Not Given


Metoprolol Succinate (Toprol Xl)  50 mg PO DAILY [facility]


   Last Admin: 01/04/18 12:25 Dose:  Not Given


Metoprolol Tartrate (Lopressor)  5 mg IVP Q6H PRN


   PRN Reason: Systolic Blood Pressure


Omega-3-Acid Ethyl Esters (Lovaza)  1 gm PO BID [facility]


   Last Admin: 01/04/18 12:26 Dose:  Not Given


Ondansetron HCl (Zofran Inj)  4 mg IVP Q6H PRN


   PRN Reason: Nausea/Vomiting


   Last Admin: 01/04/18 03:42 Dose:  4 mg


Oxybutynin Chloride (Ditropan Xl)  5 mg PO DAILY [facility]


   Last Admin: 01/04/18 12:26 Dose:  Not Given


Pantoprazole Sodium (Protonix Inj)  40 mg IVP Q12H [facility]


   Last Admin: 01/05/18 12:45 Dose:  40 mg


Rivaroxaban (Xarelto)  20 mg PO DAILY [facility]


Rosuvastatin Calcium (Crestor)  10 mg PO [facility]


   Last Admin: 01/03/18 21:53 Dose:  10 mg


Saccharomyces Boulardii (Florastor)  250 mg PO BID [facility]


   Last Admin: 01/04/18 12:26 Dose:  Not Given


Sitagliptin Phosphate (Januvia)  50 mg PO DAILY [facility]


   Last Admin: 01/04/18 12:26 Dose:  Not Given











- Labs


Labs: 


 





 01/05/18 07:16 





 01/05/18 07:16 





 











PT  13.4 SECONDS (9.7-12.2)  H  01/04/18  13:46    


 


INR  1.2   01/04/18  13:46    














- Constitutional


Appears: Non-toxic, No Acute Distress





- Head Exam


Head Exam: ATRAUMATIC, NORMAL INSPECTION, NORMOCEPHALIC





- Eye Exam


Eye Exam: EOMI, Normal appearance, PERRL


Pupil Exam: PERRL





- ENT Exam


ENT Exam: Mucous Membranes Dry


Additional comments: 





NGT





- Neck Exam


Neck Exam: Full ROM





- Respiratory Exam


Respiratory Exam: Clear to Ausculation Bilateral





- Cardiovascular Exam


Cardiovascular Exam: Irregular Rhythm





- GI/Abdominal Exam


GI & Abdominal Exam: Soft, Hypoactive Bowel Sounds.  absent: Distended, Guarding

, Rigid, Tenderness, Organomegaly





- Rectal Exam


Rectal Exam: Deferred





- Back Exam


Back Exam: NORMAL INSPECTION





- Neurological Exam


Neurological Exam: Alert, Awake





- Psychiatric Exam


Psychiatric exam: Normal Affect, Normal Mood





- Skin


Skin Exam: Dry, Intact, Normal Color, Warm





Assessment and Plan





- Assessment and Plan (Free Text)


Assessment: 


This is a a 77yM presenting with fevers and weakness, found to UTI and 

bacteremia. 


1. Coffee ground emesis


2. SBO


3. Sepsis 


4. UTI


5. Atrial fibrillation on OAC

















Plan: 


-Continue supportive care 


- NPO


- Continue to monitor NGT output, follow up surgical recommendations


- Antibiotic therapy for UTI as per ID


- H/H stable, continue to monitor CBC in am 


- Continue with PPI daily


- No plan for endoscopic evaluation at this time, 


- Will continue to follow pt closely 














<Jordin Pate - Last Filed: 01/05/18 17:16>





Objective





- Vital Signs/Intake and Output


Vital Signs (last 24 hours): 


 











Temp Pulse Resp BP Pulse Ox


 


 97.9 F   101 H  20   169/80 H  93 L


 


 01/05/18 15:40  01/05/18 15:40  01/05/18 15:40  01/05/18 15:40  01/05/18 15:40








Intake and Output: 


 











 01/05/18 01/05/18





 06:59 18:59


 


Intake Total 1300 


 


Output Total 1850 


 


Balance -550 














- Medications


Medications: 


 Current Medications





Acetaminophen (Tylenol 325mg Tab)  650 mg PO Q6 PRN


   PRN Reason: Fever >100.4 F


   Last Admin: 01/04/18 00:17 Dose:  650 mg


Albuterol/Ipratropium (Duoneb 3 Mg/0.5 Mg (3 Ml) Ud)  3 ml INH RQ6 PRN


   PRN Reason: Cough


Amiodarone HCl (Cordarone)  200 mg PO DAILY [facility]


   Last Admin: 01/05/18 10:43 Dose:  Not Given


Amlodipine Besylate (Norvasc)  10 mg PO DAILY [facility]


   Last Admin: 01/04/18 12:26 Dose:  Not Given


Aspirin (Aspirin Chewable)  81 mg PO DAILY [facility]


   Last Admin: 01/04/18 12:27 Dose:  Not Given


Dextrose (Dextrose 50% Inj)  0 ml IV STAT PRN; Protocol


   PRN Reason: Hypoglycemia Protocol


Dextrose (Glutose 15)  0 gm PO ONCE PRN; Protocol


   PRN Reason: Hypoglycemia Protocol


Glucagon (Glucagen Diagnostic Kit)  0 mg IM STAT PRN; Protocol


   PRN Reason: Hypoglycemia Protocol


Heparin Sodium (Porcine) (Heparin)  5,000 units SC Q12 [facility]


   Last Admin: 01/04/18 13:29 Dose:  Not Given


Vancomycin/Sodium Chloride (Vancomycin 1 Gm/Ns 200 Ml)  1 gm in 200 mls @ 166.7 

mls/hr IVPB Q24H [facility]


   Stop: 01/08/18 10:01


   Last Admin: 01/05/18 10:42 Dose:  166.7 mls/hr


Sodium Chloride (Sodium Chloride 0.9%)  1,000 mls @ 75 mls/hr IV .[id number] [facility]


   Last Admin: 01/05/18 16:34 Dose:  75 mls/hr


Piperacillin Sod/Tazobactam Sod (Zosyn 2.25 Gm Iv Premix)  2.25 gm in 50 mls @ 

100 mls/hr IVPB Q8H [facility]


   Last Admin: 01/05/18 16:33 Dose:  100 mls/hr


Insulin Glargine (Lantus)  9 unit SC HS [facility]


   Last Admin: 01/04/18 21:33 Dose:  Not Given


Insulin Human Regular (Novolin R)  0 unit SC ACHS [facility]


   PRN Reason: Protocol


   Last Admin: 01/05/18 16:36 Dose:  Not Given


Metoprolol Succinate (Toprol Xl)  50 mg PO DAILY [facility]


   Last Admin: 01/04/18 12:25 Dose:  Not Given


Metoprolol Tartrate (Lopressor)  5 mg IVP Q6H PRN


   PRN Reason: Systolic Blood Pressure


Omega-3-Acid Ethyl Esters (Lovaza)  1 gm PO BID [facility]


   Last Admin: 01/04/18 12:26 Dose:  Not Given


Ondansetron HCl (Zofran Inj)  4 mg IVP Q6H PRN


   PRN Reason: Nausea/Vomiting


   Last Admin: 01/04/18 03:42 Dose:  4 mg


Oxybutynin Chloride (Ditropan Xl)  5 mg PO DAILY [facility]


   Last Admin: 01/04/18 12:26 Dose:  Not Given


Pantoprazole Sodium (Protonix Inj)  40 mg IVP Q12H [facility]


   Last Admin: 01/05/18 12:45 Dose:  40 mg


Rivaroxaban (Xarelto)  20 mg PO DAILY [facility]


Rosuvastatin Calcium (Crestor)  10 mg PO HS [facility]


   Last Admin: 01/03/18 21:53 Dose:  10 mg


Saccharomyces Boulardii (Florastor)  250 mg PO BID [facility]


   Last Admin: 01/04/18 12:26 Dose:  Not Given


Sitagliptin Phosphate (Januvia)  50 mg PO DAILY [facility]


   Last Admin: 01/04/18 12:26 Dose:  Not Given











- Labs


Labs: 


 





 01/05/18 07:16 





 01/05/18 07:16 





 











PT  13.4 SECONDS (9.7-12.2)  H  01/04/18  13:46    


 


INR  1.2   01/04/18  13:46    














Attending/Attestation





- Attestation


I have personally seen and examined this patient.: Yes


I have fully participated in the care of the patient.: Yes


I have reviewed all pertinent clinical information, including history, physical 

exam and plan: Yes


Notes (Text): 





01/05/18 17:15


77 year old male with h/o cholecystectomy admitted with UTI, bacteremia, found 

to hae small bowel obstruction.  


1. Small bowel obstruction


Plan: 


- NGT decompression, IV hydration and electrolyte replacement


- uncertain etiology


- appreciate surgical eval


- obstruction appears to be proximal small bowel


- continue PPI

## 2018-01-05 NOTE — CP.PCM.PN
Subjective





- Date & Time of Evaluation


Date of Evaluation: 01/05/18


Time of Evaluation: 12:14





- Subjective


Subjective: 


soft non distended abdomen. no findings to suggest sbo. wiil start ral fluids








Objective





- Vital Signs/Intake and Output


Vital Signs (last 24 hours): 


 











Temp Pulse Resp BP Pulse Ox


 


 98.6 F   80   20   145/77   95 


 


 01/05/18 08:39  01/05/18 08:39  01/05/18 08:39  01/05/18 08:39  01/05/18 08:39








Intake and Output: 


 











 01/05/18 01/05/18





 06:59 18:59


 


Intake Total 1300 


 


Output Total 1850 


 


Balance -550 














- Medications


Medications: 


 Current Medications





Acetaminophen (Tylenol 325mg Tab)  650 mg PO Q6 PRN


   PRN Reason: Fever >100.4 F


   Last Admin: 01/04/18 00:17 Dose:  650 mg


Albuterol/Ipratropium (Duoneb 3 Mg/0.5 Mg (3 Ml) Ud)  3 ml INH RQ6 PRN


   PRN Reason: Cough


Amiodarone HCl (Cordarone)  200 mg PO DAILY [facility]


   Last Admin: 01/05/18 10:43 Dose:  Not Given


Amlodipine Besylate (Norvasc)  10 mg PO DAILY [facility]


   Last Admin: 01/04/18 12:26 Dose:  Not Given


Aspirin (Aspirin Chewable)  81 mg PO DAILY [facility]


   Last Admin: 01/04/18 12:27 Dose:  Not Given


Dextrose (Dextrose 50% Inj)  0 ml IV STAT PRN; Protocol


   PRN Reason: Hypoglycemia Protocol


Dextrose (Glutose 15)  0 gm PO ONCE PRN; Protocol


   PRN Reason: Hypoglycemia Protocol


Glucagon (Glucagen Diagnostic Kit)  0 mg IM STAT PRN; Protocol


   PRN Reason: Hypoglycemia Protocol


Heparin Sodium (Porcine) (Heparin)  5,000 units SC Q12 [facility]


   Last Admin: 01/04/18 13:29 Dose:  Not Given


Dextrose (Dextrose 5% In Water 1000 Ml)  1,000 mls @ 0 mls/hr IV .Q0M PRN; 

Protocol; Per Protocol


   PRN Reason: Hypoglycemia Protocol


Vancomycin/Sodium Chloride (Vancomycin 1 Gm/Ns 200 Ml)  1 gm in 200 mls @ 166.7 

mls/hr IVPB Q24H [facility]


   Stop: 01/08/18 10:01


   Last Admin: 01/05/18 10:42 Dose:  166.7 mls/hr


Sodium Chloride (Sodium Chloride 0.9%)  1,000 mls @ 75 mls/hr IV .[id number] [facility]


   Last Admin: 01/05/18 00:51 Dose:  75 mls/hr


Piperacillin Sod/Tazobactam Sod (Zosyn 2.25 Gm Iv Premix)  2.25 gm in 50 mls @ 

100 mls/hr IVPB Q8H [facility]


   Last Admin: 01/05/18 08:32 Dose:  100 mls/hr


Insulin Glargine (Lantus)  9 unit SC [facility]


   Last Admin: 01/04/18 21:33 Dose:  Not Given


Insulin Human Regular (Novolin R)  0 unit SC ACHS [facility]


   PRN Reason: Protocol


   Last Admin: 01/05/18 08:13 Dose:  Not Given


Metoprolol Succinate (Toprol Xl)  50 mg PO DAILY [facility]


   Last Admin: 01/04/18 12:25 Dose:  Not Given


Metoprolol Tartrate (Lopressor)  5 mg IVP Q6H PRN


   PRN Reason: Systolic Blood Pressure


Omega-3-Acid Ethyl Esters (Lovaza)  1 gm PO BID [facility]


   Last Admin: 01/04/18 12:26 Dose:  Not Given


Ondansetron HCl (Zofran Inj)  4 mg IVP Q6H PRN


   PRN Reason: Nausea/Vomiting


   Last Admin: 01/04/18 03:42 Dose:  4 mg


Oxybutynin Chloride (Ditropan Xl)  5 mg PO DAILY [facility]


   Last Admin: 01/04/18 12:26 Dose:  Not Given


Pantoprazole Sodium (Protonix Inj)  40 mg IVP Q12H [facility]


   Last Admin: 01/05/18 00:45 Dose:  40 mg


Rivaroxaban (Xarelto)  20 mg PO DAILY [facility]


Rosuvastatin Calcium (Crestor)  10 mg PO HS [facility]


   Last Admin: 01/03/18 21:53 Dose:  10 mg


Saccharomyces Boulardii (Florastor)  250 mg PO BID [facility]


   Last Admin: 01/04/18 12:26 Dose:  Not Given


Sitagliptin Phosphate (Januvia)  50 mg PO DAILY [facility]


   Last Admin: 01/04/18 12:26 Dose:  Not Given











- Labs


Labs: 


 





 01/05/18 07:16 





 01/05/18 07:16 





 











PT  13.4 SECONDS (9.7-12.2)  H  01/04/18  13:46    


 


INR  1.2   01/04/18  13:46

## 2018-01-05 NOTE — CP.PCM.PN
Subjective





- Date & Time of Evaluation


Date of Evaluation: 01/05/18


Time of Evaluation: 04:00





- Subjective


Subjective: 





dictated





Objective





- Vital Signs/Intake and Output


Vital Signs (last 24 hours): 


 











Temp Pulse Resp BP Pulse Ox


 


 97.9 F   96 H  20   169/80 H  93 L


 


 01/05/18 15:40  01/05/18 17:00  01/05/18 15:40  01/05/18 15:40  01/05/18 15:40








Intake and Output: 


 











 01/05/18 01/05/18





 06:59 18:59


 


Intake Total 1300 600


 


Output Total 1850 1220


 


Balance -550 -620














- Medications


Medications: 


 Current Medications





Acetaminophen (Tylenol 325mg Tab)  650 mg PO Q6 PRN


   PRN Reason: Fever >100.4 F


   Last Admin: 01/04/18 00:17 Dose:  650 mg


Albuterol/Ipratropium (Duoneb 3 Mg/0.5 Mg (3 Ml) Ud)  3 ml INH RQ6 PRN


   PRN Reason: Cough


Amiodarone HCl (Cordarone)  200 mg PO DAILY [facility]


   Last Admin: 01/05/18 10:43 Dose:  Not Given


Amlodipine Besylate (Norvasc)  10 mg PO DAILY [facility]


   Last Admin: 01/04/18 12:26 Dose:  Not Given


Aspirin (Aspirin Chewable)  81 mg PO DAILY [facility]


   Last Admin: 01/04/18 12:27 Dose:  Not Given


Dextrose (Dextrose 50% Inj)  0 ml IV STAT PRN; Protocol


   PRN Reason: Hypoglycemia Protocol


Dextrose (Glutose 15)  0 gm PO ONCE PRN; Protocol


   PRN Reason: Hypoglycemia Protocol


Glucagon (Glucagen Diagnostic Kit)  0 mg IM STAT PRN; Protocol


   PRN Reason: Hypoglycemia Protocol


Heparin Sodium (Porcine) (Heparin)  5,000 units SC Q12 [facility]


   Last Admin: 01/04/18 13:29 Dose:  Not Given


Vancomycin/Sodium Chloride (Vancomycin 1 Gm/Ns 200 Ml)  1 gm in 200 mls @ 166.7 

mls/hr IVPB Q24H [facility]


   Stop: 01/08/18 10:01


   Last Admin: 01/05/18 10:42 Dose:  166.7 mls/hr


Sodium Chloride (Sodium Chloride 0.9%)  1,000 mls @ 75 mls/hr IV .[id number] [facility]


   Last Admin: 01/05/18 16:34 Dose:  75 mls/hr


Piperacillin Sod/Tazobactam Sod (Zosyn 2.25 Gm Iv Premix)  2.25 gm in 50 mls @ 

100 mls/hr IVPB Q8H [facility]


   Last Admin: 01/05/18 16:33 Dose:  100 mls/hr


Insulin Glargine (Lantus)  9 unit SC HS [facility]


   Last Admin: 01/04/18 21:33 Dose:  Not Given


Insulin Human Regular (Novolin R)  0 unit SC ACHS [facility]


   PRN Reason: Protocol


   Last Admin: 01/05/18 16:36 Dose:  Not Given


Metoprolol Succinate (Toprol Xl)  50 mg PO DAILY [facility]


   Last Admin: 01/04/18 12:25 Dose:  Not Given


Metoprolol Tartrate (Lopressor)  5 mg IVP Q6H PRN


   PRN Reason: Systolic Blood Pressure


Omega-3-Acid Ethyl Esters (Lovaza)  1 gm PO BID [facility]


   Last Admin: 01/04/18 12:26 Dose:  Not Given


Ondansetron HCl (Zofran Inj)  4 mg IVP Q6H PRN


   PRN Reason: Nausea/Vomiting


   Last Admin: 01/04/18 03:42 Dose:  4 mg


Oxybutynin Chloride (Ditropan Xl)  5 mg PO DAILY [facility]


   Last Admin: 01/04/18 12:26 Dose:  Not Given


Pantoprazole Sodium (Protonix Inj)  40 mg IVP Q12H [facility]


   Last Admin: 01/05/18 12:45 Dose:  40 mg


Rivaroxaban (Xarelto)  20 mg PO DAILY [facility]


Rosuvastatin Calcium (Crestor)  10 mg PO HS [facility]


   Last Admin: 01/03/18 21:53 Dose:  10 mg


Saccharomyces Boulardii (Florastor)  250 mg PO BID [facility]


   Last Admin: 01/04/18 12:26 Dose:  Not Given


Sitagliptin Phosphate (Januvia)  50 mg PO DAILY [facility]


   Last Admin: 01/04/18 12:26 Dose:  Not Given











- Labs


Labs: 


 





 01/05/18 07:16 





 01/05/18 07:16 





 











PT  13.4 SECONDS (9.7-12.2)  H  01/04/18  13:46    


 


INR  1.2   01/04/18  13:46

## 2018-01-05 NOTE — PN
DATE:



SUBJECTIVE:  The patient was lying in bed with an NG tube.  He has left

hemiparesis.



PHYSICAL EXAMINATION

VITAL SIGNS:  His temperature was 97.6, heart rate of 101, blood pressure

169/80, respirations are 20 and saturation 93%.  NG tube was connected to

suction.

NECK:  Supple.

LUNGS:  Had decreased breath sounds bilaterally, occasional wheeze.

HEART:  S1 and S2 is regular.

ABDOMEN:  Soft, distended, bowel sounds are sluggish.

EXTREMITIES:  Have left hemiparesis.



LABORATORY DATA:  Noted.  Labs show white count is 10.4, hemoglobin 13,

hematocrit 38.8, platelet count is 176.  His sodium is 144, potassium 3.5,

chloride is 109, CO2 is 27, BUN is 18, creatinine is 1.2, calcium was 8.4

and albumin is 3.3.  He had an x-ray done today and x-ray shows the left

lower lobe infiltrate and he also had abdominal CT and pelvic CT on 01/03,

which shows mild proximal small bowel thickening, possibility of enteritis,

bowel loops appear within normal limits.  Finding discussed with resident

on 01/04 and it showed some small bowel ileus, I think.  A 11.1 x 1 complex

renal cyst, nonobstructing left renal calculus.  He did have a chest x-ray.

So the patient has been on antibiotics.  His micro came back as urine

culture showing Enterobacter aerogenes, which was sensitive to Cipro,

ertapenem and sensitive to Zosyn and Bactrim, but it is more sensitive to

cefepime and his blood culture one set was Streptococcus or Staph aureus,

coagulase negative and finally it came out Strep mitis  or

Coagulase-negative Staph that could be but the repeat cultures were

negative.  The sputum is now final, is pending.  Sputum final on 01/03

shows oropharyngeal contaminants - not helpful.



PLAN:  So, at this time, we will see his labs.  We will continue present

treatment.  Keep the potassium on the higher level as the patient has some

ileus and he is n.p.o.  We will follow.





__________________________________________

Petey Quinteros MD



DD:  01/05/2018 18:07:14

DT:  01/05/2018 20:10:38

Job # [id number]

## 2018-01-05 NOTE — CP.[id number].PN
Addendum entered and electronically signed by Edna Del Rio DO  18 15:07: 





 CXR (18):


   Suspect left lower lobe pneumonia. Follow-up PA and lateral radiographs are 

recommended for definitive evaluation.


   Patient currently on Vanc and Zosyn. Monitor WBC, temp. Consider repeat CXR 

tomorrow. 





Original Note:








<Edna Del Rio - Last Filed: 18 14:54>





Subjective





- Date & Time of Evaluation


Date of Evaluation: 18


Time of Evaluation: 09:00





- Subjective


Subjective: 


Patient seen and examined at bedside. At this time he denies n/v/d. Denies 

fevers, chills, headaches, dizziness, cp, palpitations, sob, abdominal pain, 

urinary symptoms, changes in bowel habits. States that he just feels tired


 








Objective





- Vital Signs/Intake and Output


Vital Signs (last 24 hours): 


 











Temp Pulse Resp BP Pulse Ox


 


 98.6 F   80   20   145/77   95 


 


 18 08:39  18 08:39  18 08:39  18 08:39  18 08:39








Intake and Output: 


 











 18





 06:59 18:59


 


Intake Total 1300 


 


Output Total 1850 


 


Balance -550 














- Medications


Medications: 


 Current Medications





Acetaminophen (Tylenol 325mg Tab)  650 mg PO Q6 PRN


   PRN Reason: Fever >100.4 F


   Last Admin: 18 00:17 Dose:  650 mg


Albuterol/Ipratropium (Duoneb 3 Mg/0.5 Mg (3 Ml) Ud)  3 ml INH RQ6 PRN


   PRN Reason: Cough


Amiodarone HCl (Cordarone)  200 mg PO DAILY [facility]


   Last Admin: 18 10:43 Dose:  Not Given


Amlodipine Besylate (Norvasc)  10 mg PO DAILY [facility]


   Last Admin: 18 12:26 Dose:  Not Given


Aspirin (Aspirin Chewable)  81 mg PO DAILY [facility]


   Last Admin: 18 12:27 Dose:  Not Given


Dextrose (Dextrose 50% Inj)  0 ml IV STAT PRN; Protocol


   PRN Reason: Hypoglycemia Protocol


Dextrose (Glutose 15)  0 gm PO ONCE PRN; Protocol


   PRN Reason: Hypoglycemia Protocol


Glucagon (Glucagen Diagnostic Kit)  0 mg IM STAT PRN; Protocol


   PRN Reason: Hypoglycemia Protocol


Heparin Sodium (Porcine) (Heparin)  5,000 units SC Q12 [facility]


   Last Admin: 18 13:29 Dose:  Not Given


Dextrose (Dextrose 5% In Water 1000 Ml)  1,000 mls @ 0 mls/hr IV .Q0M PRN; 

Protocol; Per Protocol


   PRN Reason: Hypoglycemia Protocol


Vancomycin/Sodium Chloride (Vancomycin 1 Gm/Ns 200 Ml)  1 gm in 200 mls @ 166.7 

mls/hr IVPB Q24H [facility]


   Stop: 18 10:01


   Last Admin: 18 10:42 Dose:  166.7 mls/hr


Sodium Chloride (Sodium Chloride 0.9%)  1,000 mls @ 75 mls/hr IV .[id number] [facility]


   Last Admin: 18 00:51 Dose:  75 mls/hr


Piperacillin Sod/Tazobactam Sod (Zosyn 2.25 Gm Iv Premix)  2.25 gm in 50 mls @ 

100 mls/hr IVPB Q8H [facility]


   Last Admin: 18 08:32 Dose:  100 mls/hr


Insulin Glargine (Lantus)  9 unit SC HS [facility]


   Last Admin: 18 21:33 Dose:  Not Given


Insulin Human Regular (Novolin R)  0 unit SC ACHS [facility]


   PRN Reason: Protocol


   Last Admin: 18 12:34 Dose:  Not Given


Metoprolol Succinate (Toprol Xl)  50 mg PO DAILY [facility]


   Last Admin: 18 12:25 Dose:  Not Given


Metoprolol Tartrate (Lopressor)  5 mg IVP Q6H PRN


   PRN Reason: Systolic Blood Pressure


Omega-3-Acid Ethyl Esters (Lovaza)  1 gm PO BID [facility]


   Last Admin: 18 12:26 Dose:  Not Given


Ondansetron HCl (Zofran Inj)  4 mg IVP Q6H PRN


   PRN Reason: Nausea/Vomiting


   Last Admin: 18 03:42 Dose:  4 mg


Oxybutynin Chloride (Ditropan Xl)  5 mg PO DAILY [facility]


   Last Admin: 18 12:26 Dose:  Not Given


Pantoprazole Sodium (Protonix Inj)  40 mg IVP Q12H [facility]


   Last Admin: 18 12:45 Dose:  40 mg


Rivaroxaban (Xarelto)  20 mg PO DAILY [facility]


Rosuvastatin Calcium (Crestor)  10 mg PO HS [facility]


   Last Admin: 18 21:53 Dose:  10 mg


Saccharomyces Boulardii (Florastor)  250 mg PO BID [facility]


   Last Admin: 18 12:26 Dose:  Not Given


Sitagliptin Phosphate (Januvia)  50 mg PO DAILY [facility]


   Last Admin: 18 12:26 Dose:  Not Given











- Labs


Labs: 


 





 18 07:16 





 18 07:16 





 











PT  13.4 SECONDS (9.7-12.2)  H  18  13:46    


 


INR  1.2   18  13:46    














- Constitutional


Appears: No Acute Distress





- Head Exam


Head Exam: ATRAUMATIC, NORMAL INSPECTION, NORMOCEPHALIC





- Eye Exam


Eye Exam: EOMI, Normal appearance





- ENT Exam


ENT Exam: Mucous Membranes Moist


Additional comments: 


NG Tube











- Respiratory Exam


Respiratory Exam: Rales (Left Lower Lung Base).  absent: Clear to Ausculation 

Bilateral, Rhonchi, Wheezes





- Cardiovascular Exam


Cardiovascular Exam: Irregular Rhythm, +S1, +S2.  absent: REGULAR RHYTHM





- GI/Abdominal Exam


GI & Abdominal Exam: Soft, Normal Bowel Sounds.  absent: Bruit, Distended, Firm

, Guarding, Rigid, Tenderness





- Extremities Exam


Extremities Exam: absent: Pedal Edema





- Neurological Exam


Neurological Exam: Alert, Awake, Oriented x3


Neuro motor strength exam: Left Upper Extremity: 0, Right Upper Extremity: 5, 

Left Lower Extremity: 0, Right Lower Extremity: 5





- Psychiatric Exam


Psychiatric exam: Normal Affect, Normal Mood





- Skin


Skin Exam: Dry, Intact, Normal Color, Warm





Assessment and Plan





- Assessment and Plan (Free Text)


Plan: 


(1) Coffee ground emesis, abdominal distention


Assessment and Plan:


* Patient had moderately distended abdomen with episode of coffee ground emesis 

this morning


* CT abd/pelvis: distended stomach, mild proximal small bowel wall thickening, 

correlate clinically for possible enteritis (per discussion with Radiologist)


* General Surgery Consulted, Dr Kulkarni, help appreciated


* NGT was placed, approx 550cc of coffee ground emesis in cannister


* GI consulted, Dr Khoury, help appreciated


* Continue Protonix 40mg IVP


* Diet NPO


* Heparin SC held


* CBC, T+S, INR ordered STAT


* Vital signs are stable


* NS @ 75cc/hr


* Continue antibiotics





(2) Urosepsis, Urinary tract infection, Bacteremia


Assessment and Plan: 


* Code Sepsis 18: 11:40 AM in ED. Criteria - elevated WBC, elevated HR, 

Fever, elevated Lactate. In the ED -> Aztreonam, Vancomycin, NS bolus.


* Patient not given fluid as 30 mg/kg given concern for congestive heart 

failure.


* Patient has been afebrile > 24 hours 


* Leukocytosis 11.9 today, improving 


* Antibiotics: Zosyn 2.25 mg IVP Q8H, Vancomycin 1g IVP Q24H, Florastor 250mg 

PO BID


* Tylenol 650mg PO Q6H PRN for fever


* NS @ 75 cc/hr


* Urine cx growing enterobacter aerogenes


 * Sensitivities reviewed, will discuss with ID if abx will need to be changed


* First Blood cx growing gram positive cocci (may be contaminant), second blood 

cx showed no growth


* Blood cultures have been repeated


* Procal 0.80, Lactic acid normalized


* Throat culture showing no growth, Flu negative, urine legionella negative, 


* F/u repeat sputum culture


* Duonebs PRN shortness of breathe


* Oxygen 3 Liter for nasal cannula


* Infectious Disease on consult, Dr Quinteros, help appreciated 


* Imaging:


 * CT chest w/o contrast: No consolidative infiltrate suggested, Mild bibasilar 

peripheral subplerual septal lines noted, Bilateral renal masses, There are at 

least 2 right renal masses that appear hypodense (please see full report)


 * Bladder US: Renal cysts; right kidney 11.3x7.6x10.2cm cyst, 1.3x1.3.1.2cm 

cyst; left kidney 1.3x.10.0.9cm cyst 


 * CXR: No consolidation, cardiomegaly with limited visualization of the left 

costophrenic angle (see full report)


 * CT abd/pelvis: 11.1 x 10.2 cm complex right cystic lesion with evidence of 

calcification and septation. 7mm right upper pole echogenic lesion, 

indeterminate. 17mm low-density, left upper pole kidney 


Status: Acute   Priority: High   





(3) History of Atrial fibrillation


Assessment and Plan: 


* Hx of Pacemaker (or AICD)


* CHADS score 5 -> 12.5% risk of event per yr w/o AO; HASBLED 4 -> Patient at 

high risk for major bleeding


* Aspirin 81mg PO QDaily- on hold at this time 


* Metoprolol Succinate 50mg PO QDaily - on hold at this time 


* Increased Amiodarone 200mg PO QDaily - on hold at this time


* TSH and Free T4 within normal limits


* Patient sees Dr Astudillo outpatient


* Per discussion with Dr Dent, patient is supposed to be on Xarelto 20mg PO 

daily


* Cardiology on consult, Dr Chapman, help appreciated


Status: Chronic   Priority: High   





(4) Cardiomegaly


Assessment and Plan: 


* Echo from 2015 showed normal EF and some possible diastolic dysfunction


* Repeat Echo completed, f/u official report


* Per cardio, patient has preserved LVEF with mild mitral regurgitation


* Gentle IV hydration


Status: Acute   Priority: High   





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


Assessment and Plan: 


* Aspirin 81mg PO QD - on hold at this time


* Crestor 10 mg PO HS - on hold at this time


* Blood pressure control


Status: Chronic   Priority: Medium   





(6) Diabetes mellitus


Assessment and Plan: 


* Lantus 18 units HS


* ISS medium dose, Accuchecks ACHS


* Consistent carb diet


* HgbA1C 8.1


* Januvia 50mg PO QDaily - on hold at this tme


* HOLD metformin 1000mg PO BID 2/2 mild elevation in lactate


* Hypoglycemia protocol


Status: Chronic   Priority: Medium   





(7) HTN (hypertension)


Assessment and Plan: 


* BP well controlled


* Metoprolol succinate 50mg PO QDaily - on hold at this time


* Norvasc 10mg PO QDaily - on hold at this time


* Lopressor 5mg IVP Q6H prn SBP > 160


Status: Chronic   Priority: High   





(8) Lipid disorder


Assessment and Plan: 


* Lipid panel within normal limits


* Lovaza 1g PO BID - on hold at this time


* Crestor 10 mg PO HS - on hold at this time


Status: Chronic 





(9) Abnormal urinalysis


Assessment and Plan: 


* History of Urinary Incontinence


* Straight cath in ED with 200ml of normal appearing urine given he appeared he 

had difficulty urinating in urinal


* Bladder scan PRN


* Urine culture growing enterobacter aerogenes, sensitivities reviewed


* Contiue Oxybutynin XL 5mg PO QDaily - on hold 


* CT chest comments on renal masses, Bladder US showed renal cysts


* Per PMD, in 2011 patient had right renal cyst that measured 9cm


* Urology on consult, mirella Zendejas appreciated 


Status: Acute   





(10) Prophylactic measure


Assessment and Plan: 


* Protonix 40mg IVP daily


* SCDs


* Heparin 5000u SC Q12H discontinued 


* Florastor 250mg PO BID - on hold at this time 


* PT/OT eval and treat - PT recommending KITTY for disposition 


* 





Patient seen and discussed with Attending


Edna Del Rio - PGY1














<Juliann Bird - Last Filed: 18 08:30>





Objective





- Vital Signs/Intake and Output


Vital Signs (last 24 hours): 


 











Temp Pulse Resp BP Pulse Ox


 


 97.7 F   89   20   155/87 H  97 


 


 18 00:00  18 00:00  18 00:00  18 00:00  18 00:00








Intake and Output: 


 











 18





 06:59 18:59


 


Intake Total 800 


 


Output Total 1030 


 


Balance -230 














- Medications


Medications: 


 Current Medications





Acetaminophen (Tylenol 325mg Tab)  650 mg PO Q6 PRN


   PRN Reason: Fever >100.4 F


   Last Admin: 18 00:17 Dose:  650 mg


Albuterol/Ipratropium (Duoneb 3 Mg/0.5 Mg (3 Ml) Ud)  3 ml INH RQ6 PRN


   PRN Reason: Cough


Amiodarone HCl (Cordarone)  200 mg PO DAILY [facility]


   Last Admin: 18 10:43 Dose:  Not Given


Amlodipine Besylate (Norvasc)  10 mg PO DAILY [facility]


   Last Admin: 18 12:26 Dose:  Not Given


Aspirin (Aspirin Chewable)  81 mg PO DAILY [facility]


   Last Admin: 18 12:27 Dose:  Not Given


Dextrose (Dextrose 50% Inj)  0 ml IV STAT PRN; Protocol


   PRN Reason: Hypoglycemia Protocol


Dextrose (Glutose 15)  0 gm PO ONCE PRN; Protocol


   PRN Reason: Hypoglycemia Protocol


Glucagon (Glucagen Diagnostic Kit)  0 mg IM STAT PRN; Protocol


   PRN Reason: Hypoglycemia Protocol


Heparin Sodium (Porcine) (Heparin)  5,000 units SC Q12 [facility]


   Last Admin: 18 13:29 Dose:  Not Given


Vancomycin/Sodium Chloride (Vancomycin 1 Gm/Ns 200 Ml)  1 gm in 200 mls @ 166.7 

mls/hr IVPB Q24H [facility]


   Stop: 18 10:01


   Last Admin: 18 10:42 Dose:  166.7 mls/hr


Sodium Chloride (Sodium Chloride 0.9%)  1,000 mls @ 75 mls/hr IV .[id number] [facility]


   Last Admin: 18 16:34 Dose:  75 mls/hr


Cefepime HCl (Maxipime Iv 1 Gm Premix)  1 gm in 50 mls @ 100 mls/hr IVPB Q12H 

[facility]


   Last Admin: 18 06:18 Dose:  100 mls/hr


Insulin Glargine (Lantus)  9 unit SC [facility]


   Last Admin: 18 21:46 Dose:  Not Given


Insulin Human Regular (Novolin R)  0 unit SC [facility]


   PRN Reason: Protocol


   Last Admin: 18 21:46 Dose:  Not Given


Metoprolol Succinate (Toprol Xl)  50 mg PO DAILY [facility]


   Last Admin: 18 12:25 Dose:  Not Given


Metoprolol Tartrate (Lopressor)  5 mg IVP Q6H PRN


   PRN Reason: Systolic Blood Pressure


Omega-3-Acid Ethyl Esters (Lovaza)  1 gm PO BID [facility]


   Last Admin: 18 12:26 Dose:  Not Given


Ondansetron HCl (Zofran Inj)  4 mg IVP Q6H PRN


   PRN Reason: Nausea/Vomiting


   Last Admin: 18 03:42 Dose:  4 mg


Oxybutynin Chloride (Ditropan Xl)  5 mg PO DAILY [facility]


   Last Admin: 18 12:26 Dose:  Not Given


Pantoprazole Sodium (Protonix Inj)  40 mg IVP Q12H [facility]


   Last Admin: 18 01:53 Dose:  40 mg


Rivaroxaban (Xarelto)  20 mg PO DAILY [facility]


Rosuvastatin Calcium (Crestor)  10 mg PO [facility]


   Last Admin: 18 21:53 Dose:  10 mg


Saccharomyces Boulardii (Florastor)  250 mg PO BID [facility]


   Last Admin: 18 12:26 Dose:  Not Given


Sitagliptin Phosphate (Januvia)  50 mg PO DAILY [facility]


   Last Admin: 18 12:26 Dose:  Not Given











- Labs


Labs: 


 





 18 07:20 





 18 07:20 





 











PT  13.4 SECONDS (9.7-12.2)  H  18  13:46    


 


INR  1.2   18  13:46    














Attending/Attestation





- Attestation


I have personally seen and examined this patient.: Yes


I have fully participated in the care of the patient.: Yes


I have reviewed all pertinent clinical information, including history, physical 

exam and plan: Yes


Notes (Text): 


This is a late computer entry for 2018.


Patient seen at bedside with his wife this morning. Patient continues to have 

NG tube. On physical exam patient does appears have rales over the left lower 

lung field however the abdomen appears soft and nondistended since placement of 

the NG tube yesterday. Patient is morning to flatus and has not had a bowel 

movement yet. Patient reevaluated in the afternoon reports mild flatus. His 

hemoglobin remained stable in the . It appears less likely the prior 

gastric output was coffee-ground. Will follow up with surgery C1 NG tube can be 

removed and went to restart by mouth medications to help control patient's 

atrial fibrillation. Patient's was seen and evaluated by infectious diseases 

later in the evening. Patient has repeat urine cultures. Patient's antibiotic 

was updated to cefepime later in the evening by ID. Patient's white count has 

resolved and patient's afebrile since admission. Repeat chest x-ray today does 

show left lower pneumonia.





Assessment/Plan


(1) Sepsis


Assessment and Plan: 


* Suspecting:urinary tract infection, ossible bacteremia, pneumonia


* Code Sepsis: 11:40 AM on 18 in ED. Criteria - elevated WBC, elevated HR, 

Fever, elevated Lactate. In the ED -> Aztreonam, Vancomycin, NS bolus.


* Patient not given fluid as 30 mg/kg given concern for congestive heart 

failure.


* Infectious Disease consult, Dr Quinteros on board


 * D/c Zosyn and Vanco; switch to Cefepime


 * Repeat UA and urine cultures 18


* Pending official read of echocardiogram


* Flu NEGATIVE


* Blood culture (18 11:00): Strepococcus Mitis and Coag Neg Staph


* Blood culture (18 10:45): no growth after3 days


* Blood culture (1/3/18): No growth after 48 hours 2


* Urine culture (18): Enterobacter Aerogenes-->HANNAH for Cefepime, Invanz, 

and Ciprofloxocin


* Negative for strep throat


* Sputum (1/3/17): contaminated


* F/U strep pnumoniae Ag, mycoplasma IgM, legionella Ag: negative


* Procalcitonin: 0.80


 Imaging:


 * CT chest w/o contrast (18): no consolidative infiltrate suggested, mild 

bibasilar peripheral subpleural septal lines. b/l renal masses. no gross 

hydronephrosis appreciated. few small left parapelvic renal cyst possible, mild 

perirenal stranding inflammatory changes present


 * Will order for abdomen/pelvis  given possible pyleonephritis


* Duonebs PRN shortness of breathe


* Oxygen 3 Liter for nasal cannula





Meds:


Tylenol 650mg PO Q6H PRN for fever





Status: Acute   Priority: High   





(2) Small Bowel Obstruction


       Enteritis


       Possible coffee ground emesis


Assessment and Plan: 


* General surgery (Dr. Kulkarni) on board-->help appreciated


* GI (Dr. Medina) on board-->help appreciated


* NGT placed 18 550 abdominal contents question of coffee ground emesis 

removed. Abdomen exam improved.


* CT Abdomen/Pelvis (w/o contrast): 11.11 X 10.2cm complex right cystic lesion 

with calcification and septation. nonobstructing left renal calculus. 

Distension of the stomach. Mild proximal small bowel wall thickening. Blood 

loop appear within normal limits of caliber


* Aspirin, Xarelto held in light of possible coffee ground emesis 18


* Medications switched to IV if possible


* Patient at time of exam has some flatus or bowel movements. We'll continue to 

monitor.


Status: Acute   Priority: High 





(3) History of Atrial fibrillation


Assessment and Plan: 


* Hx of Pacemaker (or AICD)


* Cardiology consult, Dr Chapman


* CHADS 5 -> 12.5% risk of event per yr w/o AO; HASBLED 4 -> Patient at high 

risk for major bleeding


* HOLD Aspirin 81mg PO QDaily-->possible coffee ground emesis


* HOLD Metoprolol Succinate 50mg PO QDaily secondary to vomitting/SBO-->

switched to Lopressor 5mg IV Q 6H given if SBP>160


* Hold Increased Amiodarone 200mg PO QDaily  secondary to vomitting/SBO


* TSH/Free T4


* Resident has spoken with patient's family physician: chemical anticoagulation 

had be discontinued secondary to falls on 18


Status: Chronic   Priority: High   





(4) Cardiomegaly


Assessment and Plan: 


* Echo from  showed normal EF and some possible diastolic dysfunction


* F/U Echo--Pending official read; cardiology on call noted normal V function, 

and mild MR


* Gentle IV hydration


* CT chest w/o contrast (18): no consolidative infiltrate suggested, mild 

bibasilar peripheral subpleural septal lines. b/l renal masses. no gross 

hydronephrosis appreciated. few small left parapelvic renal cyst possible, mild 

perirenal stranding inflammatory changes present


Status: Acute   Priority: High   





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


Assessment and Plan: 


* HOLD Aspirin 81mg PO QDaily-->possible coffee ground emesis


* hold Crestor 10 mg PO HS (Patient home Lipitor 20mg not carried in house) 

secondary to vomitting/SBO


* Blood pressure control with Lopressor IV PRN


* PT and OT on board


Status: Chronic   Priority: Medium   





(5) Diabetes mellitus


Assessment and Plan: 


* Accuchecks ACHS


* RISS medium dose


* Consistent carb diet


* HgbA1C: 8.1


* hold Glimepiride 4mg PO QDaily secondary to vomitting/SBO


* hold Januvia 100mg PO QDaily secondary to vomitting/SBO


* HOLD metformin 1000mg PO BID 2/2 mild elevation in lactate


* Lipid panel: T, Chol: 83, LDL<30, HDL: 27


* Hypoglycemic protocol


* Switch to Accuchecks Q6H


* Lower Lantus 9 units (prior 18 units) subqHS to prevent hypoglycemia


Status: Chronic   Priority: Medium   





(6) HTN (hypertension)


Assessment and Plan: 


* Lopressor 5mg IV Q 6H PRN SBP>160


* Hold Metoprolol succinate 50mg PO QDaily secondary to vomitting/SBO


* hold Norvasc 10mg PO QDaily secondary to vomitting/SBO


* HOLD home benazepril 40mg 2/2 to elevated potassium


Status: Chronic   Priority: High   





(7) Lipid disorder


Assessment and Plan: 


* Lipid panel: T, Chol: 83, LDL<30, HDL: 27


* hold Lovaza 1g PO BID secondary to vomitting/SBO


* hold Crestor 10 mg PO HS (Patient home Lipitor 20mg not carried in house) 

secondary to vomitting/SBO


Status: Chronic 





(8) Urinary Tract Infection


       History of Renal Cyst


Assessment and Plan: 


* History of Urinary Incontinence


* Straight cath in ED with 200ml of normal appearing urine given he appeared he 

had difficulty urinating in urinal


* Hold :Oxybutynin XL 5mg PO QDaily secondary to vomitting/SBO


* CT Abdomen/Pelvis (w/o contrast): 11.11 X 10.2cm complex right cystic lesion 

with calcification and septation. nonobstructing left renal calculus. 

Distension of the stomach. Mild proximal small bowel wall thickening. Blood 

loop appear within normal limits of caliber


* Renal US: renal cysts noted


* repeat urine studies pending


Status: Acute   





(9) Prophylactic measure


Assessment and Plan: 


* Protonix 40mg IV Q12H


* SCDs


* hold Heparin 5000u SC Q8H possible coffee ground emesis


* Florastor 250mg PO BID secondary to vomitting/SBO


* PT/OT eval and treat: recommend for KITTY





Disposition: Patient is currently NPO on IV antibiotics for concern for SBO and 

possible coffee ground emesis?. GI and General surgery consult on board.  

Pending official report of echo. Patient's white count has resolved and remains 

afebrile.

## 2018-01-05 NOTE — CP.PCM.PN
Subjective





- Date & Time of Evaluation


Date of Evaluation: 01/05/18


Time of Evaluation: 09:10





- Subjective


Subjective: 





Surgery 





Pt s&e. NAEON. Per josiane, no BM. NGT 200cc overnight. 900cc since yesterday. 





Objective





- Vital Signs/Intake and Output


Vital Signs (last 24 hours): 


 











Temp Pulse Resp BP Pulse Ox


 


 98.6 F   80   20   145/77   95 


 


 01/05/18 08:39  01/05/18 08:39  01/05/18 08:39  01/05/18 08:39  01/05/18 08:39








Intake and Output: 


 











 01/05/18 01/05/18





 06:59 18:59


 


Intake Total 1300 


 


Output Total 1850 


 


Balance -550 














- Medications


Medications: 


 Current Medications





Acetaminophen (Tylenol 325mg Tab)  650 mg PO Q6 PRN


   PRN Reason: Fever >100.4 F


   Last Admin: 01/04/18 00:17 Dose:  650 mg


Albuterol/Ipratropium (Duoneb 3 Mg/0.5 Mg (3 Ml) Ud)  3 ml INH RQ6 PRN


   PRN Reason: Cough


Amiodarone HCl (Cordarone)  200 mg PO DAILY [facility]


   Last Admin: 01/04/18 12:27 Dose:  Not Given


Amlodipine Besylate (Norvasc)  10 mg PO DAILY [facility]


   Last Admin: 01/04/18 12:26 Dose:  Not Given


Aspirin (Aspirin Chewable)  81 mg PO DAILY [facility]


   Last Admin: 01/04/18 12:27 Dose:  Not Given


Dextrose (Dextrose 50% Inj)  0 ml IV STAT PRN; Protocol


   PRN Reason: Hypoglycemia Protocol


Dextrose (Glutose 15)  0 gm PO ONCE PRN; Protocol


   PRN Reason: Hypoglycemia Protocol


Glucagon (Glucagen Diagnostic Kit)  0 mg IM STAT PRN; Protocol


   PRN Reason: Hypoglycemia Protocol


Heparin Sodium (Porcine) (Heparin)  5,000 units SC Q12 [facility]


   Last Admin: 01/04/18 13:29 Dose:  Not Given


Dextrose (Dextrose 5% In Water 1000 Ml)  1,000 mls @ 0 mls/hr IV .Q0M PRN; 

Protocol; Per Protocol


   PRN Reason: Hypoglycemia Protocol


Vancomycin/Sodium Chloride (Vancomycin 1 Gm/Ns 200 Ml)  1 gm in 200 mls @ 166.7 

mls/hr IVPB Q24H [facility]


   Stop: 01/08/18 10:01


   Last Admin: 01/04/18 12:11 Dose:  166.7 mls/hr


Sodium Chloride (Sodium Chloride 0.9%)  1,000 mls @ 75 mls/hr IV .[id number] [facility]


   Last Admin: 01/05/18 00:51 Dose:  75 mls/hr


Piperacillin Sod/Tazobactam Sod (Zosyn 2.25 Gm Iv Premix)  2.25 gm in 50 mls @ 

100 mls/hr IVPB Q8H [facility]


   Last Admin: 01/05/18 08:32 Dose:  100 mls/hr


Insulin Glargine (Lantus)  9 unit SC [facility]


   Last Admin: 01/04/18 21:33 Dose:  Not Given


Insulin Human Regular (Novolin R)  [facility]


   PRN Reason: Protocol


   Last Admin: 01/05/18 08:13 Dose:  Not Given


Metoprolol Succinate (Toprol Xl)  50 mg PO DAILY [facility]


   Last Admin: 01/04/18 12:25 Dose:  Not Given


Metoprolol Tartrate (Lopressor)  5 mg IVP Q6H PRN


   PRN Reason: Systolic Blood Pressure


Omega-3-Acid Ethyl Esters (Lovaza)  1 gm PO BID [facility]


   Last Admin: 01/04/18 12:26 Dose:  Not Given


Ondansetron HCl (Zofran Inj)  4 mg IVP Q6H PRN


   PRN Reason: Nausea/Vomiting


   Last Admin: 01/04/18 03:42 Dose:  4 mg


Oxybutynin Chloride (Ditropan Xl)  5 mg PO DAILY [facility]


   Last Admin: 01/04/18 12:26 Dose:  Not Given


Pantoprazole Sodium (Protonix Inj)  40 mg IVP Q12H [facility]


   Last Admin: 01/05/18 00:45 Dose:  40 mg


Rivaroxaban (Xarelto)  20 mg PO DAILY [facility]


Rosuvastatin Calcium (Crestor)  10 mg PO HS [facility]


   Last Admin: 01/03/18 21:53 Dose:  10 mg


Saccharomyces Boulardii (Florastor)  250 mg PO BID [facility]


   Last Admin: 01/04/18 12:26 Dose:  Not Given


Sitagliptin Phosphate (Januvia)  50 mg PO DAILY [facility]


   Last Admin: 01/04/18 12:26 Dose:  Not Given











- Labs


Labs: 


 





 01/05/18 07:16 





 01/05/18 07:16 





 











PT  13.4 SECONDS (9.7-12.2)  H  01/04/18  13:46    


 


INR  1.2   01/04/18  13:46    














- Constitutional


Appears: No Acute Distress, Chronically Ill





- Head Exam


Head Exam: ATRAUMATIC, NORMAL INSPECTION, NORMOCEPHALIC





- Eye Exam


Eye Exam: EOMI, Normal appearance, PERRL


Pupil Exam: NORMAL ACCOMODATION, PERRL





- ENT Exam


ENT Exam: Mucous Membranes Moist, Normal Exam


Additional comments: 





NG in place 





- Neck Exam


Neck Exam: Full ROM, Normal Inspection.  absent: Lymphadenopathy





- Respiratory Exam


Respiratory Exam: Clear to Ausculation Bilateral, NORMAL BREATHING PATTERN





- Cardiovascular Exam


Cardiovascular Exam: REGULAR RHYTHM, +S1, +S2.  absent: Murmur





- GI/Abdominal Exam


GI & Abdominal Exam: Soft.  absent: Distended, Firm, Guarding, Tenderness, Mass

, Rebound





- Extremities Exam


Extremities Exam: Normal Inspection





- Back Exam


Back Exam: NORMAL INSPECTION





- Neurological Exam


Neurological Exam: Awake, CN II-XII Intact





- Psychiatric Exam


Psychiatric exam: Normal Affect, Normal Mood





- Skin


Skin Exam: Dry, Intact, Normal Color, Warm





Assessment and Plan





- Assessment and Plan (Free Text)


Assessment: 





77M with dilated stomach and mild enteritis





Plan: 


NGT 200cc/12hrs


NGT to Low continuous suction, do not block sump tube. Flush PRN to check for 

patency


Replete electrolytes


NPO


Contiue abx for UTI


Monitor for BM


Zofran prn


Serial abd exams


Will D/W Dr. Kulkarni

## 2018-01-05 NOTE — RAD
HISTORY:



COMPARISON:

01/02/2018



TECHNIQUE:

Chest PA and lateral



FINDINGS:



LINES AND TUBES:

The nasogastric tube terminates in the stomach. 



LUNG AND PLEURA:

The lungs are well inflated. The right lung is clear.  There is 

probable airspace disease with air bronchograms in the left lower 

lobe. 



HEART AND MEDIASTINUM:

There is moderate cardiomegaly. There is stable position of a 

left-sided PICC The hilar and mediastinal contours are within normal 

limits.



SKELETAL STRUCTURES:

The bony structures are within normal limits for the patient's age.



VISUALIZED UPPER ABDOMEN:

Normal.



OTHER FINDINGS:

None.



IMPRESSION:

Suspect left lower lobe pneumonia. Follow-up PA and lateral 

radiographs are recommended for definitive evaluation.

## 2018-01-05 NOTE — CARD
--------------- APPROVED REPORT --------------





EKG Measurement

Heart [id number]

MA [id number]

[id number]

[id number]

[id number]



<Conclusion>

Sinus tachycardia with premature supraventricular complexes

Left axis deviation

Left ventricular hypertrophy with repolarization abnormality

Inferior infarct, age undetermined

Abnormal ECG

## 2018-01-06 LAB
ALBUMIN SERPL-MCNC: 3.4 G/DL (ref 3.5–5)
ALBUMIN/GLOB SERPL: 1 {RATIO} (ref 1–2.1)
ALT SERPL-CCNC: 29 U/L (ref 21–72)
AST SERPL-CCNC: 27 U/L (ref 17–59)
BASOPHILS # BLD AUTO: 0.1 K/UL (ref 0–0.2)
BASOPHILS NFR BLD: 0.8 % (ref 0–2)
BUN SERPL-MCNC: 22 MG/DL (ref 9–20)
CALCIUM SERPL-MCNC: 8.5 MG/DL (ref 8.6–10.4)
EOSINOPHIL # BLD AUTO: 0.1 K/UL (ref 0–0.7)
EOSINOPHIL NFR BLD: 1.1 % (ref 0–4)
ERYTHROCYTE [DISTWIDTH] IN BLOOD BY AUTOMATED COUNT: 14.6 % (ref 11.5–14.5)
GFR NON-AFRICAN AMERICAN: 59
HGB BLD-MCNC: 13.1 G/DL (ref 12–18)
LYMPHOCYTES # BLD AUTO: 1.5 K/UL (ref 1–4.3)
LYMPHOCYTES NFR BLD AUTO: 15.9 % (ref 20–40)
MAGNESIUM SERPL-MCNC: 2 MG/DL (ref 1.6–2.3)
MCH RBC QN AUTO: 26.8 PG (ref 27–31)
MCHC RBC AUTO-ENTMCNC: 33.3 G/DL (ref 33–37)
MCV RBC AUTO: 80.5 FL (ref 80–94)
MONOCYTES # BLD: 1.1 K/UL (ref 0–0.8)
MONOCYTES NFR BLD: 11.5 % (ref 0–10)
NEUTROPHILS # BLD: 6.7 K/UL (ref 1.8–7)
NEUTROPHILS NFR BLD AUTO: 70.7 % (ref 50–75)
NRBC BLD AUTO-RTO: 0 % (ref 0–2)
PLATELET # BLD: 191 K/UL (ref 130–400)
PMV BLD AUTO: 9.1 FL (ref 7.2–11.7)
RBC # BLD AUTO: 4.87 MIL/UL (ref 4.4–5.9)
WBC # BLD AUTO: 9.4 K/UL (ref 4.8–10.8)

## 2018-01-06 RX ADMIN — INSULIN GLARGINE SCH UNITS: 100 INJECTION, SOLUTION SUBCUTANEOUS at 22:18

## 2018-01-06 RX ADMIN — Medication SCH MG: at 18:06

## 2018-01-06 RX ADMIN — CEFEPIME SCH MLS/HR: 1 INJECTION, SOLUTION INTRAVENOUS at 06:18

## 2018-01-06 RX ADMIN — HUMAN INSULIN SCH: 100 INJECTION, SOLUTION SUBCUTANEOUS at 17:54

## 2018-01-06 RX ADMIN — OMEGA-3-ACID ETHYL ESTERS SCH GM: 900 CAPSULE, LIQUID FILLED ORAL at 18:06

## 2018-01-06 RX ADMIN — HUMAN INSULIN SCH: 100 INJECTION, SOLUTION SUBCUTANEOUS at 12:00

## 2018-01-06 RX ADMIN — HUMAN INSULIN SCH: 100 INJECTION, SOLUTION SUBCUTANEOUS at 21:35

## 2018-01-06 RX ADMIN — HUMAN INSULIN SCH: 100 INJECTION, SOLUTION SUBCUTANEOUS at 08:30

## 2018-01-06 RX ADMIN — VANCOMYCIN HYDROCHLORIDE SCH MLS/HR: 1 INJECTION, POWDER, LYOPHILIZED, FOR SOLUTION INTRAVENOUS at 11:54

## 2018-01-06 RX ADMIN — CEFEPIME SCH MLS/HR: 1 INJECTION, SOLUTION INTRAVENOUS at 18:06

## 2018-01-06 NOTE — CP.PCM.PN
Subjective





- Date & Time of Evaluation


Date of Evaluation: 01/06/18


Time of Evaluation: 08:51





- Subjective


Subjective: 





Surgery 





Pt s&e. Minimal output on NGT. Denies F/C?N/V/D/CP/SOB. per nurse no BM. NGT 

removed. 





Objective





- Vital Signs/Intake and Output


Vital Signs (last 24 hours): 


 











Temp Pulse Resp BP Pulse Ox


 


 98.4 F   86   20   165/76 H  96 


 


 01/06/18 08:44  01/06/18 08:44  01/06/18 08:44  01/06/18 08:44  01/06/18 08:44








Intake and Output: 


 











 01/06/18 01/06/18





 06:59 18:59


 


Intake Total 800 


 


Output Total 1030 


 


Balance -230 














- Medications


Medications: 


 Current Medications





Acetaminophen (Tylenol 325mg Tab)  650 mg PO Q6 PRN


   PRN Reason: Fever >100.4 F


   Last Admin: 01/04/18 00:17 Dose:  650 mg


Albuterol/Ipratropium (Duoneb 3 Mg/0.5 Mg (3 Ml) Ud)  3 ml INH RQ6 PRN


   PRN Reason: Cough


Amiodarone HCl (Cordarone)  200 mg PO DAILY [facility]


   Last Admin: 01/05/18 10:43 Dose:  Not Given


Amlodipine Besylate (Norvasc)  10 mg PO DAILY [facility]


   Last Admin: 01/04/18 12:26 Dose:  Not Given


Aspirin (Aspirin Chewable)  81 mg PO DAILY [facility]


   Last Admin: 01/04/18 12:27 Dose:  Not Given


Dextrose (Dextrose 50% Inj)  0 ml IV STAT PRN; Protocol


   PRN Reason: Hypoglycemia Protocol


Dextrose (Glutose 15)  0 gm PO ONCE PRN; Protocol


   PRN Reason: Hypoglycemia Protocol


Glucagon (Glucagen Diagnostic Kit)  0 mg IM STAT PRN; Protocol


   PRN Reason: Hypoglycemia Protocol


Heparin Sodium (Porcine) (Heparin)  5,000 units SC Q12 [facility]


   Last Admin: 01/04/18 13:29 Dose:  Not Given


Vancomycin/Sodium Chloride (Vancomycin 1 Gm/Ns 200 Ml)  1 gm in 200 mls @ 166.7 

mls/hr IVPB Q24H [facility]


   Stop: 01/08/18 10:01


   Last Admin: 01/05/18 10:42 Dose:  166.7 mls/hr


Sodium Chloride (Sodium Chloride 0.9%)  1,000 mls @ 75 mls/hr IV .[id number] [facility]


   Last Admin: 01/05/18 16:34 Dose:  75 mls/hr


Cefepime HCl (Maxipime Iv 1 Gm Premix)  1 gm in 50 mls @ 100 mls/hr IVPB Q12H 

[facility]


   Last Admin: 01/06/18 06:18 Dose:  100 mls/hr


Insulin Glargine (Lantus)  9 unit SC [facility]


   Last Admin: 01/05/18 21:46 Dose:  Not Given


Insulin Human Regular (Novolin R)  0 unit SC [facility]


   PRN Reason: Protocol


   Last Admin: 01/06/18 08:30 Dose:  Not Given


Metoprolol Succinate (Toprol Xl)  50 mg PO DAILY [facility]


   Last Admin: 01/04/18 12:25 Dose:  Not Given


Metoprolol Tartrate (Lopressor)  5 mg IVP Q6H PRN


   PRN Reason: Systolic Blood Pressure


Omega-3-Acid Ethyl Esters (Lovaza)  1 gm PO BID [facility]


   Last Admin: 01/04/18 12:26 Dose:  Not Given


Ondansetron HCl (Zofran Inj)  4 mg IVP Q6H PRN


   PRN Reason: Nausea/Vomiting


   Last Admin: 01/04/18 03:42 Dose:  4 mg


Oxybutynin Chloride (Ditropan Xl)  5 mg PO DAILY [facility]


   Last Admin: 01/04/18 12:26 Dose:  Not Given


Pantoprazole Sodium (Protonix Inj)  40 mg IVP Q12H [facility]


   Last Admin: 01/06/18 01:53 Dose:  40 mg


Rivaroxaban (Xarelto)  20 mg PO DAILY [facility]


Rosuvastatin Calcium (Crestor)  10 mg PO [facility]


   Last Admin: 01/03/18 21:53 Dose:  10 mg


Saccharomyces Boulardii (Florastor)  250 mg PO BID [facility]


   Last Admin: 01/04/18 12:26 Dose:  Not Given


Sitagliptin Phosphate (Januvia)  50 mg PO DAILY [facility]


   Last Admin: 01/04/18 12:26 Dose:  Not Given











- Labs


Labs: 


 





 01/06/18 07:20 





 01/06/18 07:20 





 











PT  13.4 SECONDS (9.7-12.2)  H  01/04/18  13:46    


 


INR  1.2   01/04/18  13:46    














- Constitutional


Appears: No Acute Distress, Chronically Ill





- Head Exam


Head Exam: ATRAUMATIC, NORMAL INSPECTION, NORMOCEPHALIC





- Eye Exam


Eye Exam: EOMI, Normal appearance, PERRL


Pupil Exam: NORMAL ACCOMODATION, PERRL





- ENT Exam


ENT Exam: Mucous Membranes Moist, Normal Exam





- Neck Exam


Neck Exam: Full ROM, Normal Inspection.  absent: Lymphadenopathy





- Respiratory Exam


Respiratory Exam: Clear to Ausculation Bilateral, NORMAL BREATHING PATTERN





- Cardiovascular Exam


Cardiovascular Exam: REGULAR RHYTHM, +S1, +S2.  absent: Murmur





- GI/Abdominal Exam


GI & Abdominal Exam: Soft, Normal Bowel Sounds.  absent: Distended, Firm, 

Guarding, Rigid, Tenderness, Hernia, Mass, Rebound





- Rectal Exam


Rectal Exam: NORMAL INSPECTION





- Extremities Exam


Extremities Exam: Normal Capillary Refill, Normal Inspection.  absent: Joint 

Swelling, Pedal Edema





- Back Exam


Back Exam: NORMAL INSPECTION





- Neurological Exam


Neurological Exam: Awake.  absent: Normal Gait





- Skin


Skin Exam: Dry, Intact, Normal Color, Warm





Assessment and Plan





- Assessment and Plan (Free Text)


Assessment: 





Ileus v SBO: improving.


-Swallow eval


-MOnitor BM/V





Omar Kulkarni

## 2018-01-06 NOTE — CP.PCM.PN
<Chandrika Villafuerte - Last Filed: 01/06/18 10:55>





Subjective





- Date & Time of Evaluation


Date of Evaluation: 01/06/18


Time of Evaluation: 08:30





- Subjective


Subjective: 


GI Fellow PGY4 Progress Note


Pt seen and evaluated at bedside, pt denies any abdominal pain. Per nursing no 

BM overnight. NGT out. Pt denies any prior hx of abdominal pain, nausea or 

vomiting associated with food intake. Pt states he is hungry.  


ROS: A 12pt ROS was negative except as above. 








Objective





- Vital Signs/Intake and Output


Vital Signs (last 24 hours): 


 











Temp Pulse Resp BP Pulse Ox


 


 98.4 F   86   20   165/76 H  96 


 


 01/06/18 08:44  01/06/18 08:44  01/06/18 08:44  01/06/18 08:44  01/06/18 08:44








Intake and Output: 


 











 01/06/18 01/06/18





 06:59 18:59


 


Intake Total 800 


 


Output Total 1030 


 


Balance -230 














- Medications


Medications: 


 Current Medications





Acetaminophen (Tylenol 325mg Tab)  650 mg PO Q6 PRN


   PRN Reason: Fever >100.4 F


   Last Admin: 01/04/18 00:17 Dose:  650 mg


Albuterol/Ipratropium (Duoneb 3 Mg/0.5 Mg (3 Ml) Ud)  3 ml INH RQ6 PRN


   PRN Reason: Cough


Amiodarone HCl (Cordarone)  200 mg PO DAILY [facility]


   Last Admin: 01/06/18 09:37 Dose:  Not Given


Amlodipine Besylate (Norvasc)  10 mg PO DAILY [facility]


   Last Admin: 01/04/18 12:26 Dose:  Not Given


Aspirin (Aspirin Chewable)  81 mg PO DAILY [facility]


   Last Admin: 01/04/18 12:27 Dose:  Not Given


Dextrose (Dextrose 50% Inj)  0 ml IV STAT PRN; Protocol


   PRN Reason: Hypoglycemia Protocol


Dextrose (Glutose 15)  0 gm PO ONCE PRN; Protocol


   PRN Reason: Hypoglycemia Protocol


Glucagon (Glucagen Diagnostic Kit)  0 mg IM STAT PRN; Protocol


   PRN Reason: Hypoglycemia Protocol


Heparin Sodium (Porcine) (Heparin)  5,000 units SC Q12 [facility]


   Last Admin: 01/04/18 13:29 Dose:  Not Given


Vancomycin/Sodium Chloride (Vancomycin 1 Gm/Ns 200 Ml)  1 gm in 200 mls @ 166.7 

mls/hr IVPB Q24H [facility]


   Stop: 01/08/18 10:01


   Last Admin: 01/05/18 10:42 Dose:  166.7 mls/hr


Sodium Chloride (Sodium Chloride 0.9%)  1,000 mls @ 75 mls/hr IV .[id number] [facility]


   Last Admin: 01/05/18 16:34 Dose:  75 mls/hr


Cefepime HCl (Maxipime Iv 1 Gm Premix)  1 gm in 50 mls @ 100 mls/hr IVPB Q12H 

[facility]


   Last Admin: 01/06/18 06:18 Dose:  100 mls/hr


Potassium Chloride (Potassium Chloride 10 Meq/100 Ml)  10 meq in 100 mls @ 100 

mls/hr IVPB Q1H [facility]


   Stop: 01/06/18 11:44


   Last Admin: 01/06/18 10:25 Dose:  100 mls/hr


Insulin Glargine (Lantus)  9 unit SC [facility]


   Last Admin: 01/05/18 21:46 Dose:  Not Given


Insulin Human Regular (Novolin R)  0 unit SC [facility]


   PRN Reason: Protocol


   Last Admin: 01/06/18 08:30 Dose:  Not Given


Metoprolol Succinate (Toprol Xl)  50 mg PO DAILY [facility]


   Last Admin: 01/04/18 12:25 Dose:  Not Given


Metoprolol Tartrate (Lopressor)  5 mg IVP Q6H PRN


   PRN Reason: Systolic Blood Pressure


Omega-3-Acid Ethyl Esters (Lovaza)  1 gm PO BID [facility]


   Last Admin: 01/04/18 12:26 Dose:  Not Given


Ondansetron HCl (Zofran Inj)  4 mg IVP Q6H PRN


   PRN Reason: Nausea/Vomiting


   Last Admin: 01/04/18 03:42 Dose:  4 mg


Oxybutynin Chloride (Ditropan Xl)  5 mg PO DAILY [facility]


   Last Admin: 01/04/18 12:26 Dose:  Not Given


Pantoprazole Sodium (Protonix Inj)  40 mg IVP Q12H [facility] includes [facility]


   Last Admin: 01/06/18 01:53 Dose:  40 mg


Rivaroxaban (Xarelto)  20 mg PO DAILY [facility]


Rosuvastatin Calcium (Crestor)  10 mg PO [facility]


   Last Admin: 01/03/18 21:53 Dose:  10 mg


Saccharomyces Boulardii (Florastor)  250 mg PO BID [facility]


   Last Admin: 01/04/18 12:26 Dose:  Not Given


Sitagliptin Phosphate (Januvia)  50 mg PO DAILY [facility]


   Last Admin: 01/04/18 12:26 Dose:  Not Given











- Labs


Labs: 


 





 01/06/18 07:20 





 01/06/18 07:20 





 











PT  13.4 SECONDS (9.7-12.2)  H  01/04/18  13:46    


 


INR  1.2   01/04/18  13:46    














- Constitutional


Appears: Non-toxic, No Acute Distress





- Head Exam


Head Exam: ATRAUMATIC, NORMAL INSPECTION, NORMOCEPHALIC





- Eye Exam


Eye Exam: EOMI, PERRL





- ENT Exam


ENT Exam: Mucous Membranes Dry





- Respiratory Exam


Respiratory Exam: NORMAL BREATHING PATTERN





- Cardiovascular Exam


Cardiovascular Exam: Irregular Rhythm





- GI/Abdominal Exam


GI & Abdominal Exam: Soft.  absent: Distended, Guarding, Tenderness





- Extremities Exam


Extremities Exam: Normal Inspection





- Neurological Exam


Neurological Exam: Alert, Awake





- Psychiatric Exam


Psychiatric exam: Normal Affect, Normal Mood





- Skin


Skin Exam: Dry, Intact, Normal Color, Warm





Assessment and Plan





- Assessment and Plan (Free Text)


Assessment: 


This is a a 77yM presenting with fevers and weakness, found to UTI and 

bacteremia. 


1. SBO


2. Sepsis 


3. UTI


4. Atrial fibrillation on OAC




















Plan: 


-Continue supportive care 


- NGT decompression, removed, Abd xray viewed much improved 


- Antibiotic therapy for UTI as per ID


- H/H stable, continue to monitor CBC in am 


- Continue with PPI daily


- Will advance to clear liquid diet today


- If pt unable to tolerate will consider push enteroscopy since proximal bowel 

obstruction can also consider CT Angio is renal function okay 


- Will continue to follow pt closely 











<Jordin Paet - Last Filed: 01/06/18 12:57>





Objective





- Vital Signs/Intake and Output


Vital Signs (last 24 hours): 


 











Temp Pulse Resp BP Pulse Ox


 


 98.4 F   86   20   165/76 H  96 


 


 01/06/18 08:44  01/06/18 08:44  01/06/18 08:44  01/06/18 08:44  01/06/18 08:44








Intake and Output: 


 











 01/06/18 01/06/18





 06:59 18:59


 


Intake Total 800 


 


Output Total 1030 


 


Balance -230 














- Medications


Medications: 


 Current Medications





Acetaminophen (Tylenol 325mg Tab)  650 mg PO Q6 PRN


   PRN Reason: Fever >100.4 F


   Last Admin: 01/04/18 00:17 Dose:  650 mg


Albuterol/Ipratropium (Duoneb 3 Mg/0.5 Mg (3 Ml) Ud)  3 ml INH RQ6 PRN


   PRN Reason: Cough


Amiodarone HCl (Cordarone)  200 mg PO DAILY [facility]


   Last Admin: 01/06/18 09:37 Dose:  Not Given


Amlodipine Besylate (Norvasc)  10 mg PO DAILY [facility]


   Last Admin: 01/04/18 12:26 Dose:  Not Given


Aspirin (Aspirin Chewable)  81 mg PO DAILY [facility]


   Last Admin: 01/04/18 12:27 Dose:  Not Given


Dextrose (Dextrose 50% Inj)  0 ml IV STAT PRN; Protocol


   PRN Reason: Hypoglycemia Protocol


Dextrose (Glutose 15)  0 gm PO ONCE PRN; Protocol


   PRN Reason: Hypoglycemia Protocol


Glucagon (Glucagen Diagnostic Kit)  0 mg IM STAT PRN; Protocol


   PRN Reason: Hypoglycemia Protocol


Heparin Sodium (Porcine) (Heparin)  5,000 units SC Q12 [facility] includes [facility]


   Last Admin: 01/04/18 13:29 Dose:  Not Given


Vancomycin/Sodium Chloride (Vancomycin 1 Gm/Ns 200 Ml)  1 gm in 200 mls @ 166.7 

mls/hr IVPB Q24H [facility] includes [facility]


   Stop: 01/08/18 10:01


   Last Admin: 01/06/18 11:54 Dose:  166.7 mls/hr


Sodium Chloride (Sodium Chloride 0.9%)  1,000 mls @ 75 mls/hr IV .[id number] Count includes the [facility]


   Last Admin: 01/05/18 16:34 Dose:  75 mls/hr


Cefepime HCl (Maxipime Iv 1 Gm Premix)  1 gm in 50 mls @ 100 mls/hr IVPB Q12H 

Count includes [facility]


   Last Admin: 01/06/18 06:18 Dose:  100 mls/hr


Insulin Glargine (Lantus)  9 unit SC [facility]


   Last Admin: 01/05/18 21:46 Dose:  Not Given


Insulin Human Regular (Novolin R)  0 unit SC [facility]


   PRN Reason: Protocol


   Last Admin: 01/06/18 08:30 Dose:  Not Given


Metoprolol Succinate (Toprol Xl)  50 mg PO DAILY [facility] includes [facility]


   Last Admin: 01/04/18 12:25 Dose:  Not Given


Metoprolol Tartrate (Lopressor)  5 mg IVP Q6H PRN


   PRN Reason: Systolic Blood Pressure


Omega-3-Acid Ethyl Esters (Lovaza)  1 gm PO BID Count includes the [facility]


   Last Admin: 01/04/18 12:26 Dose:  Not Given


Ondansetron HCl (Zofran Inj)  4 mg IVP Q6H PRN


   PRN Reason: Nausea/Vomiting


   Last Admin: 01/04/18 03:42 Dose:  4 mg


Oxybutynin Chloride (Ditropan Xl)  5 mg PO DAILY Count includes the [facility]


   Last Admin: 01/04/18 12:26 Dose:  Not Given


Pantoprazole Sodium (Protonix Inj)  40 mg IVP Q12H Count includes the [facility]


   Last Admin: 01/06/18 01:53 Dose:  40 mg


Rivaroxaban (Xarelto)  20 mg PO DAILY Count includes the [facility]


Rosuvastatin Calcium (Crestor)  10 mg PO HS Count includes the [facility]


   Last Admin: 01/03/18 21:53 Dose:  10 mg


Saccharomyces Boulardii (Florastor)  250 mg PO BID Count includes the [facility]


   Last Admin: 01/04/18 12:26 Dose:  Not Given


Sitagliptin Phosphate (Januvia)  50 mg PO DAILY Count includes the [facility]


   Last Admin: 01/04/18 12:26 Dose:  Not Given











- Labs


Labs: 


 





 01/06/18 07:20 





 01/06/18 07:20 





 











PT  13.4 SECONDS (9.7-12.2)  H  01/04/18  13:46    


 


INR  1.2   01/04/18  13:46    














Attending/Attestation





- Attestation


I have personally seen and examined this patient.: Yes


I have fully participated in the care of the patient.: Yes


I have reviewed all pertinent clinical information, including history, physical 

exam and plan: Yes


Notes (Text): 





01/06/18 12:56


77 year old male with h/o cholecystectomy admitted with UTI, bacteremia, found 

to hae small bowel obstruction.  


1. Small bowel obstruction


Plan: 


- x ray looks improved


- trial of liquid diet today


- no abdominal pain


- surgery following


- would recommend upper endoscopy/enteroscopy electively at some point


- may consider CT angio considering evidence of atherosclerotic disease in 

celiac/sma, but he also has some mild CKD


- continue PPI

## 2018-01-06 NOTE — CP.PCM.PN
<Geneva Eason - Last Filed: 18 04:54>





Subjective





- Date & Time of Evaluation


Date of Evaluation: 18


Time of Evaluation: 04:13





- Subjective


Subjective: 





Medicine Progress Note: Hospitalist Service





Patient seen and examined at bedside. Per nursing no acute events overnight. 

Patient is doing well, NGT in place. Passing flatus. Offers no complaints at 

this time. Denies headaches, dizziness, cp, palpitations, sob, abdominal pain, 

urinary symptoms, changes in bowel habits. 





Objective





- Vital Signs/Intake and Output


Vital Signs (last 24 hours): 


 











Temp Pulse Resp BP Pulse Ox


 


 97.7 F   89   20   155/87 H  97 


 


 18 00:00  18 00:00  18 00:00  18 00:00  18 00:00








Intake and Output: 


 











 18





 18:59 06:59


 


Intake Total 600 200


 


Output Total 1220 200


 


Balance -620 0














- Medications


Medications: 


 Current Medications





Acetaminophen (Tylenol 325mg Tab)  650 mg PO Q6 PRN


   PRN Reason: Fever >100.4 F


   Last Admin: 18 00:17 Dose:  650 mg


Albuterol/Ipratropium (Duoneb 3 Mg/0.5 Mg (3 Ml) Ud)  3 ml INH RQ6 PRN


   PRN Reason: Cough


Amiodarone HCl (Cordarone)  200 mg PO DAILY [facility]


   Last Admin: 18 10:43 Dose:  Not Given


Amlodipine Besylate (Norvasc)  10 mg PO DAILY [facility]


   Last Admin: 18 12:26 Dose:  Not Given


Aspirin (Aspirin Chewable)  81 mg PO DAILY [facility]


   Last Admin: 18 12:27 Dose:  Not Given


Dextrose (Dextrose 50% Inj)  0 ml IV STAT PRN; Protocol


   PRN Reason: Hypoglycemia Protocol


Dextrose (Glutose 15)  0 gm PO ONCE PRN; Protocol


   PRN Reason: Hypoglycemia Protocol


Glucagon (Glucagen Diagnostic Kit)  0 mg IM STAT PRN; Protocol


   PRN Reason: Hypoglycemia Protocol


Heparin Sodium (Porcine) (Heparin)  5,000 units SC Q12 [facility]


   Last Admin: 18 13:29 Dose:  Not Given


Vancomycin/Sodium Chloride (Vancomycin 1 Gm/Ns 200 Ml)  1 gm in 200 mls @ 166.7 

mls/hr IVPB Q24H [facility]


   Stop: 18 10:01


   Last Admin: 18 10:42 Dose:  166.7 mls/hr


Sodium Chloride (Sodium Chloride 0.9%)  1,000 mls @ 75 mls/hr IV [id number] [facility]


   Last Admin: 18 16:34 Dose:  75 mls/hr


Cefepime HCl (Maxipime Iv 1 Gm Premix)  1 gm in 50 mls @ 100 mls/hr IVPB Q12H 

[facility]


   Last Admin: 18 20:28 Dose:  100 mls/hr


Insulin Glargine (Lantus)  9 unit SC [facility]


   Last Admin: 18 21:46 Dose:  Not Given


Insulin Human Regular (Novolin R)  [facility]


   PRN Reason: Protocol


   Last Admin: 18 21:46 Dose:  Not Given


Metoprolol Succinate (Toprol Xl)  50 mg PO DAILY [facility]


   Last Admin: 18 12:25 Dose:  Not Given


Metoprolol Tartrate (Lopressor)  5 mg IVP Q6H PRN


   PRN Reason: Systolic Blood Pressure


Omega-3-Acid Ethyl Esters (Lovaza)  1 gm PO BID [facility]


   Last Admin: 18 12:26 Dose:  Not Given


Ondansetron HCl (Zofran Inj)  4 mg IVP Q6H PRN


   PRN Reason: Nausea/Vomiting


   Last Admin: 18 03:42 Dose:  4 mg


Oxybutynin Chloride (Ditropan Xl)  5 mg PO DAILY [facility]


   Last Admin: 18 12:26 Dose:  Not Given


Pantoprazole Sodium (Protonix Inj)  40 mg IVP Q12H [facility]


   Last Admin: 18 01:53 Dose:  40 mg


Rivaroxaban (Xarelto)  20 mg PO DAILY [facility]


Rosuvastatin Calcium (Crestor)  10 mg PO [facility]


   Last Admin: 18 21:53 Dose:  10 mg


Saccharomyces Boulardii (Florastor)  250 mg PO BID [facility]


   Last Admin: 18 12:26 Dose:  Not Given


Sitagliptin Phosphate (Januvia)  50 mg PO DAILY [facility]


   Last Admin: 18 12:26 Dose:  Not Given











- Labs


Labs: 


 





 18 07:16 





 18 07:16 





 











PT  13.4 SECONDS (9.7-12.2)  H  18  13:46    


 


INR  1.2   18  13:46    














- Constitutional


Appears: Well, No Acute Distress





- Head Exam


Head Exam: ATRAUMATIC, NORMAL INSPECTION





- Eye Exam


Eye Exam: EOMI, Normal appearance





- ENT Exam


ENT Exam: Mucous Membranes Moist





- Respiratory Exam


Respiratory Exam: Decreased Breath Sounds, Rhonchi, NORMAL BREATHING PATTERN.  

absent: Rales, Wheezes





- Cardiovascular Exam


Cardiovascular Exam: REGULAR RHYTHM, +S1, +S2





- GI/Abdominal Exam


GI & Abdominal Exam: Soft, Normal Bowel Sounds.  absent: Distended, Firm, 

Guarding, Rigid, Tenderness





- Rectal Exam


Rectal Exam: Deferred





- Extremities Exam


Additional comments: 





+SCDs





- Neurological Exam


Neurological Exam: Alert, Awake, Oriented x3


Neuro motor strength exam: Left Upper Extremity: 0, Right Upper Extremity: 5, 

Left Lower Extremity: 3, Right Lower Extremity: 5





- Psychiatric Exam


Psychiatric exam: Normal Affect, Normal Mood





- Skin


Skin Exam: Dry, Normal Color





Assessment and Plan





- Assessment and Plan (Free Text)


Assessment: 





(1) Small bowel obstruction, Enteritis, Possible Coffee ground emesis


Assessment and Plan:


* Patient had moderately distended abdomen with episode of coffee ground emesis 

this morning


* CT abd/pelvis: distended stomach, mild proximal small bowel wall thickening, 

correlate clinically for possible enteritis (per discussion with Radiologist)


* General Surgery Consulted, Dr Kulkarni, help appreciated


* NGT was placed on 18, approx 550cc of coffee ground emesis in cannister


* GI consulted, Dr Khoury, help appreciated- Enteritis likely was causing 

transient SBO


* Continue Protonix 40mg IVP


* Diet NPO


* Hgb has been stable 


* Vital signs are stable


* Monitor NGT output


* Continue gentle hydration with NS @ 75cc/hr


* Continue antibiotics


* Abdominal distention has improved


* Continue to monitor bowel function 





(2) Sepsis


Assessment and Plan: 


* Suspecting Urinary tract infection, Pneumonia, Bacteremia


* Code Sepsis 18: 11:40 AM in ED. Criteria - elevated WBC, elevated HR, 

Fever, elevated Lactate. In the ED -> Aztreonam, Vancomycin, NS bolus.


* Patient not given fluid as 30 mg/kg given concern for congestive heart 

failure.


* Infectious Disease on consult, Dr Quinteros, help appreciated 


* Patient is stable, afebrile


* Leukocytosis resolved 


* Antibiotics: Zosyn 2.25 mg IVP Q8H, Vancomycin 1g IVP Q24H


* Tylenol 650mg PO Q6H PRN for fever


* Continue Gentle hydration with NS @ 75 cc/hr


* 18 Urine Cx growing enterobacter aerogenes


* 18 Blood Cx (10:45am): No growth


* 18 Blood Cx (11:00am): Growing Streptococcus Mitis and Coagulase Negative 

Staphlococcus


* 1/3/18 Blood Cx showing no growth after 48 hours x 2


* Initial Sputum Cx contaminated, f/u repeat sputum cx


* Repeat Urine Cx collected 18 pending 


* Throat culture showing no growth, Flu negative, urine legionella negative


* Mycoplasma IgG 1.84 (high), Mycoplasma IgM 30 (within normal limits)


* Procal 0.80, Lactic acid normalized


* Duonebs PRN shortness of breathe


* Oxygen 3 Liter for nasal cannula


* Imaging:


 * CT chest w/o contrast: No consolidative infiltrate suggested, Mild bibasilar 

peripheral subplerual septal lines noted, Bilateral renal masses, There are at 

least 2 right renal masses that appear hypodense (please see full report)


 * Bladder US: Renal cysts; right kidney 11.3x7.6x10.2cm cyst, 1.3x1.3.1.2cm 

cyst; left kidney 1.3x.10.0.9cm cyst 


 * CXR on admission: No consolidation, cardiomegaly with limited visualization 

of the left costophrenic angle (see full report)


 * CT abd/pelvis: 11.1 x 10.2 cm complex right cystic lesion with evidence of 

calcification and septation. 7mm right upper pole echogenic lesion, 

indeterminate. 17mm low-density, left upper pole kidney 


 * CXR 17: suspect left lower pneumonia, F/U PA/Lateral radiographs 

recommended


Status: Acute   Priority: High   





(3) History of Atrial fibrillation


Assessment and Plan: 


* Hx of Pacemaker (or AICD)


* CHADS score 5 -> 12.5% risk of event per yr w/o AO; HASBLED 4 -> Patient at 

high risk for major bleeding


* Aspirin 81mg PO QDaily- on hold at this time 


* Metoprolol Succinate 50mg PO QDaily - on hold at this time 


* Amiodarone 200mg PO QDaily - on hold at this time


* TSH and Free T4 within normal limits


* Patient sees Dr Astudillo outpatient


* Per discussion with Dr Dent, patient is supposed to be on Xarelto 20mg PO 

daily


* Cardiology on consult, Dr Chapman, help appreciated


Status: Chronic   Priority: High   





(4) Cardiomegaly


Assessment and Plan: 


* Echo from  showed normal EF and some possible diastolic dysfunction


* Repeat Echo completed, f/u official report


* Per cardio, patient has preserved LVEF with mild mitral regurgitation


* Gentle IV hydration


Status: Acute   Priority: High   





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


Assessment and Plan: 


* Aspirin 81mg PO QD - on hold at this time


* Crestor 10 mg PO HS - on hold at this time


* Blood pressure control with Lopressor IV PRN


* PT and OT on board


Status: Chronic   Priority: Medium   





(6) Diabetes mellitus


Assessment and Plan: 


* Lantus decreased to 9 units HS as patient is currently NPO 


* ISS medium dose, Accuchecks Q6H


* Diet NPO


* HgbA1C 8.1


* Januvia 50mg PO QDaily - on hold at this tme


* HOLD metformin 1000mg PO BID 2/2 mild elevation in lactate


* Hypoglycemia protocol


Status: Chronic   Priority: Medium   





(7) HTN (hypertension)


Assessment and Plan: 


* BP well controlled


* Metoprolol succinate 50mg PO QDaily - on hold at this time


* Norvasc 10mg PO QDaily - on hold at this time


* Lopressor 5mg IVP Q6H prn SBP > 160


Status: Chronic   Priority: High   





(8) Lipid disorder


Assessment and Plan: 


* Lipid panel- T, Chol: 83, LDL<30, HDL: 27


* Lovaza 1g PO BID - on hold at this time


* Crestor 10 mg PO HS - on hold at this time


Status: Chronic 





(9) Abnormal urinalysis


Assessment and Plan: 


* History of Urinary Incontinence


* Straight cath in ED with 200ml of normal appearing urine given he appeared he 

had difficulty urinating in urinal


* Bladder scan PRN


* Urine culture growing enterobacter aerogenes, sensitivities reviewed


* Repeat UA 18: +1 ketones, +1 blood, +2 Leuk esterase, 24 WBCs


* Repeat Urine culture pending 


* Contiue Oxybutynin XL 5mg PO QDaily - on hold 


* CT Abdomen/Pelvis (w/o contrast): 11.11 X 10.2cm complex right cystic lesion 

with calcification and septation. nonobstructing left renal calculus. 

Distension of the stomach. Mild proximal small bowel wall thickening. Blood 

loop appear within normal limits of caliber


* Per PMD, in  patient had right renal cyst that measured 9cm


* Urology on consult, Dr Bender, help appreciated 


Status: Acute   





(10) Hypokalemia


Assessment and Plan: 


* Potassium 3.5 yesterday


* Repleted, will continue to monitor





(10) Prophylactic measure


Assessment and Plan: 


* Protonix 40mg IV Q12H


* SCDs


* hold Heparin 5000u SC Q8H possible coffee ground emesis


* Florastor 250mg PO BID secondary to vomitting/SBO


* PT/OT eval and treat: recommend for KITTY








Disposition: Patient is currently NPO on IV antibiotics for concern for SBO and 

coffee ground emesis. GI and General surgery consult. Awaiting 1/3/18 blood 

cultures and final report of 18 blood culture. Pending official report of 

echo. 





<Juliann Bird V - Last Filed: 18 13:18>





Objective





- Vital Signs/Intake and Output


Vital Signs (last 24 hours): 


 











Temp Pulse Resp BP Pulse Ox


 


 98 F   87   20   154/85 H  96 


 


 18 23:31  18 23:31  18 23:31  18 23:31  18 23:31








Intake and Output: 


 











 18





 06:59 18:59


 


Output Total 1000 


 


Balance -1000 














- Medications


Medications: 


 Current Medications





Acetaminophen (Tylenol 325mg Tab)  650 mg PO Q6 PRN


   PRN Reason: Fever >100.4 F


   Last Admin: 18 00:17 Dose:  650 mg


Albuterol/Ipratropium (Duoneb 3 Mg/0.5 Mg (3 Ml) Ud)  3 ml INH RQ6 PRN


   PRN Reason: Cough


Amiodarone HCl (Cordarone)  200 mg PO DAILY [facility]


   Last Admin: 18 10:11 Dose:  200 mg


Amlodipine Besylate (Norvasc)  10 mg PO DAILY [facility]


   Last Admin: 18 10:11 Dose:  10 mg


Aspirin (Aspirin Chewable)  81 mg PO DAILY [facility]


   Last Admin: 18 10:09 Dose:  81 mg


Dextrose (Dextrose 50% Inj)  0 ml IV STAT PRN; Protocol


   PRN Reason: Hypoglycemia Protocol


Dextrose (Glutose 15)  0 gm PO ONCE PRN; Protocol


   PRN Reason: Hypoglycemia Protocol


Glucagon (Glucagen Diagnostic Kit)  0 mg IM STAT PRN; Protocol


   PRN Reason: Hypoglycemia Protocol


Heparin Sodium (Porcine) (Heparin)  5,000 units SC Q12 [facility]


   Last Admin: 18 13:29 Dose:  Not Given


Vancomycin/Sodium Chloride (Vancomycin 1 Gm/Ns 200 Ml)  1 gm in 200 mls @ 166.7 

mls/hr IVPB Q24H [facility]


   Stop: 18 10:01


   Last Admin: 18 10:44 Dose:  166.7 mls/hr


Sodium Chloride (Sodium Chloride 0.9%)  1,000 mls @ 75 mls/hr IV .[id number] [facility]


   Last Admin: 18 22:21 Dose:  75 mls/hr


Cefepime HCl (Maxipime Iv 1 Gm Premix)  1 gm in 50 mls @ 100 mls/hr IVPB Q12H 

[facility]


   Last Admin: 18 06:54 Dose:  100 mls/hr


Insulin Glargine (Lantus)  9 unit SC [facility]


   Last Admin: 18 22:18 Dose:  9 units


Insulin Human Regular (Novolin R)  0 unit SC [facility]


   PRN Reason: Protocol


   Last Admin: 18 08:30 Dose:  Not Given


Metoprolol Succinate (Toprol Xl)  50 mg PO DAILY [facility]


   Last Admin: 18 10:12 Dose:  50 mg


Metoprolol Tartrate (Lopressor)  5 mg IVP Q6H PRN


   PRN Reason: Systolic Blood Pressure


Omega-3-Acid Ethyl Esters (Lovaza)  1 gm PO BID [facility]


   Last Admin: 18 10:10 Dose:  1 gm


Ondansetron HCl (Zofran Inj)  4 mg IVP Q6H PRN


   PRN Reason: Nausea/Vomiting


   Last Admin: 18 03:42 Dose:  4 mg


Oxybutynin Chloride (Ditropan Xl)  5 mg PO DAILY [facility]


   Last Admin: 18 10:10 Dose:  5 mg


Pantoprazole Sodium (Protonix Ec Tab)  40 mg PO DAILY [facility]


   Last Admin: 18 10:20 Dose:  40 mg


Polyethylene Glycol (Miralax)  17 gm PO DAILY [facility]


   Last Admin: 18 10:40 Dose:  17 gm


Potassium Chloride (Potassium Chloride Oral Soln)  40 meq PO Q4H [facility]


   Stop: 18 14:31


   Last Admin: 18 10:40 Dose:  40 meq


Rivaroxaban (Xarelto)  20 mg PO DAILY [facility]


Rosuvastatin Calcium (Crestor)  10 mg PO HS [facility]


   Last Admin: 18 22:18 Dose:  10 mg


Saccharomyces Boulardii (Florastor)  250 mg PO BID [facility]


   Last Admin: 18 10:09 Dose:  250 mg


Sitagliptin Phosphate (Januvia)  50 mg PO DAILY [facility]


   Last Admin: 18 10:11 Dose:  50 mg











- Labs


Labs: 


 





 18 08:39 





 18 08:37 





 











PT  13.4 SECONDS (9.7-12.2)  H  18  13:46    


 


INR  1.2   18  13:46    














Attending/Attestation





- Attestation


I have personally seen and examined this patient.: Yes


I have fully participated in the care of the patient.: Yes


I have reviewed all pertinent clinical information, including history, physical 

exam and plan: Yes


Notes (Text): 


This is late computer entry for 18.


Patient seen in the afternoon. Patient has had NGT tube removed this morning by 

surgery team.


Patient seen and he reports he is feeling better. Patient reports he had flatus 

but has not had a bowel movement yet.


Patient's anticoagulation on hold given concern from 18 if if vomitted up 

gastric contents versus coffee ground emesis. H/H remains stable.


Discussed with ID, patient to continue IV antibiotics. White count has 

normalized and patient is afebrile.


Electrolytes repleted


Will need to follow-up with GI and General surgery.





Assessment/Plan


(1) Sepsis


Assessment and Plan: 


* Suspecting:urinary tract infection, possible bacteremia, pneumonia


* Code Sepsis: 11:40 AM on 18 in ED. Criteria - elevated WBC, elevated HR, 

Fever, elevated Lactate. In the ED -> Aztreonam, Vancomycin, NS bolus.


* Patient not given fluid as 30 mg/kg given concern for congestive heart 

failure.


* Infectious Disease consult, Dr Quinterso on board


 * D/c Zosyn and Vanco


 * Repeat UA and urine cultures 18


* Pending official read of echocardiogram


* Flu NEGATIVE


* Blood culture (18 11:00): Strepococcus Mitis and Coag Neg Staph


* Blood culture (18 10:45): no growth after3 days


* Blood culture (1/3/18): No growth X2


* Urine culture (18): Enterobacter Aerogenes-->HANNAH for Cefepime, Invanz, 

and Ciprofloxocin


* Urine culture (18): no growth


* Negative for strep throat


* Sputum (1/3/17): contaminated


* F/U strep pnumoniae Ag, mycoplasma IgM, legionella Ag: negative


* Procalcitonin: 0.80


 Imaging:


 * CT chest w/o contrast (18): no consolidative infiltrate suggested, mild 

bibasilar peripheral subpleural septal lines. b/l renal masses. no gross 

hydronephrosis appreciated. few small left parapelvic renal cyst possible, mild 

perirenal stranding inflammatory changes present


* Duonebs PRN shortness of breathe


* Oxygen 3 Liter for nasal cannula


* Cefepime 1gram IVPB Q12 (active since 18)





Meds:


Tylenol 650mg PO Q6H PRN for fever





Status: Acute   Priority: High   





(2) Small Bowel Obstruction


       Enteritis


       Possible coffee ground emesis


Assessment and Plan: 


* General surgery (Dr. Kulkarni) on board-->help appreciated


* GI (Dr. Medina) on board-->help appreciated


* NGT placed 18 550 abdominal contents question of coffee ground emesis 

removed. Abdomen exam improved.


* CT Abdomen/Pelvis (w/o contrast): 11.11 X 10.2cm complex right cystic lesion 

with calcification and septation. nonobstructing left renal calculus. 

Distension of the stomach. Mild proximal small bowel wall thickening. Blood 

loop appear within normal limits of caliber


* Aspirin, Xarelto held in light of possible coffee ground emesis 18


* Medications switched to IV if possible


* Patient reports flatus. Has not had bowel movement. NGT tube removed on 


* Cefepime 1gram IV Q12H (active since 18)





Status: Acute   Priority: High 





(3) History of Atrial fibrillation


Assessment and Plan: 


* Hx of Pacemaker (or AICD)


* Cardiology consult, Dr Chapman


* CHADS 5 -> 12.5% risk of event per yr w/o AO; HASBLED 4 -> Patient at high 

risk for major bleeding


* HOLD Aspirin 81mg PO QDaily-->possible coffee ground emesis


* HOLD Metoprolol Succinate 50mg PO QDaily secondary to vomitting/SBO-->

switched to Lopressor 5mg IV Q 6H given if SBP>160


* Hold Increased Amiodarone 200mg PO QDaily  secondary to vomitting/SBO


* TSH/Free T4


* Resident has spoken with patient's family physician: chemical anticoagulation 

had be discontinued secondary to falls on 18


Status: Chronic   Priority: High   





(4) Cardiomegaly


Assessment and Plan: 


* Echo from  showed normal EF and some possible diastolic dysfunction


* F/U Echo--Pending official read; cardiology on call noted normal V function, 

and mild MR


* Gentle IV hydration


* CT chest w/o contrast (18): no consolidative infiltrate suggested, mild 

bibasilar peripheral subpleural septal lines. b/l renal masses. no gross 

hydronephrosis appreciated. few small left parapelvic renal cyst possible, mild 

perirenal stranding inflammatory changes present


Status: Acute   Priority: High   





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


Assessment and Plan: 


* HOLD Aspirin 81mg PO QDaily-->possible coffee ground emesis


* hold Crestor 10 mg PO HS (Patient home Lipitor 20mg not carried in house) 

secondary to vomitting/SBO


* Blood pressure control with Lopressor IV PRN


* PT and OT on board


Status: Chronic   Priority: Medium   





(5) Diabetes mellitus


Assessment and Plan: 


* Accuchecks ACHS


* RISS medium dose


* Consistent carb diet


* HgbA1C: 8.1


* hold Glimepiride 4mg PO QDaily secondary to vomitting/SBO


* hold Januvia 100mg PO QDaily secondary to vomitting/SBO


* HOLD metformin 1000mg PO BID 2/2 mild elevation in lactate


* Lipid panel: T, Chol: 83, LDL<30, HDL: 27


* Hypoglycemic protocol


* Switch to Accuchecks Q6H


* Lower Lantus 9 units (prior 18 units) subqHS to prevent hypoglycemia


Status: Chronic   Priority: Medium   





(6) HTN (hypertension)


Assessment and Plan: 


* Lopressor 5mg IV Q 6H PRN SBP>160


* Hold Metoprolol succinate 50mg PO QDaily secondary to vomitting/SBO


* hold Norvasc 10mg PO QDaily secondary to vomitting/SBO


* HOLD home benazepril 40mg 2/2 to elevated potassium


Status: Chronic   Priority: High   





(7) Lipid disorder


Assessment and Plan: 


* Lipid panel: T, Chol: 83, LDL<30, HDL: 27


* hold Lovaza 1g PO BID secondary to vomitting/SBO


* hold Crestor 10 mg PO HS (Patient home Lipitor 20mg not carried in house) 

secondary to vomitting/SBO


Status: Chronic 





(8) Urinary Tract Infection


       History of Renal Cyst


Assessment and Plan: 


* History of Urinary Incontinence


* Straight cath in ED with 200ml of normal appearing urine given he appeared he 

had difficulty urinating in urinal


* Hold :Oxybutynin XL 5mg PO QDaily secondary to vomitting/SBO


* CT Abdomen/Pelvis (w/o contrast): 11.11 X 10.2cm complex right cystic lesion 

with calcification and septation. nonobstructing left renal calculus. 

Distension of the stomach. Mild proximal small bowel wall thickening. Blood 

loop appear within normal limits of caliber


* Renal US: renal cysts noted


* Resident has confirmed with PMD patient has had renal cyst prior 9cm in --

>18 conversation


* Urine culture (18): no growth


Status: Acute   





(9) Prophylactic measure


Assessment and Plan: 


* Protonix 40mg IV Q12H


* SCDs


* hold Heparin 5000u SC Q8H possible coffee ground emesis


* Florastor 250mg PO BID secondary to vomitting/SBO


* PT/OT eval and treat: recommend for KITTY





Disposition: Patient started on Liquid diet 18. NGT tube removed 18. 

Will continue IV antibiotic to cover for urinary tract infection, pneumonia, 

enteritis. Ordered repeat abdominal xray (official report pending). Will need 

to f/u with general and GI if patient can restart anticoagulation or further 

workup is required.

## 2018-01-06 NOTE — RAD
HISTORY:

prior sbo; resolution  



COMPARISON:

No prior.



FINDINGS:



BOWEL:

No evidence of dilated small bowel loops or air-fluid levels. 



BONES:

Normal.



OTHER FINDINGS:

None.



IMPRESSION:

No evidence of dilated small bowel loops or air-fluid levels.

## 2018-01-07 LAB
ALBUMIN SERPL-MCNC: 3.3 G/DL (ref 3.5–5)
ALBUMIN/GLOB SERPL: 1 {RATIO} (ref 1–2.1)
ALT SERPL-CCNC: 32 U/L (ref 21–72)
AST SERPL-CCNC: 23 U/L (ref 17–59)
BASOPHILS # BLD AUTO: 0.1 K/UL (ref 0–0.2)
BASOPHILS NFR BLD: 0.6 % (ref 0–2)
BUN SERPL-MCNC: 17 MG/DL (ref 9–20)
BUN SERPL-MCNC: 19 MG/DL (ref 9–20)
CALCIUM SERPL-MCNC: 8.1 MG/DL (ref 8.6–10.4)
CALCIUM SERPL-MCNC: 8.4 MG/DL (ref 8.6–10.4)
EOSINOPHIL # BLD AUTO: 0.1 K/UL (ref 0–0.7)
EOSINOPHIL NFR BLD: 0.9 % (ref 0–4)
ERYTHROCYTE [DISTWIDTH] IN BLOOD BY AUTOMATED COUNT: 14.2 % (ref 11.5–14.5)
GFR NON-AFRICAN AMERICAN: > 60
GFR NON-AFRICAN AMERICAN: > 60
HGB BLD-MCNC: 13.2 G/DL (ref 12–18)
LYMPHOCYTES # BLD AUTO: 1.7 K/UL (ref 1–4.3)
LYMPHOCYTES NFR BLD AUTO: 15.6 % (ref 20–40)
MAGNESIUM SERPL-MCNC: 2.1 MG/DL (ref 1.6–2.3)
MCH RBC QN AUTO: 26.8 PG (ref 27–31)
MCHC RBC AUTO-ENTMCNC: 33.2 G/DL (ref 33–37)
MCV RBC AUTO: 80.7 FL (ref 80–94)
MONOCYTES # BLD: 1.4 K/UL (ref 0–0.8)
MONOCYTES NFR BLD: 12.9 % (ref 0–10)
NEUTROPHILS # BLD: 7.6 K/UL (ref 1.8–7)
NEUTROPHILS NFR BLD AUTO: 70 % (ref 50–75)
NRBC BLD AUTO-RTO: 0 % (ref 0–2)
PLATELET # BLD: 201 K/UL (ref 130–400)
PMV BLD AUTO: 9.2 FL (ref 7.2–11.7)
RBC # BLD AUTO: 4.93 MIL/UL (ref 4.4–5.9)
WBC # BLD AUTO: 10.9 K/UL (ref 4.8–10.8)

## 2018-01-07 RX ADMIN — POTASSIUM CHLORIDE SCH MEQ: 1.5 SOLUTION ORAL at 10:40

## 2018-01-07 RX ADMIN — HUMAN INSULIN SCH: 100 INJECTION, SOLUTION SUBCUTANEOUS at 21:37

## 2018-01-07 RX ADMIN — CEFEPIME SCH MLS/HR: 1 INJECTION, SOLUTION INTRAVENOUS at 06:54

## 2018-01-07 RX ADMIN — HUMAN INSULIN SCH UNIT: 100 INJECTION, SOLUTION SUBCUTANEOUS at 13:56

## 2018-01-07 RX ADMIN — POLYETHYLENE GLYCOL 3350 SCH GM: 17 POWDER, FOR SOLUTION ORAL at 10:40

## 2018-01-07 RX ADMIN — OMEGA-3-ACID ETHYL ESTERS SCH GM: 900 CAPSULE, LIQUID FILLED ORAL at 17:15

## 2018-01-07 RX ADMIN — CEFEPIME SCH MLS/HR: 1 INJECTION, SOLUTION INTRAVENOUS at 18:05

## 2018-01-07 RX ADMIN — POTASSIUM CHLORIDE SCH MEQ: 1.5 SOLUTION ORAL at 13:58

## 2018-01-07 RX ADMIN — HUMAN INSULIN SCH UNIT: 100 INJECTION, SOLUTION SUBCUTANEOUS at 17:15

## 2018-01-07 RX ADMIN — METOPROLOL SUCCINATE SCH MG: 50 TABLET, EXTENDED RELEASE ORAL at 10:12

## 2018-01-07 RX ADMIN — Medication SCH MG: at 17:15

## 2018-01-07 RX ADMIN — INSULIN GLARGINE SCH UNITS: 100 INJECTION, SOLUTION SUBCUTANEOUS at 21:33

## 2018-01-07 RX ADMIN — Medication SCH MG: at 10:09

## 2018-01-07 RX ADMIN — PANTOPRAZOLE SODIUM SCH MG: 40 TABLET, DELAYED RELEASE ORAL at 10:20

## 2018-01-07 RX ADMIN — HUMAN INSULIN SCH: 100 INJECTION, SOLUTION SUBCUTANEOUS at 08:30

## 2018-01-07 RX ADMIN — OMEGA-3-ACID ETHYL ESTERS SCH GM: 900 CAPSULE, LIQUID FILLED ORAL at 10:10

## 2018-01-07 RX ADMIN — VANCOMYCIN HYDROCHLORIDE SCH MLS/HR: 1 INJECTION, POWDER, LYOPHILIZED, FOR SOLUTION INTRAVENOUS at 10:44

## 2018-01-07 NOTE — CP.PCM.PN
<Chandrika Villafuerte - Last Filed: 01/07/18 09:31>





Subjective





- Date & Time of Evaluation


Date of Evaluation: 01/07/18


Time of Evaluation: 08:20





- Subjective


Subjective: 


GI Fellow PGY4 Progress Note


Pt seen and evaluated at bedside, pt denies any abdominal pain. +Flatus and no 

BM overnight. NGT out. Pt tolerating clear liquid diet. 


ROS: A 12pt ROS was negative except as above. 








Objective





- Vital Signs/Intake and Output


Vital Signs (last 24 hours): 


 











Temp Pulse Resp BP Pulse Ox


 


 98 F   87   20   154/85 H  96 


 


 01/06/18 23:31  01/06/18 23:31  01/06/18 23:31  01/06/18 23:31  01/06/18 23:31








Intake and Output: 


 











 01/07/18 01/07/18





 06:59 18:59


 


Output Total 1000 


 


Balance -1000 














- Medications


Medications: 


 Current Medications





Acetaminophen (Tylenol 325mg Tab)  650 mg PO Q6 PRN


   PRN Reason: Fever >100.4 F


   Last Admin: 01/04/18 00:17 Dose:  650 mg


Albuterol/Ipratropium (Duoneb 3 Mg/0.5 Mg (3 Ml) Ud)  3 ml INH RQ6 PRN


   PRN Reason: Cough


Amiodarone HCl (Cordarone)  200 mg PO DAILY [facility]


   Last Admin: 01/06/18 09:37 Dose:  Not Given


Amlodipine Besylate (Norvasc)  10 mg PO DAILY [facility]


   Last Admin: 01/04/18 12:26 Dose:  Not Given


Aspirin (Aspirin Chewable)  81 mg PO DAILY [facility]


   Last Admin: 01/04/18 12:27 Dose:  Not Given


Dextrose (Dextrose 50% Inj)  0 ml IV STAT PRN; Protocol


   PRN Reason: Hypoglycemia Protocol


Dextrose (Glutose 15)  0 gm PO ONCE PRN; Protocol


   PRN Reason: Hypoglycemia Protocol


Glucagon (Glucagen Diagnostic Kit)  0 mg IM STAT PRN; Protocol


   PRN Reason: Hypoglycemia Protocol


Heparin Sodium (Porcine) (Heparin)  5,000 units SC Q12 [facility]


   Last Admin: 01/04/18 13:29 Dose:  Not Given


Vancomycin/Sodium Chloride (Vancomycin 1 Gm/Ns 200 Ml)  1 gm in 200 mls @ 166.7 

mls/hr IVPB Q24H [facility]


   Stop: 01/08/18 10:01


   Last Admin: 01/06/18 11:54 Dose:  166.7 mls/hr


Sodium Chloride (Sodium Chloride 0.9%)  1,000 mls @ 75 mls/hr IV .[id number] [facility]


   Last Admin: 01/06/18 22:21 Dose:  75 mls/hr


Cefepime HCl (Maxipime Iv 1 Gm Premix)  1 gm in 50 mls @ 100 mls/hr IVPB Q12H 

[facility]


   Last Admin: 01/07/18 06:54 Dose:  100 mls/hr


Insulin Glargine (Lantus)  9 unit SC [facility]


   Last Admin: 01/06/18 22:18 Dose:  9 units


Insulin Human Regular (Novolin R)  0 unit SC [facility]


   PRN Reason: Protocol


   Last Admin: 01/07/18 08:30 Dose:  Not Given


Metoprolol Succinate (Toprol Xl)  50 mg PO DAILY [facility]


   Last Admin: 01/04/18 12:25 Dose:  Not Given


Metoprolol Tartrate (Lopressor)  5 mg IVP Q6H PRN


   PRN Reason: Systolic Blood Pressure


Omega-3-Acid Ethyl Esters (Lovaza)  1 gm PO BID [facility]


   Last Admin: 01/06/18 18:06 Dose:  1 gm


Ondansetron HCl (Zofran Inj)  4 mg IVP Q6H PRN


   PRN Reason: Nausea/Vomiting


   Last Admin: 01/04/18 03:42 Dose:  4 mg


Oxybutynin Chloride (Ditropan Xl)  5 mg PO DAILY [facility]


   Last Admin: 01/04/18 12:26 Dose:  Not Given


Pantoprazole Sodium (Protonix Ec Tab)  40 mg PO DAILY [facility]


Polyethylene Glycol (Miralax)  17 gm PO DAILY [facility]


Rivaroxaban (Xarelto)  20 mg PO DAILY [facility]


Rosuvastatin Calcium (Crestor)  10 mg PO [facility]


   Last Admin: 01/06/18 22:18 Dose:  10 mg


Saccharomyces Boulardii (Florastor)  250 mg PO BID [facility]


   Last Admin: 01/06/18 18:06 Dose:  250 mg


Sitagliptin Phosphate (Januvia)  50 mg PO DAILY [facility]


   Last Admin: 01/04/18 12:26 Dose:  Not Given











- Labs


Labs: 


 





 01/07/18 08:39 





 01/07/18 08:37 





 











PT  13.4 SECONDS (9.7-12.2)  H  01/04/18  13:46    


 


INR  1.2   01/04/18  13:46    














- Constitutional


Appears: Non-toxic, No Acute Distress





- Head Exam


Head Exam: ATRAUMATIC, NORMAL INSPECTION, NORMOCEPHALIC





- Eye Exam


Eye Exam: EOMI, PERRL


Pupil Exam: PERRL





- ENT Exam


ENT Exam: Mucous Membranes Dry





- Respiratory Exam


Respiratory Exam: NORMAL BREATHING PATTERN





- Cardiovascular Exam


Cardiovascular Exam: Irregular Rhythm





- GI/Abdominal Exam


GI & Abdominal Exam: Soft, Normal Bowel Sounds.  absent: Distended, Guarding, 

Tenderness





- Rectal Exam


Rectal Exam: Deferred





- Extremities Exam


Extremities Exam: Normal Inspection





- Neurological Exam


Neurological Exam: Alert, Awake, Oriented x3





- Psychiatric Exam


Psychiatric exam: Normal Affect, Normal Mood





- Skin


Skin Exam: Dry, Intact, Normal Color, Warm





Assessment and Plan





- Assessment and Plan (Free Text)


Assessment: 


This is a a 77yM presenting with fevers and weakness, found to UTI and 

bacteremia. 


1. SBO


2. Sepsis 


3. UTI


4. Atrial fibrillation on OAC curenlty on hold since 1/4/18


























Plan: 


-Continue supportive care 


- NGT decompression, removed, Abd xray much improved 


- Antibiotic therapy for UTI as per ID


- H/H stable, continue to monitor CBC in am 


- OAC on Hold since 1/4/18 for coffee ground emesis 


- Continue with PPI daily


- Bowel regimen with miralax daily 


- Will advance to full liquid diet today


- Plan for push enteroscopy since proximal bowel obstruction for tomorrow


- Npo after midnight


- Will continue to follow pt closely 











<Jordin Pate - Last Filed: 01/07/18 13:26>





Objective





- Vital Signs/Intake and Output


Vital Signs (last 24 hours): 


 











Temp Pulse Resp BP Pulse Ox


 


 98 F   87   20   154/85 H  96 


 


 01/06/18 23:31  01/06/18 23:31  01/06/18 23:31  01/06/18 23:31  01/06/18 23:31








Intake and Output: 


 











 01/07/18 01/07/18





 06:59 18:59


 


Output Total 1000 


 


Balance -1000 














- Medications


Medications: 


 Current Medications





Acetaminophen (Tylenol 325mg Tab)  650 mg PO Q6 PRN


   PRN Reason: Fever >100.4 F


   Last Admin: 01/04/18 00:17 Dose:  650 mg


Albuterol/Ipratropium (Duoneb 3 Mg/0.5 Mg (3 Ml) Ud)  3 ml INH RQ6 PRN


   PRN Reason: Cough


Amiodarone HCl (Cordarone)  200 mg PO DAILY [facility]


   Last Admin: 01/07/18 10:11 Dose:  200 mg


Amlodipine Besylate (Norvasc)  10 mg PO DAILY [facility]


   Last Admin: 01/07/18 10:11 Dose:  10 mg


Aspirin (Aspirin Chewable)  81 mg PO DAILY [facility]


   Last Admin: 01/07/18 10:09 Dose:  81 mg


Dextrose (Dextrose 50% Inj)  0 ml IV STAT PRN; Protocol


   PRN Reason: Hypoglycemia Protocol


Dextrose (Glutose 15)  0 gm PO ONCE PRN; Protocol


   PRN Reason: Hypoglycemia Protocol


Glucagon (Glucagen Diagnostic Kit)  0 mg IM STAT PRN; Protocol


   PRN Reason: Hypoglycemia Protocol


Heparin Sodium (Porcine) (Heparin)  5,000 units SC Q12 [facility]


   Last Admin: 01/04/18 13:29 Dose:  Not Given


Vancomycin/Sodium Chloride (Vancomycin 1 Gm/Ns 200 Ml)  1 gm in 200 mls @ 166.7 

mls/hr IVPB Q24H [facility]


   Stop: 01/08/18 10:01


   Last Admin: 01/07/18 10:44 Dose:  166.7 mls/hr


Sodium Chloride (Sodium Chloride 0.9%)  1,000 mls @ 75 mls/hr IV .[id number] [facility]


   Last Admin: 01/06/18 22:21 Dose:  75 mls/hr


Cefepime HCl (Maxipime Iv 1 Gm Premix)  1 gm in 50 mls @ 100 mls/hr IVPB Q12H 

[facility]


   Last Admin: 01/07/18 06:54 Dose:  100 mls/hr


Insulin Glargine (Lantus)  9 unit SC HS [facility]


   Last Admin: 01/06/18 22:18 Dose:  9 units


Insulin Human Regular (Novolin R)  0 unit SC [facility]


   PRN Reason: Protocol


   Last Admin: 01/07/18 08:30 Dose:  Not Given


Metoprolol Succinate (Toprol Xl)  50 mg PO DAILY [facility]


   Last Admin: 01/07/18 10:12 Dose:  50 mg


Metoprolol Tartrate (Lopressor)  5 mg IVP Q6H PRN


   PRN Reason: Systolic Blood Pressure


Omega-3-Acid Ethyl Esters (Lovaza)  1 gm PO BID [facility]


   Last Admin: 01/07/18 10:10 Dose:  1 gm


Ondansetron HCl (Zofran Inj)  4 mg IVP Q6H PRN


   PRN Reason: Nausea/Vomiting


   Last Admin: 01/04/18 03:42 Dose:  4 mg


Oxybutynin Chloride (Ditropan Xl)  5 mg PO DAILY [facility]


   Last Admin: 01/07/18 10:10 Dose:  5 mg


Pantoprazole Sodium (Protonix Ec Tab)  40 mg PO DAILY [facility]


   Last Admin: 01/07/18 10:20 Dose:  40 mg


Polyethylene Glycol (Miralax)  17 gm PO DAILY [facility]


   Last Admin: 01/07/18 10:40 Dose:  17 gm


Potassium Chloride (Potassium Chloride Oral Soln)  40 meq PO Q4H [facility]


   Stop: 01/07/18 14:31


   Last Admin: 01/07/18 10:40 Dose:  40 meq


Rivaroxaban (Xarelto)  20 mg PO DAILY [facility]


Rosuvastatin Calcium (Crestor)  10 mg PO HS [facility]


   Last Admin: 01/06/18 22:18 Dose:  10 mg


Saccharomyces Boulardii (Florastor)  250 mg PO BID [facility]


   Last Admin: 01/07/18 10:09 Dose:  250 mg


Sitagliptin Phosphate (Januvia)  50 mg PO DAILY [facility]


   Last Admin: 01/07/18 10:11 Dose:  50 mg











- Labs


Labs: 


 





 01/07/18 08:39 





 01/07/18 08:37 





 











PT  13.4 SECONDS (9.7-12.2)  H  01/04/18  13:46    


 


INR  1.2   01/04/18  13:46    














Attending/Attestation





- Attestation


I have personally seen and examined this patient.: Yes


I have fully participated in the care of the patient.: Yes


I have reviewed all pertinent clinical information, including history, physical 

exam and plan: Yes


Notes (Text): 





01/07/18 13:25


77 year old male with h/o cholecystectomy admitted with UTI, bacteremia, found 

to have small bowel obstruction.  


1. Small bowel obstruction


Plan: 


-tolerated liquid diet


-full liquids today


-recommend Enteroscopy tomorrow to eval for cause of proximal SBO


-npo after MN


-continue ppi

## 2018-01-07 NOTE — CP.PCM.PN
<Ana Nowak - Last Filed: 18 14:16>





Subjective





- Date & Time of Evaluation


Date of Evaluation: 18


Time of Evaluation: 07:00





- Subjective


Subjective: 


PGY1-Medicine Note- Dr. Bird's Service





Patient seen and examined at bedside and in no acute distress. Patient says he 

has not had a bowel movement yet, but is passing gas. Patient is not having any 

nausea or vomiting. Patient was able to tolerate liquids yesterday. Patient has 

complaints. Patient denies chest pain, shortness of breath, or abdominal pain. 





Objective





- Vital Signs/Intake and Output


Vital Signs (last 24 hours): 


 











Temp Pulse Resp BP Pulse Ox


 


 98 F   87   20   154/85 H  96 


 


 18 23:31  18 23:31  18 23:31  18 23:31  18 23:31








Intake and Output: 


 











 18





 06:59 18:59


 


Output Total 1000 


 


Balance -1000 














- Medications


Medications: 


 Current Medications





Acetaminophen (Tylenol 325mg Tab)  650 mg PO Q6 PRN


   PRN Reason: Fever >100.4 F


   Last Admin: 18 00:17 Dose:  650 mg


Albuterol/Ipratropium (Duoneb 3 Mg/0.5 Mg (3 Ml) Ud)  3 ml INH RQ6 PRN


   PRN Reason: Cough


Amiodarone HCl (Cordarone)  200 mg PO DAILY [facility]


   Last Admin: 18 09:37 Dose:  Not Given


Amlodipine Besylate (Norvasc)  10 mg PO DAILY [facility]


   Last Admin: 18 12:26 Dose:  Not Given


Aspirin (Aspirin Chewable)  81 mg PO DAILY [facility]


   Last Admin: 18 12:27 Dose:  Not Given


Dextrose (Dextrose 50% Inj)  0 ml IV STAT PRN; Protocol


   PRN Reason: Hypoglycemia Protocol


Dextrose (Glutose 15)  0 gm PO ONCE PRN; Protocol


   PRN Reason: Hypoglycemia Protocol


Glucagon (Glucagen Diagnostic Kit)  0 mg IM STAT PRN; Protocol


   PRN Reason: Hypoglycemia Protocol


Heparin Sodium (Porcine) (Heparin)  5,000 units SC Q12 [facility]


   Last Admin: 18 13:29 Dose:  Not Given


Vancomycin/Sodium Chloride (Vancomycin 1 Gm/Ns 200 Ml)  1 gm in 200 mls @ 166.7 

mls/hr IVPB Q24H [facility]


   Stop: 18 10:01


   Last Admin: 18 11:54 Dose:  166.7 mls/hr


Sodium Chloride (Sodium Chloride 0.9%)  1,000 mls @ 75 mls/hr IV .[id number] [facility]


   Last Admin: 18 22:21 Dose:  75 mls/hr


Cefepime HCl (Maxipime Iv 1 Gm Premix)  1 gm in 50 mls @ 100 mls/hr IVPB Q12H 

[facility]


   Last Admin: 18 06:54 Dose:  100 mls/hr


Insulin Glargine (Lantus)  9 unit SC [facility]


   Last Admin: 18 22:18 Dose:  9 units


Insulin Human Regular (Novolin R)  0 unit SC ACHS [facility]


   PRN Reason: Protocol


   Last Admin: 18 21:35 Dose:  Not Given


Metoprolol Succinate (Toprol Xl)  50 mg PO DAILY [facility]


   Last Admin: 18 12:25 Dose:  Not Given


Metoprolol Tartrate (Lopressor)  5 mg IVP Q6H PRN


   PRN Reason: Systolic Blood Pressure


Omega-3-Acid Ethyl Esters (Lovaza)  1 gm PO BID [facility]


   Last Admin: 18 18:06 Dose:  1 gm


Ondansetron HCl (Zofran Inj)  4 mg IVP Q6H PRN


   PRN Reason: Nausea/Vomiting


   Last Admin: 18 03:42 Dose:  4 mg


Oxybutynin Chloride (Ditropan Xl)  5 mg PO DAILY [facility]


   Last Admin: 18 12:26 Dose:  Not Given


Pantoprazole Sodium (Protonix Inj)  40 mg IVP Q12H [facility]


   Last Admin: 18 00:30 Dose:  40 mg


Rivaroxaban (Xarelto)  20 mg PO DAILY [facility]


Rosuvastatin Calcium (Crestor)  10 mg PO HS [facility]


   Last Admin: 18 22:18 Dose:  10 mg


Saccharomyces Boulardii (Florastor)  250 mg PO BID [facility]


   Last Admin: 18 18:06 Dose:  250 mg


Sitagliptin Phosphate (Januvia)  50 mg PO DAILY [facility]


   Last Admin: 18 12:26 Dose:  Not Given











- Labs


Labs: 


 





 18 08:39 





 18 07:20 





 











PT  13.4 SECONDS (9.7-12.2)  H  18  13:46    


 


INR  1.2   18  13:46    














- Additional Findings


Additional findings: 





- Constitutional


Appears: Well, No Acute Distress





- Head Exam


Head Exam: ATRAUMATIC, NORMAL INSPECTION





- Eye Exam


Eye Exam: EOMI, Normal appearance





- ENT Exam


ENT Exam: Mucous Membranes Moist





- Respiratory Exam


Respiratory Exam: Decreased Breath Sounds, Rhonchi, NORMAL BREATHING PATTERN.  

absent: Rales, Wheezes





- Cardiovascular Exam


Cardiovascular Exam: REGULAR RHYTHM, +S1, +S2





- GI/Abdominal Exam


GI & Abdominal Exam: Soft, Normal Bowel Sounds.  absent: Distended, Firm, 

Guarding, Rigid, Tenderness





- Rectal Exam


Rectal Exam: Deferred





- Extremities Exam


Additional comments: 





+SCDs





- Neurological Exam


Neurological Exam: Alert, Awake, Oriented x3


Neuro motor strength exam: Left Upper Extremity: 0, Right Upper Extremity: 5, 

Left Lower Extremity: 3, Right Lower Extremity: 5





- Psychiatric Exam


Psychiatric exam: Normal Affect, Normal Mood





- Skin


Skin Exam: Dry, Normal Color








Assessment and Plan





- Assessment and Plan (Free Text)


Assessment: 





(1) Small bowel obstruction, Enteritis, Possible Coffee ground emesis


Assessment and Plan:


* Patient had moderately distended abdomen with episode of coffee ground emesis 

this morning


* CT abd/pelvis: distended stomach, mild proximal small bowel wall thickening, 

correlate clinically for possible enteritis (per discussion with Radiologist)


* General Surgery Consulted, Dr Kulkarni, help appreciated


* NGT was placed on 18, approx 550cc of coffee ground emesis in cannister


* GI consulted, Dr Khoury, help appreciated- Enteritis likely was causing 

transient SBO


* Continue Protonix 40mg IVP


* Diet NPO


* Hgb has been stable 


* Vital signs are stable


* Monitor NGT output


* Continue gentle hydration with NS @ 75cc/hr


* Continue antibiotics


* Abdominal distention has improved


* Continue to monitor bowel function 


* GI consult, Dr. Khoury, help appreciated


* Surgery consult, Dr. Kulkarni, help appreciated


* As per Dr. Pate (GI), enteroscopy tomorrow  to eval for cause of proximal 

SBO


 * NPO after midnight 





(2) Sepsis


Assessment and Plan: 


* Suspecting Urinary tract infection, Pneumonia, Bacteremia


* Code Sepsis 18: 11:40 AM in ED. Criteria - elevated WBC, elevated HR, 

Fever, elevated Lactate. In the ED -> Aztreonam, Vancomycin, NS bolus.


* Patient not given fluid as 30 mg/kg given concern for congestive heart 

failure.


* Infectious Disease on consult, Dr Quinteros, help appreciated 


* Patient is stable, afebrile


* Leukocytosis resolved 


* Antibiotics: Zosyn 2.25 mg IVP Q8H, Vancomycin 1g IVP Q24H


* Tylenol 650mg PO Q6H PRN for fever


* Continue Gentle hydration with NS @ 75 cc/hr


* 18 urine culture negative 


* 1/3/18 sputum culture negative


* 1/3/18 Blood Cx negative x 2 


* 18 Urine Cx growing enterobacter aerogenes


* 18 Blood Cx (10:45am): No growth


* 18 Blood Cx (11:00am): Growing Streptococcus Mitis and Coagulase Negative 

Staphlococcus


* 1/3/18 Blood Cx showing no growth after 48 hours x 2


* Initial Sputum Cx contaminated, f/u repeat sputum cx


* Repeat Urine Cx collected 18 pending 


* Throat culture showing no growth, Flu negative, urine legionella negative


* Mycoplasma IgG 1.84 (high), Mycoplasma IgM 30 (within normal limits)


* Procal 0.80, Lactic acid normalized


* Duonebs PRN shortness of breathe


* Oxygen 3 Liter for nasal cannula


* ID, Dr. Quinteros, consulted- help appreciated


* Imaging:


 * CT chest w/o contrast: No consolidative infiltrate suggested, Mild bibasilar 

peripheral subplerual septal lines noted, Bilateral renal masses, There are at 

least 2 right renal masses that appear hypodense (please see full report)


 * Bladder US: Renal cysts; right kidney 11.3x7.6x10.2cm cyst, 1.3x1.3.1.2cm 

cyst; left kidney 1.3x.10.0.9cm cyst 


 * CXR on admission: No consolidation, cardiomegaly with limited visualization 

of the left costophrenic angle (see full report)


 * CT abd/pelvis: 11.1 x 10.2 cm complex right cystic lesion with evidence of 

calcification and septation. 7mm right upper pole echogenic lesion, 

indeterminate. 17mm low-density, left upper pole kidney 


 * CXR 17: suspect left lower pneumonia, F/U PA/Lateral radiographs 

recommended








(3) History of Atrial fibrillation


Assessment and Plan: 


* Hx of Pacemaker (or AICD)


* CHADS score 5 -> 12.5% risk of event per yr w/o AO; HASBLED 4 -> Patient at 

high risk for major bleeding


* Aspirin 81mg PO QDaily- on hold at this time 


* Metoprolol Succinate 50mg PO QDaily - on hold at this time 


* Amiodarone 200mg PO QDaily - on hold at this time


* TSH and Free T4 within normal limits


* Patient sees Dr Astudillo outpatient


* Per discussion with Dr Dent, patient is supposed to be on Xarelto 20mg PO 

daily


* Cardiology on consult, Dr Chapman, help appreciated





(4) Cardiomegaly


Assessment and Plan: 


* Echo from  showed normal EF and some possible diastolic dysfunction


* Repeat Echo completed, f/u official report


* Per cardio, patient has preserved LVEF with mild mitral regurgitation


* Gentle IV hydration





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


Assessment and Plan: 


* Aspirin 81mg PO QD - on hold at this time


* Crestor 10 mg PO HS - on hold at this time


* Blood pressure control with Lopressor IV PRN


* PT and OT on board 





(6) Diabetes mellitus


Assessment and Plan: 


* Lantus decreased to 9 units HS as patient is currently NPO 


* ISS medium dose, Accuchecks Q6H


* Diet NPO


* HgbA1C 8.1


* Januvia 50mg PO QDaily - on hold at this tme


* HOLD metformin 1000mg PO BID 2/2 mild elevation in lactate


* Hypoglycemia protocol





(7) HTN (hypertension)


Assessment and Plan: 


* BP well controlled


* Metoprolol succinate 50mg PO QDaily - on hold at this time


* Norvasc 10mg PO QDaily - on hold at this time


* Lopressor 5mg IVP Q6H prn SBP > 160





(8) Lipid disorder


Assessment and Plan: 


* Lipid panel- T, Chol: 83, LDL<30, HDL: 27


* Lovaza 1g PO BID - on hold at this time


* Crestor 10 mg PO HS - on hold at this time





(9) Abnormal urinalysis


Assessment and Plan: 


* History of Urinary Incontinence


* Straight cath in ED with 200ml of normal appearing urine given he appeared he 

had difficulty urinating in urinal


* Bladder scan PRN


* Urine culture growing enterobacter aerogenes, sensitivities reviewed


* Repeat UA 18: +1 ketones, +1 blood, +2 Leuk esterase, 24 WBCs


* Repeat Urine culture pending 


* Contiue Oxybutynin XL 5mg PO QDaily - on hold 


* CT Abdomen/Pelvis (w/o contrast): 11.11 X 10.2cm complex right cystic lesion 

with calcification and septation. nonobstructing left renal calculus. 

Distension of the stomach. Mild proximal small bowel wall thickening. Blood 

loop appear within normal limits of caliber


* Per PMD, in  patient had right renal cyst that measured 9cm


* Urology on consult, Dr Bedner, help appreciated 





(10) Hypokalemia


Assessment and Plan: 


* Potassium 3.2


* Repleted, will continue to monitor





(10) Prophylactic measure


Assessment and Plan: 


* Protonix 40mg IV Q12H


* SCDs


* hold Heparin 5000u SC Q8H possible coffee ground emesis


* Florastor 250mg PO BID secondary to vomitting/SBO


* PT/OT eval and treat: recommend for KITTY














<Juliann Bird V - Last Filed: 18 17:58>





Objective





- Vital Signs/Intake and Output


Vital Signs (last 24 hours): 


 











Temp Pulse Resp BP Pulse Ox


 


 98 F   87   20   154/85 H  96 


 


 18 23:31  18 23:31  18 23:31  18 23:31  18 23:31








Intake and Output: 


 











 18





 06:59 18:59


 


Output Total 1000 


 


Balance -1000 














- Medications


Medications: 


 Current Medications





Acetaminophen (Tylenol 325mg Tab)  650 mg PO Q6 PRN


   PRN Reason: Fever >100.4 F


   Last Admin: 18 00:17 Dose:  650 mg


Albuterol/Ipratropium (Duoneb 3 Mg/0.5 Mg (3 Ml) Ud)  3 ml INH RQ6 PRN


   PRN Reason: Cough


Amiodarone HCl (Cordarone)  200 mg PO DAILY [facility]


   Last Admin: 18 10:11 Dose:  200 mg


Amlodipine Besylate (Norvasc)  10 mg PO DAILY [facility]


   Last Admin: 18 10:11 Dose:  10 mg


Aspirin (Aspirin Chewable)  81 mg PO DAILY [facility]


   Last Admin: 18 10:09 Dose:  81 mg


Dextrose (Dextrose 50% Inj)  0 ml IV STAT PRN; Protocol


   PRN Reason: Hypoglycemia Protocol


Dextrose (Glutose 15)  0 gm PO ONCE PRN; Protocol


   PRN Reason: Hypoglycemia Protocol


Glucagon (Glucagen Diagnostic Kit)  0 mg IM STAT PRN; Protocol


   PRN Reason: Hypoglycemia Protocol


Heparin Sodium (Porcine) (Heparin)  5,000 units SC Q12 [facility]


   Last Admin: 18 13:29 Dose:  Not Given


Vancomycin/Sodium Chloride (Vancomycin 1 Gm/Ns 200 Ml)  1 gm in 200 mls @ 166.7 

mls/hr IVPB Q24H [facility]


   Stop: 18 10:01


   Last Admin: 18 10:44 Dose:  166.7 mls/hr


Sodium Chloride (Sodium Chloride 0.9%)  1,000 mls @ 75 mls/hr IV .[id number] [facility]


   Last Admin: 18 22:21 Dose:  75 mls/hr


Cefepime HCl (Maxipime Iv 1 Gm Premix)  1 gm in 50 mls @ 100 mls/hr IVPB Q12H 

[facility]


   Last Admin: 18 06:54 Dose:  100 mls/hr


Insulin Glargine (Lantus)  9 unit SC HS [facility]


   Last Admin: 18 22:18 Dose:  9 units


Insulin Human Regular (Novolin R)  0 unit SC ACHS [facility]


   PRN Reason: Protocol


   Last Admin: 18 08:30 Dose:  Not Given


Metoprolol Succinate (Toprol Xl)  50 mg PO DAILY [facility]


   Last Admin: 18 10:12 Dose:  50 mg


Metoprolol Tartrate (Lopressor)  5 mg IVP Q6H PRN


   PRN Reason: Systolic Blood Pressure


Omega-3-Acid Ethyl Esters (Lovaza)  1 gm PO BID [facility]


   Last Admin: 18 10:10 Dose:  1 gm


Ondansetron HCl (Zofran Inj)  4 mg IVP Q6H [facility]N


   PRN Reason: Nausea/Vomiting


   Last Admin: 18 03:42 Dose:  4 mg


Oxybutynin Chloride (Ditropan Xl)  5 mg PO DAILY [facility]


   Last Admin: 18 10:10 Dose:  5 mg


Pantoprazole Sodium (Protonix Ec Tab)  40 mg PO DAILY [facility]


   Last Admin: 18 10:20 Dose:  40 mg


Polyethylene Glycol (Miralax)  17 gm PO DAILY [facility]


   Last Admin: 18 10:40 Dose:  17 gm


Potassium Chloride (Potassium Chloride Oral Soln)  40 meq PO Q4H [facility]


   Stop: 18 14:31


   Last Admin: 18 10:40 Dose:  40 meq


Rivaroxaban (Xarelto)  20 mg PO DAILY [facility]


Rosuvastatin Calcium (Crestor)  10 mg PO HS [facility]


   Last Admin: 18 22:18 Dose:  10 mg


Saccharomyces Boulardii (Florastor)  250 mg PO BID [facility]


   Last Admin: 18 10:09 Dose:  250 mg


Sitagliptin Phosphate (Januvia)  50 mg PO DAILY [facility]


   Last Admin: 18 10:11 Dose:  50 mg











- Labs


Labs: 


 





 18 08:39 





 18 08:37 





 











PT  13.4 SECONDS (9.7-12.2)  H  18  13:46    


 


INR  1.2   18  13:46    














Attending/Attestation





- Attestation


I have personally seen and examined this patient.: Yes


I have fully participated in the care of the patient.: Yes


I have reviewed all pertinent clinical information, including history, physical 

exam and plan: Yes


Notes (Text): 


Patient seen, examined, and case discussed with day-time resident.


Patient seen this afternoon. Patient reports he is feeling better. Patient 

reports he has flatus but has not had a bowel movement.


Patient is on liquid diet. On the telemetry monitor: patient is in 90s while on 

Lopressor IV PRN.


Will need to follow-up with GI in regards to possible endoscopy given there was 

a question to coffee ground emesis on 18.


Per surgery, no plans for surgery intervention.


Patient is afebrile since 1/3/18 and has mild white count of 10.6


Electrolytes repleted today


Offical report on Abdominal xray: No evidence of dilated small bowel loops or 

air-fluid levels.


Echocardiogram has not read officially.


Patient is on Cefepime 1gram IV Q12H (active since 18) to cover for UTI, 

bacteremia, and enteritis.





Assessment/Plan


(1) Sepsis


Assessment and Plan: 


* Suspecting:urinary tract infection, possible bacteremia, pneumonia


* Code Sepsis: 11:40 AM on 18 in ED. Criteria - elevated WBC, elevated HR, 

Fever, elevated Lactate. In the ED -> Aztreonam, Vancomycin, NS bolus.


* Patient not given fluid as 30 mg/kg given concern for congestive heart 

failure.


* Infectious Disease consult, Dr Quinteros on board


 * D/c Zosyn and Vanco


* Pending official read of echocardiogram


* Flu NEGATIVE


* Blood culture (18 11:00): Strepococcus Mitis and Coag Neg Staph sensitive 

to


* Blood culture (18 10:45): no growth after 4 days


* Blood culture (1/3/18): No growth X2





* Urine culture (18): Enterobacter Aerogenes-->HANNAH for Cefepime, Invanz, 

and Ciprofloxocin


* Urine culture (18): no growth





* Negative for strep throat


* Sputum (1/3/17): contaminated


* F/U strep pnumoniae Ag, mycoplasma IgM, legionella Ag: negative


* Procalcitonin: 0.80


 Imaging:


 * CT chest w/o contrast (18): no consolidative infiltrate suggested, mild 

bibasilar peripheral subpleural septal lines. b/l renal masses. no gross 

hydronephrosis appreciated. few small left parapelvic renal cyst possible, mild 

perirenal stranding inflammatory changes present


 * Chest xray (18): suspected left lower lobe pneumonia.


 


* Duonebs PRN shortness of breathe


* Oxygen 3 Liter for nasal cannula


* Cefepime 1gram IVPB Q12 (active since 18)





Meds:


Tylenol 650mg PO Q6H PRN for fever





Status: Acute   Priority: High   





(2) Small Bowel Obstruction


       Enteritis


       Possible coffee ground emesis


Assessment and Plan: 


* General surgery (Dr. Kulkarni) on board-->help appreciated


* GI (Dr. Medina) on board-->help appreciated


* NGT placed 18 550 abdominal contents question of coffee ground emesis 

removed. Abdomen exam improved.


* CT Abdomen/Pelvis (w/o contrast): 11.11 X 10.2cm complex right cystic lesion 

with calcification and septation. nonobstructing left renal calculus. 

Distension of the stomach. Mild proximal small bowel wall thickening. Blood 

loop appear within normal limits of caliber


* Aspirin, Xarelto held in light of possible coffee ground emesis 18


* Medications switched to IV if possible


* Patient reports flatus. Has not had bowel movement. NGT tube removed on 


* Cefepime 1gram IV Q12H (active since 18)





Status: Acute   Priority: High 





(3) History of Atrial fibrillation


Assessment and Plan: 


* Hx of Pacemaker (or AICD)


* Cardiology consult, Dr Chapman


* CHADS 5 -> 12.5% risk of event per yr w/o AO; HASBLED 4 -> Patient at high 

risk for major bleeding


* HOLD Aspirin 81mg PO QDaily-->possible coffee ground emesis


* HOLD Metoprolol Succinate 50mg PO QDaily secondary to vomitting/SBO-->

switched to Lopressor 5mg IV Q 6H given if SBP>160


* Hold Increased Amiodarone 200mg PO QDaily  secondary to vomitting/SBO


* TSH/Free T4


* Resident has spoken with patient's family physician: chemical anticoagulation 

had be discontinued secondary to falls on 18


Status: Chronic   Priority: High   





(4) Cardiomegaly


Assessment and Plan: 


* Echo from 2015 showed normal EF and some possible diastolic dysfunction


* F/U Echo--Pending official read; cardiology on call noted normal V function, 

and mild MR


* Gentle IV hydration


* CT chest w/o contrast (18): no consolidative infiltrate suggested, mild 

bibasilar peripheral subpleural septal lines. b/l renal masses. no gross 

hydronephrosis appreciated. few small left parapelvic renal cyst possible, mild 

perirenal stranding inflammatory changes present


Status: Acute   Priority: High   





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


Assessment and Plan: 


* HOLD Aspirin 81mg PO QDaily-->possible coffee ground emesis


* hold Crestor 10 mg PO HS (Patient home Lipitor 20mg not carried in house) 

secondary to vomitting/SBO


* Blood pressure control with Lopressor IV PRN


* PT and OT on board


Status: Chronic   Priority: Medium   





(5) Diabetes mellitus


Assessment and Plan: 


* Accuchecks ACHS


* RISS medium dose


* Consistent carb diet


* HgbA1C: 8.1


* hold Glimepiride 4mg PO QDaily secondary to vomitting/SBO


* hold Januvia 100mg PO QDaily secondary to vomitting/SBO


* HOLD metformin 1000mg PO BID 2/2 mild elevation in lactate


* Lipid panel: T, Chol: 83, LDL<30, HDL: 27


* Hypoglycemic protocol


* Switch to Accuchecks Q6H


* Lower Lantus 9 units (prior 18 units) subqHS to prevent hypoglycemia


Status: Chronic   Priority: Medium   





(6) HTN (hypertension)


Assessment and Plan: 


* Lopressor 5mg IV Q 6H PRN SBP>160


* Hold Metoprolol succinate 50mg PO QDaily secondary to vomitting/SBO


* hold Norvasc 10mg PO QDaily secondary to vomitting/SBO


* HOLD home benazepril 40mg 2/2 to elevated potassium


Status: Chronic   Priority: High   





(7) Lipid disorder


Assessment and Plan: 


* Lipid panel: T, Chol: 83, LDL<30, HDL: 27


* hold Lovaza 1g PO BID secondary to vomitting/SBO


* hold Crestor 10 mg PO HS (Patient home Lipitor 20mg not carried in house) 

secondary to vomitting/SBO


Status: Chronic 





(8) Urinary Tract Infection


       History of Renal Cyst


Assessment and Plan: 


* History of Urinary Incontinence


* Straight cath in ED with 200ml of normal appearing urine given he appeared he 

had difficulty urinating in urinal


* Hold :Oxybutynin XL 5mg PO QDaily secondary to vomitting/SBO


* CT Abdomen/Pelvis (w/o contrast): 11.11 X 10.2cm complex right cystic lesion 

with calcification and septation. nonobstructing left renal calculus. 

Distension of the stomach. Mild proximal small bowel wall thickening. Blood 

loop appear within normal limits of caliber


* Renal US: renal cysts noted


* Resident has confirmed with PMD patient has had renal cyst prior 9cm in --

>18 conversation


* Urine culture (18): no growth


Status: Acute   





(9) Prophylactic measure


Assessment and Plan: 


* Protonix 40mg IV Q12H


* SCDs


* hold Heparin 5000u SC Q8H possible coffee ground emesis


* Florastor 250mg PO BID secondary to vomitting/SBO


* PT/OT eval and treat: recommend for KITTY





Disposition: Patient started on Liquid diet 18. NGT tube removed 18. 

Will continue IV antibiotic to cover for urinary tract infection, pneumonia, 

enteritis. Will f/u GI to determine endoscopy given possible coffee ground 

emesis prior to restart anticoagulation.

## 2018-01-07 NOTE — CP.PCM.PN
Subjective





- Date & Time of Evaluation


Date of Evaluation: 01/07/18


Time of Evaluation: 07:40





- Subjective


Subjective: 


General Surgery


Dr. Kulkarni





Pt S&E @bedside. NAEO. NGT removed yesterday and pt started on CLD. Pt 

tolerated well. No N/V per pt and nursing. (+)Flatus (-)BM





Objective





- Vital Signs/Intake and Output


Vital Signs (last 24 hours): 


 











Temp Pulse Resp BP Pulse Ox


 


 98 F   87   20   154/85 H  96 


 


 01/06/18 23:31  01/06/18 23:31  01/06/18 23:31  01/06/18 23:31  01/06/18 23:31








Intake and Output: 


 











 01/07/18 01/07/18





 06:59 18:59


 


Output Total 1000 


 


Balance -1000 














- Medications


Medications: 


 Current Medications





Acetaminophen (Tylenol 325mg Tab)  650 mg PO Q6 PRN


   PRN Reason: Fever >100.4 F


   Last Admin: 01/04/18 00:17 Dose:  650 mg


Albuterol/Ipratropium (Duoneb 3 Mg/0.5 Mg (3 Ml) Ud)  3 ml INH RQ6 PRN


   PRN Reason: Cough


Amiodarone HCl (Cordarone)  200 mg PO DAILY [facility]


   Last Admin: 01/06/18 09:37 Dose:  Not Given


Amlodipine Besylate (Norvasc)  10 mg PO DAILY [facility]


   Last Admin: 01/04/18 12:26 Dose:  Not Given


Aspirin (Aspirin Chewable)  81 mg PO DAILY [facility]


   Last Admin: 01/04/18 12:27 Dose:  Not Given


Dextrose (Dextrose 50% Inj)  0 ml IV STAT PRN; Protocol


   PRN Reason: Hypoglycemia Protocol


Dextrose (Glutose 15)  0 gm PO ONCE PRN; Protocol


   PRN Reason: Hypoglycemia Protocol


Glucagon (Glucagen Diagnostic Kit)  0 mg IM STAT PRN; Protocol


   PRN Reason: Hypoglycemia Protocol


Heparin Sodium (Porcine) (Heparin)  5,000 units SC Q12 [facility]


   Last Admin: 01/04/18 13:29 Dose:  Not Given


Vancomycin/Sodium Chloride (Vancomycin 1 Gm/Ns 200 Ml)  1 gm in 200 mls @ 166.7 

mls/hr IVPB Q24H [facility]


   Stop: 01/08/18 10:01


   Last Admin: 01/06/18 11:54 Dose:  166.7 mls/hr


Sodium Chloride (Sodium Chloride 0.9%)  1,000 mls @ 75 mls/hr IV .[id number] [facility]


   Last Admin: 01/06/18 22:21 Dose:  75 mls/hr


Cefepime HCl (Maxipime Iv 1 Gm Premix)  1 gm in 50 mls @ 100 mls/hr IVPB Q12H 

[facility]


   Last Admin: 01/07/18 06:54 Dose:  100 mls/hr


Potassium Chloride (Potassium Chloride 10 Meq/100 Ml)  10 meq in 100 mls @ 100 

mls/hr IVPB Q2H [facility]


   Stop: 01/07/18 12:14


Insulin Glargine (Lantus)  9 unit SC [facility]


   Last Admin: 01/06/18 22:18 Dose:  9 units


Insulin Human Regular (Novolin R)  [facility]


   PRN Reason: Protocol


   Last Admin: 01/06/18 21:35 Dose:  Not Given


Metoprolol Succinate (Toprol Xl)  50 mg PO DAILY [facility]


   Last Admin: 01/04/18 12:25 Dose:  Not Given


Metoprolol Tartrate (Lopressor)  5 mg IVP Q6H PRN


   PRN Reason: Systolic Blood Pressure


Omega-3-Acid Ethyl Esters (Lovaza)  1 gm PO BID [facility]


   Last Admin: 01/06/18 18:06 Dose:  1 gm


Ondansetron HCl (Zofran Inj)  4 mg IVP Q6H PRN


   PRN Reason: Nausea/Vomiting


   Last Admin: 01/04/18 03:42 Dose:  4 mg


Oxybutynin Chloride (Ditropan Xl)  5 mg PO DAILY [facility]


   Last Admin: 01/04/18 12:26 Dose:  Not Given


Pantoprazole Sodium (Protonix Ec Tab)  40 mg PO DAILY [facility]


Rivaroxaban (Xarelto)  20 mg PO DAILY [facility]


Rosuvastatin Calcium (Crestor)  10 mg PO [facility]


   Last Admin: 01/06/18 22:18 Dose:  10 mg


Saccharomyces Boulardii (Florastor)  250 mg PO BID [facility]


   Last Admin: 01/06/18 18:06 Dose:  250 mg


Sitagliptin Phosphate (Januvia)  50 mg PO DAILY [facility]


   Last Admin: 01/04/18 12:26 Dose:  Not Given











- Labs


Labs: 


 





 01/07/18 08:39 





 01/07/18 08:37 





 











PT  13.4 SECONDS (9.7-12.2)  H  01/04/18  13:46    


 


INR  1.2   01/04/18  13:46    














- Constitutional


Appears: Non-toxic, No Acute Distress





- Head Exam


Head Exam: NORMAL INSPECTION





- Eye Exam


Eye Exam: Normal appearance





- ENT Exam


ENT Exam: Mucous Membranes Moist





- Respiratory Exam


Respiratory Exam: NORMAL BREATHING PATTERN.  absent: Accessory Muscle Use, 

Respiratory Distress





- GI/Abdominal Exam


GI & Abdominal Exam: Soft.  absent: Distended, Firm, Guarding, Tenderness, 

Rebound





- Neurological Exam


Neurological Exam: Alert, Awake





- Psychiatric Exam


Psychiatric exam: Normal Affect, Normal Mood





- Skin


Skin Exam: Dry, Intact, Normal Color, Warm





Assessment and Plan





- Assessment and Plan (Free Text)


Assessment: 





78 y/o M w/ Ileus vs SBO - improving.





- possible EGD per GI; f/u recs


- if no scope planned, advance to FLD


- monitor bowel fxn


- cont medical management


- no plans for surgery at this time





Pt discussed w/ Dr. Shad Arceo DO PGY2

## 2018-01-08 PROCEDURE — 0DB88ZX EXCISION OF SMALL INTESTINE, VIA NATURAL OR ARTIFICIAL OPENING ENDOSCOPIC, DIAGNOSTIC: ICD-10-PCS | Performed by: INTERNAL MEDICINE

## 2018-01-08 RX ADMIN — Medication SCH MG: at 11:16

## 2018-01-08 RX ADMIN — INSULIN GLARGINE SCH UNITS: 100 INJECTION, SOLUTION SUBCUTANEOUS at 21:27

## 2018-01-08 RX ADMIN — HUMAN INSULIN SCH: 100 INJECTION, SOLUTION SUBCUTANEOUS at 08:05

## 2018-01-08 RX ADMIN — OMEGA-3-ACID ETHYL ESTERS SCH GM: 900 CAPSULE, LIQUID FILLED ORAL at 11:28

## 2018-01-08 RX ADMIN — PANTOPRAZOLE SODIUM SCH MG: 40 TABLET, DELAYED RELEASE ORAL at 17:58

## 2018-01-08 RX ADMIN — HUMAN INSULIN SCH UNIT: 100 INJECTION, SOLUTION SUBCUTANEOUS at 17:10

## 2018-01-08 RX ADMIN — Medication SCH MG: at 18:34

## 2018-01-08 RX ADMIN — METOPROLOL SUCCINATE SCH MG: 50 TABLET, EXTENDED RELEASE ORAL at 11:16

## 2018-01-08 RX ADMIN — CEFEPIME SCH MLS/HR: 1 INJECTION, SOLUTION INTRAVENOUS at 18:42

## 2018-01-08 RX ADMIN — OMEGA-3-ACID ETHYL ESTERS SCH GM: 900 CAPSULE, LIQUID FILLED ORAL at 18:34

## 2018-01-08 RX ADMIN — HUMAN INSULIN SCH: 100 INJECTION, SOLUTION SUBCUTANEOUS at 21:24

## 2018-01-08 RX ADMIN — POLYETHYLENE GLYCOL 3350 SCH GM: 17 POWDER, FOR SOLUTION ORAL at 11:15

## 2018-01-08 RX ADMIN — PANTOPRAZOLE SODIUM SCH MG: 40 TABLET, DELAYED RELEASE ORAL at 11:16

## 2018-01-08 RX ADMIN — HUMAN INSULIN SCH UNIT: 100 INJECTION, SOLUTION SUBCUTANEOUS at 12:35

## 2018-01-08 RX ADMIN — VANCOMYCIN HYDROCHLORIDE SCH MLS/HR: 1 INJECTION, POWDER, LYOPHILIZED, FOR SOLUTION INTRAVENOUS at 11:40

## 2018-01-08 RX ADMIN — CEFEPIME SCH MLS/HR: 1 INJECTION, SOLUTION INTRAVENOUS at 06:01

## 2018-01-08 NOTE — CP.PCM.PN
Subjective





- Date & Time of Evaluation


Date of Evaluation: 01/08/18


Time of Evaluation: 04:00





- Subjective


Subjective: 





dictated





Objective





- Vital Signs/Intake and Output


Vital Signs (last 24 hours): 


 











Temp Pulse Resp BP Pulse Ox


 


 98.7 F   88   20   160/77 H  95 


 


 01/08/18 15:35  01/08/18 15:35  01/08/18 15:35  01/08/18 15:35  01/08/18 15:35








Intake and Output: 


 











 01/08/18 01/09/18





 18:59 06:59


 


Intake Total 1380 


 


Output Total 1000 


 


Balance 380 














- Medications


Medications: 


 Current Medications





Acetaminophen (Tylenol 325mg Tab)  650 mg PO Q6 PRN


   PRN Reason: Fever >100.4 F


   Last Admin: 01/04/18 00:17 Dose:  650 mg


Albuterol/Ipratropium (Duoneb 3 Mg/0.5 Mg (3 Ml) Ud)  3 ml INH RQ6 PRN


   PRN Reason: Cough


Amiodarone HCl (Cordarone)  200 mg PO DAILY [facility]


   Last Admin: 01/08/18 11:16 Dose:  200 mg


Amlodipine Besylate (Norvasc)  10 mg PO DAILY [facility]


   Last Admin: 01/08/18 11:16 Dose:  10 mg


Aspirin (Aspirin Chewable)  81 mg PO DAILY [facility]


   Last Admin: 01/08/18 11:16 Dose:  81 mg


Dextrose (Dextrose 50% Inj)  0 ml IV STAT PRN; Protocol


   PRN Reason: Hypoglycemia Protocol


Dextrose (Glutose 15)  0 gm PO ONCE PRN; Protocol


   PRN Reason: Hypoglycemia Protocol


Glucagon (Glucagen Diagnostic Kit)  0 mg IM STAT PRN; Protocol


   PRN Reason: Hypoglycemia Protocol


Heparin Sodium (Porcine) (Heparin)  5,000 units SC Q12 [facility]


   Last Admin: 01/04/18 13:29 Dose:  Not Given


Sodium Chloride (Sodium Chloride 0.9%)  1,000 mls @ 75 mls/hr IV .[id number] [facility]


   Last Admin: 01/07/18 19:21 Dose:  Not Given


Cefepime HCl (Maxipime Iv 1 Gm Premix)  1 gm in 50 mls @ 100 mls/hr IVPB Q12H 

[facility]


   Last Admin: 01/08/18 18:42 Dose:  100 mls/hr


Insulin Glargine (Lantus)  9 unit SC HS [facility]


   Last Admin: 01/07/18 21:33 Dose:  9 units


Insulin Human Regular (Novolin R)  0 unit SC [facility]


   PRN Reason: Protocol


   Last Admin: 01/08/18 17:10 Dose:  4 unit


Metoprolol Succinate (Toprol Xl)  50 mg PO DAILY [facility]


   Last Admin: 01/08/18 11:16 Dose:  50 mg


Metoprolol Succinate (Toprol Xl)  25 mg PO DAILY [facility]


Metoprolol Tartrate (Lopressor)  5 mg IVP Q6H PRN


   PRN Reason: Systolic Blood Pressure


Omega-3-Acid Ethyl Esters (Lovaza)  1 gm PO BID [facility]


   Last Admin: 01/08/18 18:34 Dose:  1 gm


Ondansetron HCl (Zofran Inj)  4 mg IVP Q6H PRN


   PRN Reason: Nausea/Vomiting


   Last Admin: 01/04/18 03:42 Dose:  4 mg


Oxybutynin Chloride (Ditropan Xl)  5 mg PO DAILY [facility]


   Last Admin: 01/08/18 11:15 Dose:  5 mg


Pantoprazole Sodium (Protonix Ec Tab)  40 mg PO BID [facility]


   Last Admin: 01/08/18 17:58 Dose:  40 mg


Polyethylene Glycol (Miralax)  17 gm PO DAILY [facility]


   Last Admin: 01/08/18 11:15 Dose:  17 gm


Rivaroxaban (Xarelto)  20 mg PO DAILY [facility]


Rosuvastatin Calcium (Crestor)  10 mg PO HS [facility]


   Last Admin: 01/07/18 21:33 Dose:  10 mg


Saccharomyces Boulardii (Florastor)  250 mg PO BID [facility]


   Last Admin: 01/08/18 18:34 Dose:  250 mg


Sitagliptin Phosphate (Januvia)  50 mg PO DAILY [facility]


   Last Admin: 01/08/18 11:23 Dose:  Not Given











- Labs


Labs: 


 





 01/07/18 08:39 





 01/07/18 17:53 





 











PT  13.4 SECONDS (9.7-12.2)  H  01/04/18  13:46    


 


INR  1.2   01/04/18  13:46

## 2018-01-08 NOTE — CP.PCM.CON
History of Present Illness





- History of Present Illness


History of Present Illness: 





Full note to be dictated





Imp:


fever, weakness, hypertension, DM





Thank you





YS





Past Patient History





- Past Medical History & Family History


Past Medical History?: Yes





- Past Social History


Smoking Status: Former Smoker





- CARDIAC


Hx Cardiac Disorders: Yes (ABrandie)


Hx Hypercholesterolemia: Yes


Hx Hypertension: Yes





- PULMONARY


Hx Respiratory Disorders: No





- NEUROLOGICAL


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





- HEENT


Hx HEENT Problems: No





- RENAL


Hx Chronic Kidney Disease: No





- ENDOCRINE/METABOLIC


Hx Diabetes Mellitus Type 2: Yes





- HEMATOLOGICAL/ONCOLOGICAL


Hx Blood Disorders: No





- INTEGUMENTARY


Hx Dermatological Problems: No





- MUSCULOSKELETAL/RHEUMATOLOGICAL


Hx Falls: Yes





- GASTROINTESTINAL


Hx Gastrointestinal Disorders: No





- GENITOURINARY/GYNECOLOGICAL


Hx Genitourinary Disorders: No





- PSYCHIATRIC


Hx Substance Use: No





- SURGICAL HISTORY


Hx Surgeries: Yes


Hx Cholecystectomy: Yes





- ANESTHESIA


Hx Anesthesia: Yes


Hx Anesthesia Reactions: No


Hx Malignant Hyperthermia: No





Meds


Allergies/Adverse Reactions: 


 Allergies











Allergy/AdvReac Type Severity Reaction Status Date / Time


 


No Known Allergies Allergy   Verified 01/02/18 10:33














- Medications


Medications: 


 Current Medications





Acetaminophen (Tylenol 325mg Tab)  650 mg PO Q6 PRN


   PRN Reason: Fever >100.4 F


   Last Admin: 01/04/18 00:17 Dose:  650 mg


Albuterol/Ipratropium (Duoneb 3 Mg/0.5 Mg (3 Ml) Ud)  3 ml INH RQ6 PRN


   PRN Reason: Cough


Amiodarone HCl (Cordarone)  200 mg PO DAILY [facility]


   Last Admin: 01/08/18 11:16 Dose:  200 mg


Amlodipine Besylate (Norvasc)  10 mg PO DAILY [facility]


   Last Admin: 01/08/18 11:16 Dose:  10 mg


Aspirin (Aspirin Chewable)  81 mg PO DAILY [facility]


   Last Admin: 01/08/18 11:16 Dose:  81 mg


Dextrose (Dextrose 50% Inj)  0 ml IV STAT PRN; Protocol


   PRN Reason: Hypoglycemia Protocol


Dextrose (Glutose 15)  0 gm PO ONCE PRN; Protocol


   PRN Reason: Hypoglycemia Protocol


Glucagon (Glucagen Diagnostic Kit)  0 mg IM STAT PRN; Protocol


   PRN Reason: Hypoglycemia Protocol


Heparin Sodium (Porcine) (Heparin)  5,000 units SC Q12 [facility]


   Last Admin: 01/04/18 13:29 Dose:  Not Given


Sodium Chloride (Sodium Chloride 0.9%)  1,000 mls @ 75 mls/hr IV .[id number] [facility]


   Last Admin: 01/07/18 19:21 Dose:  Not Given


Cefepime HCl (Maxipime Iv 1 Gm Premix)  1 gm in 50 mls @ 100 mls/hr IVPB Q12H 

[facility]


   Last Admin: 01/08/18 06:01 Dose:  100 mls/hr


Insulin Glargine (Lantus)  9 unit SC [facility]


   Last Admin: 01/07/18 21:33 Dose:  9 units


Insulin Human Regular (Novolin R)  0 unit SC [facility]


   PRN Reason: Protocol


   Last Admin: 01/08/18 08:05 Dose:  Not Given


Metoprolol Succinate (Toprol Xl)  50 mg PO DAILY [facility]


   Last Admin: 01/08/18 11:16 Dose:  50 mg


Metoprolol Tartrate (Lopressor)  5 mg IVP Q6H PRN


   PRN Reason: Systolic Blood Pressure


Omega-3-Acid Ethyl Esters (Lovaza)  1 gm PO BID [facility]


   Last Admin: 01/07/18 17:15 Dose:  1 gm


Ondansetron HCl (Zofran Inj)  4 mg IVP Q6H [facility]


   PRN Reason: Nausea/Vomiting


   Last Admin: 01/04/18 03:42 Dose:  4 mg


Oxybutynin Chloride (Ditropan Xl)  5 mg PO DAILY [facility]


   Last Admin: 01/08/18 11:15 Dose:  5 mg


Pantoprazole Sodium (Protonix Ec Tab)  40 mg PO DAILY [facility]


   Last Admin: 01/08/18 11:16 Dose:  40 mg


Polyethylene Glycol (Miralax)  17 gm PO DAILY [facility]


   Last Admin: 01/08/18 11:15 Dose:  17 gm


Rivaroxaban (Xarelto)  20 mg PO DAILY [facility]


Rosuvastatin Calcium (Crestor)  10 mg PO [facility]


   Last Admin: 01/07/18 21:33 Dose:  10 mg


Saccharomyces Boulardii (Florastor)  250 mg PO BID [facility]


   Last Admin: 01/08/18 11:16 Dose:  250 mg


Sitagliptin Phosphate (Januvia)  50 mg PO DAILY [facility]


   Last Admin: 01/07/18 10:11 Dose:  50 mg











Results





- Vital Signs


Recent Vital Signs: 


 Last Vital Signs











Temp  98.9 F   01/08/18 10:50


 


Pulse  79   01/08/18 10:50


 


Resp  20   01/08/18 10:50


 


BP  162/80 H  01/08/18 10:50


 


Pulse Ox  94 L  01/08/18 10:50














- Labs


Result Diagrams: 


 01/07/18 08:39





 01/07/18 17:53


Labs: 


 Laboratory Results - last 24 hr











  01/07/18 01/07/18 01/07/18





  11:23 16:36 17:53


 


Sodium    142


 


Potassium    3.9


 


Chloride    108 H


 


Carbon Dioxide    27


 


Anion Gap    11


 


BUN    17


 


Creatinine    1.0


 


Est GFR ( Amer)    > 60


 


Est GFR (Non-Af Amer)    > 60


 


POC Glucose (mg/dL)  226 H  291 H 


 


Random Glucose    353 H


 


Calcium    8.1 L














  01/07/18 01/08/18 01/08/18





  21:17 06:18 11:10


 


Sodium   


 


Potassium   


 


Chloride   


 


Carbon Dioxide   


 


Anion Gap   


 


BUN   


 


Creatinine   


 


Est GFR ( Amer)   


 


Est GFR (Non-Af Amer)   


 


POC Glucose (mg/dL)  324 H  247 H  211 H


 


Random Glucose   


 


Calcium

## 2018-01-08 NOTE — CP.PCM.PN
Subjective





- Date & Time of Evaluation


Date of Evaluation: 01/08/18


Time of Evaluation: 07:05





- Subjective


Subjective: 





General Surgery Note for Dr. Kulkarni





Patient seen and examined at bedside. No acute event overnight. Patient is NPO 

for EGD today. Denies nausea/vomiting. (+)Flatus (-)BM. No other complaints at 

this time.





Objective





- Vital Signs/Intake and Output


Vital Signs (last 24 hours): 


 











Temp Pulse Resp BP Pulse Ox


 


 99.1 F   78   17   143/84   97 


 


 01/08/18 09:15  01/08/18 09:45  01/08/18 09:45  01/08/18 09:45  01/08/18 09:45








Intake and Output: 


 











 01/08/18 01/08/18





 06:59 18:59


 


Intake Total  500


 


Output Total 2150 


 


Balance -2150 500














- Medications


Medications: 


 Current Medications





Acetaminophen (Tylenol 325mg Tab)  650 mg PO Q6 PRN


   PRN Reason: Fever >100.4 F


   Last Admin: 01/04/18 00:17 Dose:  650 mg


Albuterol/Ipratropium (Duoneb 3 Mg/0.5 Mg (3 Ml) Ud)  3 ml INH RQ6 PRN


   PRN Reason: Cough


Amiodarone HCl (Cordarone)  200 mg PO DAILY [facility]


   Last Admin: 01/07/18 10:11 Dose:  200 mg


Amlodipine Besylate (Norvasc)  10 mg PO DAILY [facility]


   Last Admin: 01/07/18 10:11 Dose:  10 mg


Aspirin (Aspirin Chewable)  81 mg PO DAILY [facility]


   Last Admin: 01/07/18 10:09 Dose:  81 mg


Dextrose (Dextrose 50% Inj)  0 ml IV STAT PRN; Protocol


   PRN Reason: Hypoglycemia Protocol


Dextrose (Glutose 15)  0 gm PO ONCE PRN; Protocol


   PRN Reason: Hypoglycemia Protocol


Glucagon (Glucagen Diagnostic Kit)  0 mg IM STAT PRN; Protocol


   PRN Reason: Hypoglycemia Protocol


Heparin Sodium (Porcine) (Heparin)  5,000 units SC Q12 [facility]


   Last Admin: 01/04/18 13:29 Dose:  Not Given


Sodium Chloride (Sodium Chloride 0.9%)  1,000 mls @ 75 mls/hr IV .[id number] [facility]


   Last Admin: 01/07/18 19:21 Dose:  Not Given


Cefepime HCl (Maxipime Iv 1 Gm Premix)  1 gm in 50 mls @ 100 mls/hr IVPB Q12H 

[facility]


   Last Admin: 01/08/18 06:01 Dose:  100 mls/hr


Insulin Glargine (Lantus)  9 unit SC HS [facility]


   Last Admin: 01/07/18 21:33 Dose:  9 units


Insulin Human Regular (Novolin R)  0 unit SC [facility]


   PRN Reason: Protocol


   Last Admin: 01/08/18 08:05 Dose:  Not Given


Metoprolol Succinate (Toprol Xl)  50 mg PO DAILY [facility]


   Last Admin: 01/07/18 10:12 Dose:  50 mg


Metoprolol Tartrate (Lopressor)  5 mg IVP Q6H PRN


   PRN Reason: Systolic Blood Pressure


Omega-3-Acid Ethyl Esters (Lovaza)  1 gm PO BID [facility]


   Last Admin: 01/07/18 17:15 Dose:  1 gm


Ondansetron HCl (Zofran Inj)  4 mg IVP Q6H PRN


   PRN Reason: Nausea/Vomiting


   Last Admin: 01/04/18 03:42 Dose:  4 mg


Oxybutynin Chloride (Ditropan Xl)  5 mg PO DAILY [facility]


   Last Admin: 01/07/18 10:10 Dose:  5 mg


Pantoprazole Sodium (Protonix Ec Tab)  40 mg PO DAILY [facility]


   Last Admin: 01/07/18 10:20 Dose:  40 mg


Polyethylene Glycol (Miralax)  17 gm PO DAILY [facility]


   Last Admin: 01/07/18 10:40 Dose:  17 gm


Rivaroxaban (Xarelto)  20 mg PO DAILY [facility]


Rosuvastatin Calcium (Crestor)  10 mg PO [facility]


   Last Admin: 01/07/18 21:33 Dose:  10 mg


Saccharomyces Boulardii (Florastor)  250 mg PO BID [facility]


   Last Admin: 01/07/18 17:15 Dose:  250 mg


Sitagliptin Phosphate (Januvia)  50 mg PO DAILY [facility]


   Last Admin: 01/07/18 10:11 Dose:  50 mg











- Labs


Labs: 


 





 01/07/18 08:39 





 01/07/18 17:53 





 











PT  13.4 SECONDS (9.7-12.2)  H  01/04/18  13:46    


 


INR  1.2   01/04/18  13:46    














- Constitutional


Appears: No Acute Distress





- Head Exam


Head Exam: ATRAUMATIC, NORMOCEPHALIC





- ENT Exam


ENT Exam: Mucous Membranes Moist





- Respiratory Exam


Respiratory Exam: NORMAL BREATHING PATTERN





- Cardiovascular Exam


Cardiovascular Exam: REGULAR RHYTHM





- GI/Abdominal Exam


GI & Abdominal Exam: Soft, Normal Bowel Sounds.  absent: Tenderness





- Neurological Exam


Neurological Exam: Awake





- Psychiatric Exam


Psychiatric exam: Flat Affect





- Skin


Skin Exam: Dry, Warm





Assessment and Plan





- Assessment and Plan (Free Text)


Plan: 





77 M with suspected Ileus vs SBO - improving





- f/u EGD report


- ADAT


- Monitor fo bowel function


- Medical management as per primary


- No plans for surgical intervention at this time


- Discussed with Dr. Shad Shell PGY1

## 2018-01-08 NOTE — CP.PCM.PN
<Geneva Eason - Last Filed: 18 18:12>





Subjective





- Date & Time of Evaluation


Date of Evaluation: 18


Time of Evaluation: 08:09





- Subjective


Subjective: 





Medicine Progress Note: Hospitalist Service





Patient seen and examined at bedside. Per nursing no acute events overnight. 

Patient is doing well, offers no complaints at this time. Patient is NPO for 

endoscopy today with GI. Passing flatus, no BM. Denies headaches, dizziness, cp

, palpitations, abdominal pain, urinary symptoms. 





Objective





- Vital Signs/Intake and Output


Vital Signs (last 24 hours): 


 











Temp Pulse Resp BP Pulse Ox


 


 99.6 F   88   20   135/72   95 


 


 18 07:52  18 07:52  18 07:52  18 07:52  18 07:52








Intake and Output: 


 











 18





 06:59 18:59


 


Output Total 2150 


 


Balance -2150 














- Medications


Medications: 


 Current Medications





Acetaminophen (Tylenol 325mg Tab)  650 mg PO Q6 PRN


   PRN Reason: Fever >100.4 F


   Last Admin: 18 00:17 Dose:  650 mg


Albuterol/Ipratropium (Duoneb 3 Mg/0.5 Mg (3 Ml) Ud)  3 ml INH RQ6 PRN


   PRN Reason: Cough


Amiodarone HCl (Cordarone)  200 mg PO DAILY [facility]


   Last Admin: 18 10:11 Dose:  200 mg


Amlodipine Besylate (Norvasc)  10 mg PO DAILY [facility]


   Last Admin: 18 10:11 Dose:  10 mg


Aspirin (Aspirin Chewable)  81 mg PO DAILY [facility]


   Last Admin: 18 10:09 Dose:  81 mg


Dextrose (Dextrose 50% Inj)  0 ml IV STAT PRN; Protocol


   PRN Reason: Hypoglycemia Protocol


Dextrose (Glutose 15)  0 gm PO ONCE PRN; Protocol


   PRN Reason: Hypoglycemia Protocol


Glucagon (Glucagen Diagnostic Kit)  0 mg IM STAT PRN; Protocol


   PRN Reason: Hypoglycemia Protocol


Heparin Sodium (Porcine) (Heparin)  5,000 units SC Q12 [facility]


   Last Admin: 18 13:29 Dose:  Not Given


Vancomycin/Sodium Chloride (Vancomycin 1 Gm/Ns 200 Ml)  1 gm in 200 mls @ 166.7 

mls/hr IVPB Q24H [facility]


   Stop: 18 10:01


   Last Admin: 18 10:44 Dose:  166.7 mls/hr


Sodium Chloride (Sodium Chloride 0.9%)  1,000 mls @ 75 mls/hr IV .[id number] [facility]


   Last Admin: 18 19:21 Dose:  Not Given


Cefepime HCl (Maxipime Iv 1 Gm Premix)  1 gm in 50 mls @ 100 mls/hr IVPB Q12H 

[facility]


   Last Admin: 18 06:01 Dose:  100 mls/hr


Insulin Glargine (Lantus)  9 unit SC [facility]


   Last Admin: 18 21:33 Dose:  9 units


Insulin Human Regular (Novolin R)  [facility]


   PRN Reason: Protocol


   Last Admin: 18 08:05 Dose:  Not Given


Metoprolol Succinate (Toprol Xl)  50 mg PO DAILY [facility]


   Last Admin: 18 10:12 Dose:  50 mg


Metoprolol Tartrate (Lopressor)  5 mg IVP Q6H PRN


   PRN Reason: Systolic Blood Pressure


Omega-3-Acid Ethyl Esters (Lovaza)  1 gm PO BID [facility]


   Last Admin: 18 17:15 Dose:  1 gm


Ondansetron HCl (Zofran Inj)  4 mg IVP Q6H PRN


   PRN Reason: Nausea/Vomiting


   Last Admin: 18 03:42 Dose:  4 mg


Oxybutynin Chloride (Ditropan Xl)  5 mg PO DAILY [facility]


   Last Admin: 18 10:10 Dose:  5 mg


Pantoprazole Sodium (Protonix Ec Tab)  40 mg PO DAILY [facility]


   Last Admin: 18 10:20 Dose:  40 mg


Polyethylene Glycol (Miralax)  17 gm PO DAILY [facility]


   Last Admin: 18 10:40 Dose:  17 gm


Rivaroxaban (Xarelto)  20 mg PO DAILY [facility]


Rosuvastatin Calcium (Crestor)  10 mg PO HS [facility]


   Last Admin: 18 21:33 Dose:  10 mg


Saccharomyces Boulardii (Florastor)  250 mg PO BID [facility]


   Last Admin: 18 17:15 Dose:  250 mg


Sitagliptin Phosphate (Januvia)  50 mg PO DAILY [facility]


   Last Admin: 18 10:11 Dose:  50 mg











- Labs


Labs: 


 





 18 08:39 





 18 17:53 





 











PT  13.4 SECONDS (9.7-12.2)  H  18  13:46    


 


INR  1.2   18  13:46    














- Additional Findings


Additional findings: 





- Constitutional


Appears: Well, No Acute Distress





- Head Exam


Head Exam: ATRAUMATIC, NORMAL INSPECTION





- Eye Exam


Eye Exam: EOMI, Normal appearance





- ENT Exam


ENT Exam: Mucous Membranes Moist





- Respiratory Exam


Respiratory Exam: Decreased Breath Sounds, NORMAL BREATHING PATTERN.  absent: 

Rales, Wheezes





- Cardiovascular Exam


Cardiovascular Exam: REGULAR RHYTHM, +S1, +S2,





- GI/Abdominal Exam


GI & Abdominal Exam: Soft, Normal Bowel Sounds.  absent: Distended, Firm, 

Guarding, Rigid, Tenderness





-  Exam


 Exam: Sky in draining clear yellow urine 





- Rectal Exam


Rectal Exam: Deferred





- Extremities Exam


Additional comments: 


+SCDs





- Neurological Exam


Neurological Exam: Alert, Awake, Oriented x3


Neuro motor strength exam: Left Upper Extremity: 0, Right Upper Extremity: 5, 

Left Lower Extremity: 3, Right Lower Extremity: 5





- Psychiatric Exam


Psychiatric exam: Normal Affect, Normal Mood





- Skin


Skin Exam: Dry, Normal Color








Assessment and Plan





- Assessment and Plan (Free Text)


Assessment: 





(1) Small bowel obstruction vs Ileus, Enteritis, Possible Coffee ground emesis


Assessment and Plan:


* Patient had moderately distended abdomen with episode of coffee ground emesis 


* CT abd/pelvis: distended stomach, mild proximal small bowel wall thickening, 

correlate clinically for possible enteritis (per discussion with Radiologist)


* General Surgery Consulted, Dr Kulkarni, help appreciated


* NGT was placed on 18, approx 550cc of coffee ground emesis in cannister, 

NGT removed 18


* GI consulted, Dr Khoury, help appreciated- Enteritis likely was causing 

transient SBO


* Patient went for EGD today - showed erosive esophagitis and gastritis, will f/

u biopsy results, Per GI patient will need repeat EGD in 3 months


* Continue Protonix 40mg PO BID


* Diet GI altered diet 


* Continue gentle hydration with NS @ 75cc/hr


* Continue to monitor bowel function 


* Miralax 17gm PO daily





(2) Sepsis


Assessment and Plan: 


* Secondary to Urinary tract infection, Pneumonia, Bacteremia


* Code Sepsis 18: 11:40 AM in ED. Criteria - elevated WBC, elevated HR, 

Fever, elevated Lactate. In the ED -> Aztreonam, Vancomycin, NS bolus.


* Patient not given fluid as 30 mg/kg given concern for congestive heart 

failure.


* Infectious Disease on consult, Dr Quinteros, help appreciated 


* Leukocytosis resolved 


* Antibiotics: Cefepime 1gm Q12H, Florastor 250mg PO BID, Zosyn 2.25 mg IVP Q8H 

(discontinued), Vancomycin 1g IVP Q24H (discontinued)


* Tylenol 650mg PO Q6H PRN for fever


* Continue Gentle hydration with NS @ 75 cc/hr


* 18 Urine Cx growing enterobacter aerogenes


* 18 Blood Cx (10:45am): No growth x 5 days


* 18 Blood Cx (11:00am): Growing Streptococcus Mitis and Coagulase Negative 

Staphlococcus (likely contaminant)


* 1/3/18 Blood Cx showing no growth after 4 hours x 2


* Initial Sputum Cx contaminated, f/u repeat sputum cx


* 18 Repeat Urine Cx showing no growth  


* Throat culture showing no growth, Flu negative, urine legionella negative


* Mycoplasma IgG 1.84 (high), Mycoplasma IgM 30 (within normal limits)


* Procal 0.80, Lactic acid normalized


* Duonebs PRN shortness of breathe


* Oxygen 3 Liter for nasal cannula


* Imaging:


 * CT chest w/o contrast: No consolidative infiltrate suggested, Mild bibasilar 

peripheral subplerual septal lines noted, Bilateral renal masses, There are at 

least 2 right renal masses that appear hypodense (please see full report)


 * Bladder US: Renal cysts; right kidney 11.3x7.6x10.2cm cyst, 1.3x1.3.1.2cm 

cyst; left kidney 1.3x.10.0.9cm cyst 


 * CXR on admission: No consolidation, cardiomegaly with limited visualization 

of the left costophrenic angle (see full report)


 * CT abd/pelvis: 11.1 x 10.2 cm complex right cystic lesion with evidence of 

calcification and septation. 7mm right upper pole echogenic lesion, 

indeterminate. 17mm low-density, left upper pole kidney 


 * CXR 17: suspect left lower pneumonia, F/U PA/Lateral radiographs 

recommended


Status: Acute   Priority: High   





(3) History of Atrial fibrillation


Assessment and Plan: 


* Hx of Pacemaker (or AICD)


* CHADS score 5 -> 12.5% risk of event per yr w/o AO; HASBLED 4 -> Patient at 

high risk for major bleeding


* Aspirin 81mg PO QDaily


* Metoprolol Succinate 75mg PO QDaily


* Amiodarone 200mg PO QDaily 


* TSH and Free T4 within normal limits


* Patient sees Dr Astudillo outpatient


* Per discussion with Dr Dent, patient is supposed to be on Xarelto 20mg PO 

daily, however currently on hold 


* Cardiology on consult, Dr Chapman, help appreciated


Status: Chronic   Priority: High   





(4) Cardiomegaly


Assessment and Plan: 


* Echo from  showed normal EF and some possible diastolic dysfunction


* Repeat Echo completed, f/u official report


* Per cardio, patient has preserved LVEF with mild mitral regurgitation


* Gentle IV hydration


Status: Acute   Priority: High   





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


Assessment and Plan: 


* Aspirin 81mg PO QD 


* Crestor 10 mg PO HS 


* PT and OT on board


Status: Chronic   Priority: Medium   





(6) Diabetes mellitus


Assessment and Plan: 


* Lantus 9 units HS 


* ISS medium dose, Accuchecks Q6H


* HgbA1C 8.1


* Januvia 50mg PO QDaily


* HOLD metformin 1000mg PO BID 2/2 mild elevation in lactate


* Hypoglycemia protocol


Status: Chronic   Priority: Medium   





(7) HTN (hypertension)


Assessment and Plan: 


* BPs have been fluctuating. SBPs up to 160s at times


* Will increase Toprol XL to 75mg PO daily 


* Norvasc 10mg PO QDaily


* Monitor vitals 


Status: Chronic   Priority: High   





(8) Lipid disorder


Assessment and Plan: 


* Lipid panel- T, Chol: 83, LDL<30, HDL: 27


* Lovaza 1g PO BID 


* Crestor 10 mg PO HS 


Status: Chronic 





(9) Abnormal urinalysis


       History of Urinary Incontinence


Assessment and Plan: 


* History of Urinary Incontinence


* Straight cath in ED with 200ml of normal appearing urine given he appeared he 

had difficulty urinating in urinal


* Bladder scan PRN


* Urine culture growing enterobacter aerogenes, sensitivities reviewed


* Repeat UA 18: +1 ketones, +1 blood, +2 Leuk esterase, 24 WBCs


* Repeat Urine culture showing no growth 


* Contiue Oxybutynin XL 5mg PO QDaily 


* CT Abdomen/Pelvis (w/o contrast): 11.11 X 10.2cm complex right cystic lesion 

with calcification and septation. nonobstructing left renal calculus. 

Distension of the stomach. Mild proximal small bowel wall thickening. Blood 

loop appear within normal limits of caliber


* Per PMD, in  patient had right renal cyst that measured 9cm


* Urology on consult, Dr Bender, mirella appreciated 


Status: Acute   





(10) Hypokalemia


Assessment and Plan: 


*  Will continue to monitor


 


(11) Prophylactic measure


Assessment and Plan: 


* Protonix 40mg PO BID


* SCDs


* hold Heparin 5000u SC Q8H due to possible coffee ground emesis


* Florastor 250mg PO BID 


* PT/OT eval and treat: recommend for KITTY








Disposition: PT is recommending KITTY. Will discuss with Patient's daughter 

options (KITTY vs Home PT) for disposition as patient did come from home and does 

not have any new deficits. Will also follow up with . 








<Fabricio Castro - Last Filed: 18 20:25>





Objective





- Vital Signs/Intake and Output


Vital Signs (last 24 hours): 


 











Temp Pulse Resp BP Pulse Ox


 


 98.7 F   88   20   160/77 H  95 


 


 18 15:35  18 15:35  18 15:35  18 15:35  18 15:35








Intake and Output: 


 











 18





 18:59 06:59


 


Intake Total 1380 


 


Output Total 1000 


 


Balance 380 














- Medications


Medications: 


 Current Medications





Acetaminophen (Tylenol 325mg Tab)  650 mg PO Q6 PRN


   PRN Reason: Fever >100.4 F


   Last Admin: 18 00:17 Dose:  650 mg


Albuterol/Ipratropium (Duoneb 3 Mg/0.5 Mg (3 Ml) Ud)  3 ml INH RQ6 PRN


   PRN Reason: Cough


Amiodarone HCl (Cordarone)  200 mg PO DAILY [facility]


   Last Admin: 18 11:16 Dose:  200 mg


Amlodipine Besylate (Norvasc)  10 mg PO DAILY [facility]


   Last Admin: 18 11:16 Dose:  10 mg


Aspirin (Aspirin Chewable)  81 mg PO DAILY [facility]


   Last Admin: 18 11:16 Dose:  81 mg


Dextrose (Dextrose 50% Inj)  0 ml IV STAT PRN; Protocol


   PRN Reason: Hypoglycemia Protocol


Dextrose (Glutose 15)  0 gm PO ONCE PRN; Protocol


   PRN Reason: Hypoglycemia Protocol


Glucagon (Glucagen Diagnostic Kit)  0 mg IM STAT PRN; Protocol


   PRN Reason: Hypoglycemia Protocol


Heparin Sodium (Porcine) (Heparin)  5,000 units SC Q12 [facility]


   Last Admin: 18 13:29 Dose:  Not Given


Sodium Chloride (Sodium Chloride 0.9%)  1,000 mls @ 75 mls/hr IV .[id number] [facility]


   Last Admin: 18 19:21 Dose:  Not Given


Cefepime HCl (Maxipime Iv 1 Gm Premix)  1 gm in 50 mls @ 100 mls/hr IVPB Q12H 

[facility]


   Last Admin: 18 18:42 Dose:  100 mls/hr


Insulin Glargine (Lantus)  9 unit SC HS [facility]


   Last Admin: 18 21:33 Dose:  9 units


Insulin Human Regular (Novolin R)  0 unit SC [facility]


   PRN Reason: Protocol


   Last Admin: 18 17:10 Dose:  4 unit


Metoprolol Succinate (Toprol Xl)  50 mg PO DAILY [facility]


   Last Admin: 18 11:16 Dose:  50 mg


Metoprolol Succinate (Toprol Xl)  25 mg PO DAILY [facility]


Metoprolol Tartrate (Lopressor)  5 mg IVP Q6H [facility]N


   PRN Reason: Systolic Blood Pressure


Omega-3-Acid Ethyl Esters (Lovaza)  1 gm PO BID [facility]


   Last Admin: 18 18:34 Dose:  1 gm


Ondansetron HCl (Zofran Inj)  4 mg IVP Q6H PRN


   PRN Reason: Nausea/Vomiting


   Last Admin: 18 03:42 Dose:  4 mg


Oxybutynin Chloride (Ditropan Xl)  5 mg PO DAILY [facility]


   Last Admin: 18 11:15 Dose:  5 mg


Pantoprazole Sodium (Protonix Ec Tab)  40 mg PO BID [facility]


   Last Admin: 18 17:58 Dose:  40 mg


Polyethylene Glycol (Miralax)  17 gm PO DAILY [facility]


   Last Admin: 18 11:15 Dose:  17 gm


Rivaroxaban (Xarelto)  20 mg PO DAILY [facility]


Rosuvastatin Calcium (Crestor)  10 mg PO HS [facility]


   Last Admin: 18 21:33 Dose:  10 mg


Saccharomyces Boulardii (Florastor)  250 mg PO BID [facility]


   Last Admin: 18 18:34 Dose:  250 mg


Sitagliptin Phosphate (Januvia)  50 mg PO DAILY [facility]


   Last Admin: 18 11:23 Dose:  Not Given











- Labs


Labs: 


 





 18 08:39 





 18 17:53 





 











PT  13.4 SECONDS (9.7-12.2)  H  18  13:46    


 


INR  1.2   18  13:46    














Attending/Attestation





- Attestation


I have personally seen and examined this patient.: Yes


I have fully participated in the care of the patient.: Yes


I have reviewed all pertinent clinical information, including history, physical 

exam and plan: Yes


Notes (Text): 





18 20:12


Patient was seen and examined shortly after resident.





Exam, Assessment and Plan were thoroughly gone over with the resident





Assessments:





1). Coffee Ground Emesis/Abdominal Distention/SBOvsIleus: NGT discontinued on 18. F/U official results of Upper Endoscopy 18 by GI Dr. Khoury. Flatus (+

) but NO bowel movement yet as per patient





2). Sepsis Secondary to UTI/Bacteremia:


Urine Culture 18 showed Enterbacter and repeat Urine Culture 18 was 

negative


Blood Culture 18 showed Strep mitis and Coag Neg Staph and repeat Blood 

Culture 1/3/18 is negative to date


On Cefepime (18) and Vancomycin (1/3/18): medicine team speak with ID Dr. Quinteros to see if these can be discontinued in light of repeat culture results





3). Hx Afib


Amiodarone, ASA, Metoprolol XL (which was increased to 75 mg PO 1x/day in light 

of elevated Blood Pressure and episodes of HR in the 120s)


Xarelto is being held upon admission





4). Cardiomegaly/AICD


F/U Official 2D Echo report. However Cardiologist Dr. Chapman read it and 

stated that there was preserved EF and LVSF and Mitral Regurgitation





5). Hx CVA with Left Sided Residual Weakness


ASA, Crestor


Physical Therapy recommends KITTY: medicine team speak with  to see 

if this can be arranged or if family would prefer home PT





6). DM 2


Yanelis Oleary, RISS, Hypoglycemia Protocol





7). HTN


Norvasc, Metoprolol XL (which was increased to 75 mg PO 1x/day in light of 

elevated Blood Pressure and episodes of HR in the 120s)





8). HLD


Crestor, Omega 3 Fatty Acid





9). Right Renal Complex Cyst


As seen on CT Abdomen/Pelvis


Medical Resident spoke with PMD Dr. Taylor and he is aware


Patient will need further evaluation of this issue by Urologist Dr. Cathy Bender while in-patient vs continued monitoring as an outpatient with repeat 

CT.





10). Hx Urinary Incontinence


Bladder Scan PRN


Ditropan





Fabricio Castro D.O.

## 2018-01-09 LAB
ALBUMIN SERPL-MCNC: 3.2 G/DL (ref 3.5–5)
ALBUMIN/GLOB SERPL: 1 {RATIO} (ref 1–2.1)
ALT SERPL-CCNC: 30 U/L (ref 21–72)
AST SERPL-CCNC: 22 U/L (ref 17–59)
BASOPHILS # BLD AUTO: 0.1 K/UL (ref 0–0.2)
BASOPHILS NFR BLD: 0.9 % (ref 0–2)
BUN SERPL-MCNC: 16 MG/DL (ref 9–20)
CALCIUM SERPL-MCNC: 8 MG/DL (ref 8.6–10.4)
EOSINOPHIL # BLD AUTO: 0.3 K/UL (ref 0–0.7)
EOSINOPHIL NFR BLD: 3.9 % (ref 0–4)
ERYTHROCYTE [DISTWIDTH] IN BLOOD BY AUTOMATED COUNT: 14.4 % (ref 11.5–14.5)
GFR NON-AFRICAN AMERICAN: > 60
HGB BLD-MCNC: 12.6 G/DL (ref 12–18)
LYMPHOCYTES # BLD AUTO: 1.8 K/UL (ref 1–4.3)
LYMPHOCYTES NFR BLD AUTO: 22.1 % (ref 20–40)
MAGNESIUM SERPL-MCNC: 1.9 MG/DL (ref 1.6–2.3)
MCH RBC QN AUTO: 26.6 PG (ref 27–31)
MCHC RBC AUTO-ENTMCNC: 32.9 G/DL (ref 33–37)
MCV RBC AUTO: 80.8 FL (ref 80–94)
MONOCYTES # BLD: 1.1 K/UL (ref 0–0.8)
MONOCYTES NFR BLD: 13.4 % (ref 0–10)
NEUTROPHILS # BLD: 4.9 K/UL (ref 1.8–7)
NEUTROPHILS NFR BLD AUTO: 59.7 % (ref 50–75)
NRBC BLD AUTO-RTO: 0.1 % (ref 0–2)
PLATELET # BLD: 233 K/UL (ref 130–400)
PMV BLD AUTO: 9.5 FL (ref 7.2–11.7)
RBC # BLD AUTO: 4.73 MIL/UL (ref 4.4–5.9)
WBC # BLD AUTO: 8.3 K/UL (ref 4.8–10.8)

## 2018-01-09 RX ADMIN — POLYETHYLENE GLYCOL 3350 SCH GM: 17 POWDER, FOR SOLUTION ORAL at 17:47

## 2018-01-09 RX ADMIN — METOPROLOL SUCCINATE SCH MG: 50 TABLET, EXTENDED RELEASE ORAL at 10:56

## 2018-01-09 RX ADMIN — HUMAN INSULIN SCH: 100 INJECTION, SOLUTION SUBCUTANEOUS at 21:11

## 2018-01-09 RX ADMIN — CEFEPIME SCH MLS/HR: 1 INJECTION, SOLUTION INTRAVENOUS at 06:36

## 2018-01-09 RX ADMIN — Medication SCH MG: at 17:47

## 2018-01-09 RX ADMIN — PANTOPRAZOLE SODIUM SCH MG: 40 TABLET, DELAYED RELEASE ORAL at 21:57

## 2018-01-09 RX ADMIN — PANTOPRAZOLE SODIUM SCH MG: 40 TABLET, DELAYED RELEASE ORAL at 10:31

## 2018-01-09 RX ADMIN — Medication SCH MG: at 10:44

## 2018-01-09 RX ADMIN — INSULIN GLARGINE SCH UNITS: 100 INJECTION, SOLUTION SUBCUTANEOUS at 21:17

## 2018-01-09 RX ADMIN — OMEGA-3-ACID ETHYL ESTERS SCH GM: 900 CAPSULE, LIQUID FILLED ORAL at 10:31

## 2018-01-09 RX ADMIN — HUMAN INSULIN SCH UNIT: 100 INJECTION, SOLUTION SUBCUTANEOUS at 17:47

## 2018-01-09 RX ADMIN — METOPROLOL SUCCINATE SCH MG: 25 TABLET, EXTENDED RELEASE ORAL at 10:31

## 2018-01-09 RX ADMIN — CEFEPIME SCH MLS/HR: 1 INJECTION, SOLUTION INTRAVENOUS at 19:45

## 2018-01-09 RX ADMIN — POLYETHYLENE GLYCOL 3350 SCH GM: 17 POWDER, FOR SOLUTION ORAL at 10:32

## 2018-01-09 RX ADMIN — OMEGA-3-ACID ETHYL ESTERS SCH GM: 900 CAPSULE, LIQUID FILLED ORAL at 17:47

## 2018-01-09 RX ADMIN — HUMAN INSULIN SCH UNIT: 100 INJECTION, SOLUTION SUBCUTANEOUS at 13:35

## 2018-01-09 RX ADMIN — HUMAN INSULIN SCH UNIT: 100 INJECTION, SOLUTION SUBCUTANEOUS at 10:30

## 2018-01-09 NOTE — CP.PCM.PN
Subjective





- Date & Time of Evaluation


Date of Evaluation: 01/09/18


Time of Evaluation: 07:00





- Subjective


Subjective: 





Surgical Progress Note:





Patient was seen and examined at bedside in the AM.  Per nurse no acute events 

overnight.  Per nurse the patient has not had a bowel movement yet.  Patient 

denies nausea or vomiting. 





Objective





- Vital Signs/Intake and Output


Vital Signs (last 24 hours): 


 











Temp Pulse Resp BP Pulse Ox


 


 97.7 F   75   20   156/81 H  97 


 


 01/09/18 08:32  01/09/18 08:32  01/09/18 08:32  01/09/18 08:32  01/09/18 08:32








Intake and Output: 


 











 01/09/18 01/09/18





 06:59 18:59


 


Intake Total 800 


 


Output Total 600 


 


Balance 200 














- Medications


Medications: 


 Current Medications





Acetaminophen (Tylenol 325mg Tab)  650 mg PO Q6 PRN


   PRN Reason: Fever >100.4 F


   Last Admin: 01/04/18 00:17 Dose:  650 mg


Albuterol/Ipratropium (Duoneb 3 Mg/0.5 Mg (3 Ml) Ud)  3 ml INH RQ6 PRN


   PRN Reason: Cough


Amiodarone HCl (Cordarone)  200 mg PO DAILY [facility]


   Last Admin: 01/08/18 11:16 Dose:  200 mg


Amlodipine Besylate (Norvasc)  10 mg PO DAILY [facility]


   Last Admin: 01/08/18 11:16 Dose:  10 mg


Aspirin (Aspirin Chewable)  81 mg PO DAILY [facility]


   Last Admin: 01/08/18 11:16 Dose:  81 mg


Dextrose (Dextrose 50% Inj)  0 ml IV STAT PRN; Protocol


   PRN Reason: Hypoglycemia Protocol


Dextrose (Glutose 15)  0 gm PO ONCE PRN; Protocol


   PRN Reason: Hypoglycemia Protocol


Glucagon (Glucagen Diagnostic Kit)  0 mg IM STAT PRN; Protocol


   PRN Reason: Hypoglycemia Protocol


Heparin Sodium (Porcine) (Heparin)  5,000 units SC Q12 [facility]


   Last Admin: 01/04/18 13:29 Dose:  Not Given


Sodium Chloride (Sodium Chloride 0.9%)  1,000 mls @ 75 mls/hr IV .[id number] [facility]


   Last Admin: 01/09/18 00:46 Dose:  75 mls/hr


Cefepime HCl (Maxipime Iv 1 Gm Premix)  1 gm in 50 mls @ 100 mls/hr IVPB Q12H 

[facility]


   Last Admin: 01/09/18 06:36 Dose:  100 mls/hr


Insulin Glargine (Lantus)  9 unit SC HS [facility]


   Last Admin: 01/08/18 21:27 Dose:  9 units


Insulin Human Regular (Novolin R)  0 unit SC ACHS [facility]


   PRN Reason: Protocol


   Last Admin: 01/08/18 21:24 Dose:  Not Given


Metoprolol Succinate (Toprol Xl)  50 mg PO DAILY [facility]


   Last Admin: 01/08/18 11:16 Dose:  50 mg


Metoprolol Succinate (Toprol Xl)  25 mg PO DAILY [facility]


Metoprolol Tartrate (Lopressor)  5 mg IVP Q6H PRN


   PRN Reason: Systolic Blood Pressure


Omega-3-Acid Ethyl Esters (Lovaza)  1 gm PO BID [facility]


   Last Admin: 01/08/18 18:34 Dose:  1 gm


Ondansetron HCl (Zofran Inj)  4 mg IVP Q6H PRN


   PRN Reason: Nausea/Vomiting


   Last Admin: 01/04/18 03:42 Dose:  4 mg


Oxybutynin Chloride (Ditropan Xl)  5 mg PO DAILY [facility]


   Last Admin: 01/08/18 11:15 Dose:  5 mg


Pantoprazole Sodium (Protonix Ec Tab)  40 mg PO BID [facility]


   Last Admin: 01/08/18 17:58 Dose:  40 mg


Polyethylene Glycol (Miralax)  17 gm PO BID [facility]


Rivaroxaban (Xarelto)  20 mg PO DAILY [facility]


Rosuvastatin Calcium (Crestor)  10 mg PO HS [facility]


   Last Admin: 01/08/18 21:23 Dose:  10 mg


Saccharomyces Boulardii (Florastor)  250 mg PO BID [facility]


   Last Admin: 01/08/18 18:34 Dose:  250 mg


Sitagliptin Phosphate (Januvia)  50 mg PO DAILY [facility]


   Last Admin: 01/08/18 11:23 Dose:  Not Given











- Labs


Labs: 


 





 01/09/18 07:21 





 01/09/18 07:21 





 











PT  13.4 SECONDS (9.7-12.2)  H  01/04/18  13:46    


 


INR  1.2   01/04/18  13:46    














- Constitutional


Appears: No Acute Distress





- Head Exam


Head Exam: ATRAUMATIC, NORMAL INSPECTION





- Eye Exam


Eye Exam: EOMI, Normal appearance





- ENT Exam


ENT Exam: Mucous Membranes Moist





- Respiratory Exam


Respiratory Exam: NORMAL BREATHING PATTERN





- Cardiovascular Exam


Cardiovascular Exam: +S1, +S2





- GI/Abdominal Exam


GI & Abdominal Exam: Soft, Normal Bowel Sounds.  absent: Tenderness





- Neurological Exam


Neurological Exam: Alert, Awake





- Psychiatric Exam


Psychiatric exam: Normal Affect, Normal Mood





- Skin


Skin Exam: Normal Color, Warm





Assessment and Plan





- Assessment and Plan (Free Text)


Assessment: 





77 M with suspected Ileus vs SBO - improving





- Monitor for bowel function


- Medical management as per primary


- No plans for surgical intervention at this time





Yvette Ha PGY-1

## 2018-01-09 NOTE — CP.PCM.PN
<Chandrika Villafuerte - Last Filed: 01/09/18 08:56>





Subjective





- Date & Time of Evaluation


Date of Evaluation: 01/09/18


Time of Evaluation: 06:50





- Subjective


Subjective: 


GI Fellow PGY4 Progress Note


Pt seen and evaluated at bedside, pt denies any abdominal pain. +Flatus and no 

BM overnight. Pt tolerating diet. 


ROS: A 12pt ROS was negative except as above. 











Objective





- Vital Signs/Intake and Output


Vital Signs (last 24 hours): 


 











Temp Pulse Resp BP Pulse Ox


 


 97.7 F   75   20   156/81 H  97 


 


 01/09/18 08:32  01/09/18 08:32  01/09/18 08:32  01/09/18 08:32  01/09/18 08:32








Intake and Output: 


 











 01/09/18 01/09/18





 06:59 18:59


 


Intake Total 800 


 


Output Total 600 


 


Balance 200 














- Medications


Medications: 


 Current Medications





Acetaminophen (Tylenol 325mg Tab)  650 mg PO Q6 PRN


   PRN Reason: Fever >100.4 F


   Last Admin: 01/04/18 00:17 Dose:  650 mg


Albuterol/Ipratropium (Duoneb 3 Mg/0.5 Mg (3 Ml) Ud)  3 ml INH RQ6 PRN


   PRN Reason: Cough


Amiodarone HCl (Cordarone)  200 mg PO DAILY [facility]


   Last Admin: 01/08/18 11:16 Dose:  200 mg


Amlodipine Besylate (Norvasc)  10 mg PO DAILY [facility]


   Last Admin: 01/08/18 11:16 Dose:  10 mg


Aspirin (Aspirin Chewable)  81 mg PO DAILY [facility]


   Last Admin: 01/08/18 11:16 Dose:  81 mg


Dextrose (Dextrose 50% Inj)  0 ml IV STAT PRN; Protocol


   PRN Reason: Hypoglycemia Protocol


Dextrose (Glutose 15)  0 gm PO ONCE PRN; Protocol


   PRN Reason: Hypoglycemia Protocol


Glucagon (Glucagen Diagnostic Kit)  0 mg IM STAT PRN; Protocol


   PRN Reason: Hypoglycemia Protocol


Heparin Sodium (Porcine) (Heparin)  5,000 units SC Q12 [facility]


   Last Admin: 01/04/18 13:29 Dose:  Not Given


Sodium Chloride (Sodium Chloride 0.9%)  1,000 mls @ 75 mls/hr IV .[id number] [facility]


   Last Admin: 01/09/18 00:46 Dose:  75 mls/hr


Cefepime HCl (Maxipime Iv 1 Gm Premix)  1 gm in 50 mls @ 100 mls/hr IVPB Q12H 

[facility]


   Last Admin: 01/09/18 06:36 Dose:  100 mls/hr


Insulin Glargine (Lantus)  9 unit SC [facility]


   Last Admin: 01/08/18 21:27 Dose:  9 units


Insulin Human Regular (Novolin R)  0 unit SC [facility]


   PRN Reason: Protocol


   Last Admin: 01/08/18 21:24 Dose:  Not Given


Metoprolol Succinate (Toprol Xl)  50 mg PO DAILY [facility]


   Last Admin: 01/08/18 11:16 Dose:  50 mg


Metoprolol Succinate (Toprol Xl)  25 mg PO DAILY [facility]


Metoprolol Tartrate (Lopressor)  5 mg IVP Q6H PRN


   PRN Reason: Systolic Blood Pressure


Omega-3-Acid Ethyl Esters (Lovaza)  1 gm PO BID [facility]


   Last Admin: 01/08/18 18:34 Dose:  1 gm


Ondansetron HCl (Zofran Inj)  4 mg IVP Q6H PRN


   PRN Reason: Nausea/Vomiting


   Last Admin: 01/04/18 03:42 Dose:  4 mg


Oxybutynin Chloride (Ditropan Xl)  5 mg PO DAILY [facility]


   Last Admin: 01/08/18 11:15 Dose:  5 mg


Pantoprazole Sodium (Protonix Ec Tab)  40 mg PO BID [facility]


   Last Admin: 01/08/18 17:58 Dose:  40 mg


Polyethylene Glycol (Miralax)  17 gm PO DAILY [facility]


   Last Admin: 01/08/18 11:15 Dose:  17 gm


Rivaroxaban (Xarelto)  20 mg PO DAILY [facility]


Rosuvastatin Calcium (Crestor)  10 mg PO [facility]


   Last Admin: 01/08/18 21:23 Dose:  10 mg


Saccharomyces Boulardii (Florastor)  250 mg PO BID [facility]


   Last Admin: 01/08/18 18:34 Dose:  250 mg


Sitagliptin Phosphate (Januvia)  50 mg PO DAILY [facility]


   Last Admin: 01/08/18 11:23 Dose:  Not Given











- Labs


Labs: 


 





 01/09/18 07:21 





 01/09/18 07:21 





 











PT  13.4 SECONDS (9.7-12.2)  H  01/04/18  13:46    


 


INR  1.2   01/04/18  13:46    














- Constitutional


Appears: Non-toxic, No Acute Distress





- Head Exam


Head Exam: ATRAUMATIC, NORMAL INSPECTION, NORMOCEPHALIC





- Eye Exam


Eye Exam: EOMI, PERRL


Pupil Exam: PERRL





- ENT Exam


ENT Exam: Mucous Membranes Moist





- Respiratory Exam


Respiratory Exam: Clear to Ausculation Bilateral, NORMAL BREATHING PATTERN





- Cardiovascular Exam


Cardiovascular Exam: Irregular Rhythm





- GI/Abdominal Exam


GI & Abdominal Exam: Soft, Normal Bowel Sounds.  absent: Distended, Guarding, 

Tenderness





- Rectal Exam


Rectal Exam: Deferred





- Extremities Exam


Extremities Exam: Normal Inspection





- Neurological Exam


Neurological Exam: Alert, Awake





- Psychiatric Exam


Psychiatric exam: Normal Affect, Normal Mood





- Skin


Skin Exam: Dry, Intact, Normal Color, Warm





Assessment and Plan





- Assessment and Plan (Free Text)


Assessment: 


This is a a 77yM presenting with fevers and weakness, found to UTI and 

bacteremia. 


1. SBO-resolved


2. Sepsis/UTI


3. Atrial fibrillation on OAC currently on hold since 1/4/18


4. LAGC Esophagitis 


























Plan: 


-Continue supportive care 


- SBO obstruction resolved after NGT decompression


- s/p push enteroscopy with normal duodenum and jejunum on evaluation


- LAGC esophagitsis, continue with PPI BID


- H/H stable


- OAC on hold since 1/4/18 for coffee ground emesis, can resume OAC per GI 

standpoint, continue antacid 


- Antibiotic therapy for UTI as per ID


- Bowel regimen with miralax bid


- Outpt followup in 3months for repeat EGD 


- Please call with any questions or concerns














<Victoriano Medina - Last Filed: 01/09/18 09:03>





Objective





- Vital Signs/Intake and Output


Vital Signs (last 24 hours): 


 











Temp Pulse Resp BP Pulse Ox


 


 97.7 F   75   20   156/81 H  97 


 


 01/09/18 08:32  01/09/18 08:32  01/09/18 08:32  01/09/18 08:32  01/09/18 08:32








Intake and Output: 


 











 01/09/18 01/09/18





 06:59 18:59


 


Intake Total 800 


 


Output Total 600 


 


Balance 200 














- Medications


Medications: 


 Current Medications





Acetaminophen (Tylenol 325mg Tab)  650 mg PO Q6 PRN


   PRN Reason: Fever >100.4 F


   Last Admin: 01/04/18 00:17 Dose:  650 mg


Albuterol/Ipratropium (Duoneb 3 Mg/0.5 Mg (3 Ml) Ud)  3 ml INH RQ6 PRN


   PRN Reason: Cough


Amiodarone HCl (Cordarone)  200 mg PO DAILY [facility]


   Last Admin: 01/08/18 11:16 Dose:  200 mg


Amlodipine Besylate (Norvasc)  10 mg PO DAILY [facility]


   Last Admin: 01/08/18 11:16 Dose:  10 mg


Aspirin (Aspirin Chewable)  81 mg PO DAILY [facility]


   Last Admin: 01/08/18 11:16 Dose:  81 mg


Dextrose (Dextrose 50% Inj)  0 ml IV STAT PRN; Protocol


   PRN Reason: Hypoglycemia Protocol


Dextrose (Glutose 15)  0 gm PO ONCE PRN; Protocol


   PRN Reason: Hypoglycemia Protocol


Glucagon (Glucagen Diagnostic Kit)  0 mg IM STAT PRN; Protocol


   PRN Reason: Hypoglycemia Protocol


Heparin Sodium (Porcine) (Heparin)  5,000 units SC Q12 [facility]


   Last Admin: 01/04/18 13:29 Dose:  Not Given


Sodium Chloride (Sodium Chloride 0.9%)  1,000 mls @ 75 mls/hr IV .[id number] [facility]


   Last Admin: 01/09/18 00:46 Dose:  75 mls/hr


Cefepime HCl (Maxipime Iv 1 Gm Premix)  1 gm in 50 mls @ 100 mls/hr IVPB Q12H 

[facility]


   Last Admin: 01/09/18 06:36 Dose:  100 mls/hr


Insulin Glargine (Lantus)  9 unit SC HS [facility]


   Last Admin: 01/08/18 21:27 Dose:  9 units


Insulin Human Regular (Novolin R)  0 unit SC ACHS [facility]


   PRN Reason: Protocol


   Last Admin: 01/08/18 21:24 Dose:  Not Given


Metoprolol Succinate (Toprol Xl)  50 mg PO DAILY [facility]


   Last Admin: 01/08/18 11:16 Dose:  50 mg


Metoprolol Succinate (Toprol Xl)  25 mg PO DAILY [facility]


Metoprolol Tartrate (Lopressor)  5 mg IVP Q6H PRN


   PRN Reason: Systolic Blood Pressure


Omega-3-Acid Ethyl Esters (Lovaza)  1 gm PO BID [facility]


   Last Admin: 01/08/18 18:34 Dose:  1 gm


Ondansetron HCl (Zofran Inj)  4 mg IVP Q6H PRN


   PRN Reason: Nausea/Vomiting


   Last Admin: 01/04/18 03:42 Dose:  4 mg


Oxybutynin Chloride (Ditropan Xl)  5 mg PO DAILY [facility]


   Last Admin: 01/08/18 11:15 Dose:  5 mg


Pantoprazole Sodium (Protonix Ec Tab)  40 mg PO BID [facility]


   Last Admin: 01/08/18 17:58 Dose:  40 mg


Polyethylene Glycol (Miralax)  17 gm PO BID [facility]


Rivaroxaban (Xarelto)  20 mg PO DAILY [facility]


Rosuvastatin Calcium (Crestor)  10 mg PO HS [facility]


   Last Admin: 01/08/18 21:23 Dose:  10 mg


Saccharomyces Boulardii (Florastor)  250 mg PO BID [facility]


   Last Admin: 01/08/18 18:34 Dose:  250 mg


Sitagliptin Phosphate (Januvia)  50 mg PO DAILY [facility]


   Last Admin: 01/08/18 11:23 Dose:  Not Given











- Labs


Labs: 


 





 01/09/18 07:21 





 01/09/18 07:21 





 











PT  13.4 SECONDS (9.7-12.2)  H  01/04/18  13:46    


 


INR  1.2   01/04/18  13:46    














Attending/Attestation





- Attestation


I have personally seen and examined this patient.: Yes


I have fully participated in the care of the patient.: Yes


I have reviewed all pertinent clinical information, including history, physical 

exam and plan: Yes


Notes (Text): 





01/09/18 09:00


I have seen and examined patient with GI fellow.  No acute events overnight, he 

is seen resting in bed comfortably.  No bowel movements yesterday or overnight, 

but he is passing gas and denies abdominal pain.  s/p enteroscopy yesterday 

without significant small bowel findings, +esophagitis.  Review of vitals from 

today shows elevated BP.





UTI, bacteremia


Small bowel obstruction - etiology unclear, possibly secondary to resolving 

enteritis


Atrial fibrillation





- Diet as tolerated


- Continue with PPI therapy


- Await biopsy results from EGD/enteroscopy


- Continue with antibiotic therapy as per medical team


- No further planned GI intervention, suggest additional outpatient follow up.  

Will sign off case, please reconsult as necessary, thank you.

## 2018-01-09 NOTE — CP.PCM.PN
<Geneva Eason - Last Filed: 18 15:06>





Subjective





- Date & Time of Evaluation


Date of Evaluation: 18


Time of Evaluation: 11:38





- Subjective


Subjective: 





Medicine Progress Note: Hospitalist Service





Patient seen and examined at bedside. Per nursing no acute events overnight. 

Patient is constipated, states that he has not had a BM in 9 days. He is 

passing flatus and tolerating diet. Denies headaches, dizziness, cp, 

palpitations, abdominal pain, urinary symptoms.





Objective





- Vital Signs/Intake and Output


Vital Signs (last 24 hours): 


 











Temp Pulse Resp BP Pulse Ox


 


 97.7 F   75   20   156/81 H  97 


 


 18 08:32  18 08:32  18 08:32  18 08:32  18 08:32








Intake and Output: 


 











 18





 06:59 18:59


 


Intake Total 800 


 


Output Total 600 


 


Balance 200 














- Medications


Medications: 


 Current Medications





Acetaminophen (Tylenol 325mg Tab)  650 mg PO Q6 PRN


   PRN Reason: Fever >100.4 F


   Last Admin: 18 00:17 Dose:  650 mg


Albuterol/Ipratropium (Duoneb 3 Mg/0.5 Mg (3 Ml) Ud)  3 ml INH RQ6 PRN


   PRN Reason: Cough


Amiodarone HCl (Cordarone)  200 mg PO DAILY [facility]


   Last Admin: 18 10:31 Dose:  200 mg


Amlodipine Besylate (Norvasc)  10 mg PO DAILY [facility]


   Last Admin: 18 10:31 Dose:  10 mg


Aspirin (Aspirin Chewable)  81 mg PO DAILY [facility]


   Last Admin: 18 10:44 Dose:  81 mg


Dextrose (Dextrose 50% Inj)  0 ml IV STAT PRN; Protocol


   PRN Reason: Hypoglycemia Protocol


Dextrose (Glutose 15)  0 gm PO ONCE PRN; Protocol


   PRN Reason: Hypoglycemia Protocol


Glucagon (Glucagen Diagnostic Kit)  0 mg IM STAT PRN; Protocol


   PRN Reason: Hypoglycemia Protocol


Heparin Sodium (Porcine) (Heparin)  5,000 units SC Q12 [facility]


   Last Admin: 18 13:29 Dose:  Not Given


Sodium Chloride (Sodium Chloride 0.9%)  1,000 mls @ 75 mls/hr IV .[id number] [facility]


   Last Admin: 18 00:46 Dose:  75 mls/hr


Cefepime HCl (Maxipime Iv 1 Gm Premix)  1 gm in 50 mls @ 100 mls/hr IVPB Q12H 

[facility]


   Last Admin: 18 06:36 Dose:  100 mls/hr


Insulin Glargine (Lantus)  9 unit SC [facility]


   Last Admin: 18 21:27 Dose:  9 units


Insulin Human Regular (Novolin R)  [facility]


   PRN Reason: Protocol


   Last Admin: 18 10:30 Dose:  3 unit


Metoprolol Succinate (Toprol Xl)  50 mg PO DAILY [facility]


   Last Admin: 18 10:56 Dose:  50 mg


Metoprolol Succinate (Toprol Xl)  25 mg PO DAILY [facility]


   Last Admin: 18 10:31 Dose:  25 mg


Omega-3-Acid Ethyl Esters (Lovaza)  1 gm PO BID [facility]


   Last Admin: 18 10:31 Dose:  1 gm


Ondansetron HCl (Zofran Inj)  4 mg IVP Q6H PRN


   PRN Reason: Nausea/Vomiting


   Last Admin: 18 03:42 Dose:  4 mg


Oxybutynin Chloride (Ditropan Xl)  5 mg PO DAILY [facility]


   Last Admin: 18 10:45 Dose:  5 mg


Pantoprazole Sodium (Protonix Ec Tab)  40 mg PO BID [facility]


   Last Admin: 18 10:31 Dose:  40 mg


Polyethylene Glycol (Miralax)  17 gm PO BID [facility]


   Last Admin: 18 10:32 Dose:  17 gm


Rivaroxaban (Xarelto)  20 mg PO DAILY [facility]


Rosuvastatin Calcium (Crestor)  10 mg PO HS [facility]


   Last Admin: 18 21:23 Dose:  10 mg


Saccharomyces Boulardii (Florastor)  250 mg PO BID [facility]


   Last Admin: 18 10:44 Dose:  250 mg


Sitagliptin Phosphate (Januvia)  50 mg PO DAILY [facility]


   Last Admin: 18 10:31 Dose:  50 mg











- Labs


Labs: 


 





 18 07:21 





 18 07:21 





 











PT  13.4 SECONDS (9.7-12.2)  H  18  13:46    


 


INR  1.2   18  13:46    














- Constitutional


Appears: Well, No Acute Distress





- Head Exam


Head Exam: ATRAUMATIC, NORMAL INSPECTION





- Eye Exam


Eye Exam: EOMI, Normal appearance





- ENT Exam


ENT Exam: Mucous Membranes Moist





- Neck Exam


Neck Exam: Full ROM





- Respiratory Exam


Respiratory Exam: Clear to Ausculation Bilateral, NORMAL BREATHING PATTERN.  

absent: Rales, Rhonchi, Wheezes





- Cardiovascular Exam


Cardiovascular Exam: REGULAR RHYTHM, +S1, +S2





- GI/Abdominal Exam


GI & Abdominal Exam: Distended, Soft, Normal Bowel Sounds.  absent: Firm, 

Guarding, Rigid, Tenderness





- Rectal Exam


Rectal Exam: Deferred





-  Exam


Additional comments: 





Sky in draining clear yellow urine 





- Extremities Exam


Extremities Exam: Normal Capillary Refill.  absent: Calf Tenderness


Additional comments: 





+SCDs





- Neurological Exam


Neurological Exam: Alert, Awake


Neuro motor strength exam: Left Upper Extremity: 0, Right Upper Extremity: 5, 

Left Lower Extremity: 3, Right Lower Extremity: 5





- Psychiatric Exam


Psychiatric exam: Normal Affect, Normal Mood





- Skin


Skin Exam: Dry, Normal Color, Warm





Assessment and Plan





- Assessment and Plan (Free Text)


Assessment: 





(1) Small bowel obstruction vs Ileus, Enteritis, Possible Coffee ground emesis


Assessment and Plan:


* Patient had moderately distended abdomen with episode of coffee ground emesis 


* CT abd/pelvis: distended stomach, mild proximal small bowel wall thickening, 

correlate clinically for possible enteritis (per discussion with Radiologist)


* General Surgery Consulted, Dr Kulkarni, help appreciated


* NGT was placed on 18, approx 550cc of coffee ground emesis in cannister, 

NGT removed 18


* GI consulted, Dr Khoury, help appreciated- Enteritis likely was causing 

transient SBO


* Patient went for EGD 18 - showed erosive esophagitis and gastritis, will f

/u biopsy results, Per GI patient will need repeat EGD in 3 months


* Per GI, okay to restart anticoagulation


* Continue Protonix 40mg PO BID


* Diet GI altered diet 


* Continue gentle hydration with NS @ 75cc/hr


* Continue to monitor bowel function, states that he has not had a BM in 9 days


* Increased Miralax 17gm PO BID


* Added lactulose x 1 dose this morning





(2) Sepsis


Assessment and Plan: 


* Secondary to Urinary tract infection, Pneumonia, Bacteremia


* Code Sepsis 18: 11:40 AM in ED. Criteria - elevated WBC, elevated HR, 

Fever, elevated Lactate. In the ED -> Aztreonam, Vancomycin, NS bolus.


* Patient not given fluid as 30 mg/kg given concern for congestive heart 

failure.


* Infectious Disease on consult, Dr Quinteros, help appreciated 


* Leukocytosis resolved 


* Antibiotics: Cefepime 1gm Q12H, Florastor 250mg PO BID, Zosyn 2.25 mg IVP Q8H 

(discontinued), Vancomycin 1g IVP Q24H (discontinued)


* Tylenol 650mg PO Q6H PRN for fever


* Continue Gentle hydration with NS @ 75 cc/hr


* 18 Urine Cx growing enterobacter aerogenes


* 18 Repeat Urine Cx showing no growth  


* 18 Blood Cx (10:45am): No growth x 5 days


* 18 Blood Cx (11:00am): Growing Streptococcus Mitis and Coagulase Negative 

Staphlococcus (likely contaminant)


* 1/3/18 Repeat Blood Cx showing no growth after 5 days x 2


* 1/3/18 Initial Sputum Cx contaminated


* 18 Repeat sputum cx, gram stain showing few polymorphonuclear WBCs, few 

yeast, few gram + cocci


* Throat culture showing no growth, Flu negative, urine legionella negative


* Mycoplasma IgG 1.84 (high), Mycoplasma IgM 30 (within normal limits)


* Procal 0.80, Lactic acid normalized


* Duonebs PRN shortness of breathe


* Oxygen 3 Liter for nasal cannula


* Imaging:


 * CT chest w/o contrast: No consolidative infiltrate suggested, Mild bibasilar 

peripheral subplerual septal lines noted, Bilateral renal masses, There are at 

least 2 right renal masses that appear hypodense (please see full report)


 * Bladder US: Renal cysts; right kidney 11.3x7.6x10.2cm cyst, 1.3x1.3.1.2cm 

cyst; left kidney 1.3x.10.0.9cm cyst 


 * CXR on admission: No consolidation, cardiomegaly with limited visualization 

of the left costophrenic angle (see full report)


 * CT abd/pelvis: 11.1 x 10.2 cm complex right cystic lesion with evidence of 

calcification and septation. 7mm right upper pole echogenic lesion, 

indeterminate. 17mm low-density, left upper pole kidney 


 * CXR 17: suspect left lower pneumonia, F/U PA/Lateral radiographs 

recommended


Status: Acute   Priority: High   





(3) History of Atrial fibrillation


Assessment and Plan: 


* Hx of Pacemaker (or AICD)


* CHADS score 5 -> 12.5% risk of event per yr w/o AO; HASBLED 4 -> Patient at 

high risk for major bleeding


* Aspirin 81mg PO QDaily


* Metoprolol Succinate 75mg PO QDaily


* Amiodarone 200mg PO QDaily 


* TSH and Free T4 within normal limits


* Patient sees Dr Asutdillo outpatient


* Per discussion with Dr Dent, patient is supposed to be on Xarelto 20mg PO 

daily, however currently on hold 


* Cardiology on consult, Dr Chapman, help appreciated


Status: Chronic   Priority: High   





(4) Cardiomegaly


Assessment and Plan: 


* Echo from  showed normal EF and some possible diastolic dysfunction


* Repeat Echo completed, LVEF 50-55%, hypertensive heart disease, diastolic 

dysfunction, mild MR, moderate pulmonary hypertension


Status: Chronic   Priority: High   





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


Assessment and Plan: 


* Aspirin 81mg PO QD 


* Crestor 10 mg PO HS 


* PT and OT on board


Status: Chronic   Priority: Medium   





(6) Diabetes mellitus


Assessment and Plan: 


* Lantus 9 units HS 


* ISS medium dose, Accuchecks Q6H


* HgbA1C 8.1


* Januvia 50mg PO Daily


* HOLD metformin 1000mg PO BID 2/2 mild elevation in lactate


* Hypoglycemia protocol


Status: Chronic   Priority: Medium   





(7) HTN (hypertension)


Assessment and Plan: 


* BPs have been fluctuating. SBPs up to 150-160s at times


* ContinueToprol XL to 75mg PO daily 


* Norvasc 10mg PO Daily


* Monitor vitals 


Status: Chronic   Priority: High   





(8) Lipid disorder


Assessment and Plan: 


* Lipid panel- T, Chol: 83, LDL<30, HDL: 27


* Lovaza 1g PO BID 


* Crestor 10 mg PO HS 


Status: Chronic 





(9) Abnormal urinalysis


       History of Urinary Incontinence


Assessment and Plan: 


* History of Urinary Incontinence


* Straight cath in ED with 200ml of normal appearing urine given he appeared he 

had difficulty urinating in urinal


* Bladder scan PRN


* Urine culture growing enterobacter aerogenes, sensitivities reviewed


* Repeat UA 18: +1 ketones, +1 blood, +2 Leuk esterase, 24 WBCs


* Repeat Urine culture showing no growth 


* Contiue Oxybutynin XL 5mg PO Daily 


* CT Abdomen/Pelvis (w/o contrast): 11.11 X 10.2cm complex right cystic lesion 

with calcification and septation. nonobstructing left renal calculus. 

Distension of the stomach. Mild proximal small bowel wall thickening. Blood 

loop appear within normal limits of caliber


* Per PMD, in  patient had right renal cyst that measured 9cm


* Urology on consult, Dr Bender, help appreciated 


* Per Urology with order CT chest/abd with IV and PO contrast to further 

evaluation renal mass vs renal cyst


Status: Acute   





(10) Hypokalemia


Assessment and Plan: 


*  Repleted, Will continue to monitor


 


(11) Prophylactic measure


Assessment and Plan: 


* Protonix 40mg PO BID


* SCDs


* Restart Heparin 5000u SC Q8H 


* Florastor 250mg PO BID 


* PT/OT eval and treat: recommend for KITTY, patient wants to go home, Script for 

Home PT left in the chart








Disposition: When medically stable for discharge, patient will go home with 

home PT. At this time we are awaiting further evaluation of right renal cyst vs 

mass and return of bowel function. 








<Juliann Bird V - Last Filed: 18 00:15>





Objective





- Vital Signs/Intake and Output


Vital Signs (last 24 hours): 


 











Temp Pulse Resp BP Pulse Ox


 


 98.5 F   83   20   153/81 H  95 


 


 18 15:00  18 15:00  18 15:00  18 15:00  18 15:00








Intake and Output: 


 











 18





 18:59 06:59


 


Intake Total  250


 


Output Total 800 


 


Balance -800 250














- Labs


Labs: 


 





 18 07:21 





 18 07:21 





 











PT  13.4 SECONDS (9.7-12.2)  H  18  13:46    


 


INR  1.2   18  13:46    














Attending/Attestation





- Attestation


I have personally seen and examined this patient.: Yes


I have fully participated in the care of the patient.: Yes


I have reviewed all pertinent clinical information, including history, physical 

exam and plan: Yes


Notes (Text): 


This is late computer entry for 18


Patient seen, examined, and case discussed with day-time resident.


Patient seen this morning with daughter at bedside. Patient reports he is 

feeling good. He reports flatus denies bowel movements.


Patient status post EGD wheren erosive esophagitis and gastritis. Patient will 

need to follow-up biopsy and repeat EGD in 3 months.


Patient's blood presure uncontrolled this morning. WIll need to follow-up 

vitials to see if we need to increase Toprol XL.


Patient recommended by urology for CT abdomen/pelvis with PO/IV contrast to 

further characterize renal cyst.


Patient and family does not want to go to [facility], wants to go home on discharge. 

Will need to follow-up with infectious disease.





Assessment/Plan


(1) Sepsis


Assessment and Plan: 


* Suspecting:urinary tract infection, possible bacteremia, pneumonia


* Code Sepsis: 11:40 AM on 18 in ED. Criteria - elevated WBC, elevated HR, 

Fever, elevated Lactate. In the ED -> Aztreonam, Vancomycin, NS bolus.


* Patient not given fluid as 30 mg/kg given concern for congestive heart 

failure.


* Infectious Disease consult, Dr Quinteros on board


 * D/c Zosyn and Vanco


* Pending official read of echocardiogram


* Flu NEGATIVE


* Blood culture (18 11:00): Strepococcus Mitis and Coag Neg Staph sensitive 

to


* Blood culture (18 10:45): no growth after 5 days


* Blood culture (1/3/18): No growth X2





* Urine culture (18): Enterobacter Aerogenes-->HANNAH for Cefepime, Invanz, 

and Ciprofloxocin


* Urine culture (18): no growth





* Negative for strep throat


* Sputum (1/3/17): contaminated


* F/U strep pnumoniae Ag, mycoplasma IgM, legionella Ag: negative


* Procalcitonin: 0.80


 Imaging:


 * CT chest w/o contrast (18): no consolidative infiltrate suggested, mild 

bibasilar peripheral subpleural septal lines. b/l renal masses. no gross 

hydronephrosis appreciated. few small left parapelvic renal cyst possible, mild 

perirenal stranding inflammatory changes present


 * Chest xray (18): suspected left lower lobe pneumonia.


 


* Duonebs PRN shortness of breathe


* Oxygen 3 Liter for nasal cannula


* Cefepime 1gram IVPB Q12 (active since 18)





Meds:


Tylenol 650mg PO Q6H PRN for fever





Status: Acute   Priority: High   





(2) Small Bowel Obstruction


       Enteritis


       Erosive esophagitis


       Gastritis


Assessment and Plan: 


* General surgery (Dr. Kulkarni) on board-->help appreciated


* GI (Dr. Medina) on board-->help appreciated


* NGT placed 18 550 abdominal contents question of coffee ground emesis 

removed. Abdomen exam improved.


* CT Abdomen/Pelvis (w/o contrast): 11.11 X 10.2cm complex right cystic lesion 

with calcification and septation. nonobstructing left renal calculus. 

Distension of the stomach. Mild proximal small bowel wall thickening. Blood 

loop appear within normal limits of caliber


* Aspirin, Xarelto held in light of possible coffee ground emesis 18


* Medications switched to IV if possible


* Patient reports flatus. Has not had bowel movement. NGT tube removed on 


* Cefepime 1gram IV Q12H (active since 18)


* Per GI, recommended f/u biopsy and repeat EGD in 3 months. Diet advanced 

today. Will need t monitor for bowel movement.


Status: Acute   Priority: High 





(3) History of Atrial fibrillation


Assessment and Plan: 


* Hx of Pacemaker (or AICD)


* Cardiology consult, Dr Chapman


* CHADS 5 -> 12.5% risk of event per yr w/o AO; HASBLED 4 -> Patient at high 

risk for major bleeding


* c/w  Aspirin 81mg PO QDaily


* c/w  Metoprolol Succinate 75mg PO QDaily


* c/w  Amiodarone 200mg PO QDaily  secondary to vomitting/SBO


* TSH/Free T4


* Resident has spoken with patient's family physician: chemical anticoagulation 

had be discontinued secondary to falls on 18


Status: Chronic   Priority: High   





(4) Cardiomegaly


Assessment and Plan: 


* Echo from  showed normal EF and some possible diastolic dysfunction


* F/U Echo--Pending official read; cardiology on call noted normal V function, 

and mild MR


* Gentle IV hydration


* CT chest w/o contrast (18): no consolidative infiltrate suggested, mild 

bibasilar peripheral subpleural septal lines. b/l renal masses. no gross 

hydronephrosis appreciated. few small left parapelvic renal cyst possible, mild 

perirenal stranding inflammatory changes present


Status: Acute   Priority: High   





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


Assessment and Plan: 


* c/w Aspirin 81mg PO QDaily-->possible coffee ground emesis


* c/w  Crestor 10 mg PO HS (Patient home Lipitor 20mg not carried in house)


* Blood pressure control with Lopressor IV PRN


* PT and OT on board


Status: Chronic   Priority: Medium   





(5) Diabetes mellitus


Assessment and Plan: 


* Accuchecks ACHS


* RISS medium dose


* Consistent carb diet


* HgbA1C: 8.1


* c/w  Glimepiride 4mg PO QDaily


* c/w Januvia 100mg PO QDaily


* HOLD metformin 1000mg PO BID 2/2 mild elevation in lactate


* Lipid panel: T, Chol: 83, LDL<30, HDL: 27


* Hypoglycemic protocol


* Switch to Accuchecks Q6H


* Lower Lantus 9 units (prior 18 units) subqHS to prevent hypoglycemia


Status: Chronic   Priority: Medium   





(6) HTN (hypertension)


Assessment and Plan: 


* Lopressor 5mg IV Q 6H PRN SBP>160


* c/w Metoprolol succinate 75mg PO QDaily


* hold Norvasc 10mg PO QDaily secondary to vomitting/SBO


* HOLD home benazepril 40mg 2/2 to elevated potassium


Status: Chronic   Priority: High   





(7) Lipid disorder


Assessment and Plan: 


* Lipid panel: T, Chol: 83, LDL<30, HDL: 27


* hold Lovaza 1g PO BID secondary to vomitting/SBO


* c/w Crestor 10 mg PO HS (Patient home Lipitor 20mg not carried in house)


Status: Chronic 





(8) Urinary Tract Infection


       History of Renal Cyst


Assessment and Plan: 


* History of Urinary Incontinence


* Straight cath in ED with 200ml of normal appearing urine given he appeared he 

had difficulty urinating in urinal


* Hold :Oxybutynin XL 5mg PO QDaily secondary to vomitting/SBO


* CT Abdomen/Pelvis (w/o contrast): 11.11 X 10.2cm complex right cystic lesion 

with calcification and septation. nonobstructing left renal calculus. 

Distension of the stomach. Mild proximal small bowel wall thickening. Blood 

loop appear within normal limits of caliber


* Renal US: renal cysts noted


* Resident has confirmed with PMD patient has had renal cyst prior 9cm in --

>18 conversation


* Urine culture (18): no growth


* Urology on board recommending CT abdomen/pelvis PO/IV contrast to further 

characterize renal cyst


Status: Acute   





(9) Prophylactic measure


Assessment and Plan: 


* Protonix 40mg IV Q12H


* SCDs


* resume Heparin 5000u SC Q8H 


* Florastor 250mg PO BID 


* PT/OT eval and treat: recommend for KITTY





Disposition: Will continue IV antibiotic to cover for urinary tract infection, 

pneumonia, enteritis. GI  has signed off. Recommend f/u pathology and repeat 

EGD in 3 months. Family does not want KITTY, they want to bring patient home. 

Will need to follow-up ID in regards to antibiotics on discharge

## 2018-01-09 NOTE — CARD
--------------- APPROVED REPORT --------------





EXAM: Two-dimensional and M-mode echocardiogram with Doppler and 

color Doppler.



Other Information 

Quality : GoodRhythm : 



INDICATION

Atrial Fibrillation Syncope CARDIOMEGALY ON CHEST XRAY



RISK FACTORS

Hypertension 

Diabetes



M-Mode DIMENSIONS 

Left Atrium (MM)5.23   (2.5-4.0cm)IVSd1.13   (0.7-1.1cm)

Aortic Root3.05   (2.2-3.7cm)LVDd6.87   (4.0-5.6cm)

Aortic Cusp Exc.1.64   (1.5-2.0cm)PWd1.17   (0.7-1.1cm)

FS (%) 23   %LVDs5.31   (2.0-3.8cm)

LVEF (%)45   (>50%)



Mitral Valve

MV E Xgtddpjh39.2cm/sMV A Qnahrtmy620.4cm/sE/A ratio0.7



TDI

E/Lateral E'0.0E/Medial E'0.0



Tricuspid Valve

TR Peak [id number]/sTR Peak [id number]



 LEFT VENTRICLE 

The left ventricle is normal size.

There is mild concentric left ventricular hypertrophy.

Left ventricle systolic function is normal.

The Ejection Fraction is 50-55%.

There is normal LV segmental wall motion.

Tissue Doppler imaging reveals abnormal left ventricular diastolic 

dysfunction.



 RIGHT VENTRICLE 

The right ventricle is normal size.

There is normal right ventricular wall thickness.

The right ventricular systolic function is normal.



 ATRIA 

The left atrium is mildly dilated.

The right atrium size is normal.

The interatrial septum is intact with no evidence for an atrial 

septal defect.



 AORTIC VALVE 

The aortic valve is mildly thickened but opens well.

No aortic regurgitation is present.

There is no aortic valvular stenosis. 



 MITRAL VALVE 

The mitral valve is normal in structure.

There is no evidence of mitral valve prolapse.

There is no mitral valve stenosis.

Mitral regurgitation is mild.



 TRICUSPID VALVE 

The tricuspid valve is normal in structure.

There is mild tricuspid regurgitation.

Right ventricular systolic pressure is estimated at 40-50 mmHg. 

There is moderate pulmonary hypertension.



 PULMONIC VALVE 

The pulmonic valve is not well visualized.

There is no pulmonic valvular regurgitation. 



 GREAT VESSELS 

The aortic root is normal in size.



 PERICARDIAL EFFUSION 

There is no significant pericardial effusion.



<Conclusion>

Left ventricle systolic function is normal.

The Ejection Fraction is 50-55%.

Hypertensive heart disease.

Diastolic dysfunction.

No aortic regurgitation is present.

Mitral regurgitation is mild.

There is mild tricuspid regurgitation.

There is moderate pulmonary hypertension.

There is no pulmonic valvular regurgitation.

## 2018-01-09 NOTE — CT
EXAM:

  CT Chest With Intravenous Contrast



CLINICAL HISTORY:

  77 years old, male; Signs and symptoms and condition or disease; Kidney or 

ureter condition; Other: Evaluate for renal mass; Mass, lump, or swelling; 

Generalized; Other: Sepsis; Shortness of breath; Additional info: Evaluate 

renal mass



TECHNIQUE:

  Axial computed tomography images of the chest with intravenous contrast.  All 

CT scans at this facility use one or more dose reduction techniques, viz.: 

automated exposure control; ma/kV adjustment per patient size (including 

targeted exams where dose is matched to indication; i.e. head); or iterative 

reconstruction technique.

  Coronal and sagittal reformatted images were created and reviewed.



CONTRAST:

  100 mL of visipaque 320 administered intravenously.



COMPARISON:

  No relevant prior studies available.



FINDINGS:

  Artifacts:  Motion artifact degrades image quality.Streak artifact degrades 

image quality.



  Tubes, lines and devices:  There is a pacemaker in the left chest wall. There 

is streak artifact from pacemaker leads.



  Lungs and pleural spaces:  Trachea and main bronchi are patent.There is no 

pneumothorax. There is dependent atelectasis in both upper lobes. There is more 

extensive airspace disease at the lung bases. There are no definite effusions.

  

  Heart and Vasculature:   Contrast is injected from a peripheral left sided 

approach. 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 There is filling of 

multiple collateral in the left chest wall and left upper abdominal wall. There 

is filling of collaterals in the right upper chest wall and bilateral neck



The heart is enlarged.There is trace fluid in pericardial recesses. There are 

coronary artery calcifications. Ascending aorta is mildly dilated for cm in 

diameter. There is mild ectasia of the mid arch. There are calcifications in 

the aorta and great vessels. Bolus timing limits evaluation of the aorta. Main 

pulmonary artery is normal in caliber. There is central pulmonary emboli. 

Motion limits evaluation of peripheral vessels. There are no large peripheral 

pulmonary emboli.



  Mediastinum:  There are shotty mediastinal nodes. There are no enlarged hilar 

nodes. Esophagus is unremarkable.

  Thyroid:  Thyroid is not optimally demonstrated.

  Bones/joints:  Bony structures are osteopenic. There are degenerative changes.

  .

  

  

  Upper abdomen:  Referred to following report for abdominal findings

  



IMPRESSION:     

  Cardiomegaly and atherosclerotic disease with pacemaker/defibrillator; mild 

aneurysmal dilatation of the aorta, no leak; no central pulmonary emboli, 

limited evaluation of peripheral vessels; the occlusion of the left subclavian 

vein with filling of multiple collaterals in the chest wall and mediastinum; 

basilar airspace disease atelectasis and/or infiltrate



Additional findings as described above.

_______________________________________________



EXAM:

  CT Abdomen and Pelvis With Intravenous Contrast



EXAM DATE/TIME:

  1/9/2018 11:19 AM



CLINICAL HISTORY:

  77 years old, male; Signs and symptoms and condition or disease; Kidney or 

ureter condition; Other: Evaluate for renal mass; Mass, lump, or swelling; 

Generalized; Other: Sepsis; Shortness of breath; Additional info: Evaluate 

renal mass



TECHNIQUE:

  Axial computed tomography images of the abdomen and pelvis with intravenous 

contrast.  All CT scans at this facility use one or more dose reduction 

techniques, viz.: automated exposure control; ma/kV adjustment per patient size 

(including targeted exams where dose is matched to indication; i.e. head); or 

iterative reconstruction technique.

  Coronal and sagittal reformatted images were created and reviewed.



CONTRAST:

  100 mL of visipaque 320 administered intravenously.



COMPARISON:

  CT - CHEST W/O CONTRAST 2018-01-02 15:55



FINDINGS:

  Lower thorax:  Refer to prior report for chest findings



 ABDOMEN:

  Liver:  unremarkable

  Gallbladder and bile ducts:  Gallbladder is absent. There is prominence of 

the common duct.

  Pancreas:  Pancreas is mildly atrophic.

  Spleen:  Spleen is unremarkable.  There is an accessory spleen in the left 

upper quadrant.

  Adrenals:  unremarkable

  Kidneys and ureters:  There is a large right mid/lower pole renal cyst. 

Maximal measurement is approximately 11 x 10 cm. There is a small adjacent cyst 

with minimal calcification in the medial wall. Adjacent cyst measures 

approximately 4 x 2 cm. There is 11 mm hyperdense peripheral right renal lesion 

most likely hyperdense cyst. There are multiple additional smaller right renal 

cysts.  

There are multiple left renal cysts. There are 2 small peripheral left renal 

hyperdense lesions most likely hyperdense cysts.There are additional small low 

attenuation left renal lesions too small to accurately characterize most likely 

cysts

There is bilateral nonspecific perinephric stranding. There is no 

pelvocaliectasis or ureterectasis.  

 

  Stomach and bowel:  Stomach is partially distended. Rotation is normal. There 

is fluid, air and contrast throughout the small bowel. There is no small bowel 

obstruction. Terminal ileum is unremarkable. Appendix is not visualized. There 

is no pericecal inflammation. There is moderate stool and air in the colon. 

There is scattered diverticulosis.

  Appendix:  See stomach and bowel



 PELVIS:

  Bladder:  Bladder is partially distended. There is mild prominence of the 

bladder wall.

  Reproductive:  Prostate is mildly enlarged. There is prominence of the 

seminal vesicles.



 ABDOMEN and PELVIS:

  Intraperitoneal space:  There is no free air or free fluid.

  Bones/joints:  Bony structures are osteopenic. There is partial ankylosis of 

the left sacroiliac joint. There are degenerative changes.

  Soft tissues:  unremarkable

  Vasculature:  There are vascular calcifications.

  Lymph nodes:  There is shotty adenopathy.



IMPRESSION:  Multiple bilateral renal cysts with an 11 x 10 cm simple right mid 

to lower pole renal cyst, no hydronephrosis; cholecystectomy

## 2018-01-09 NOTE — PCM.URO
Urology Progress Note





- Objective


Lab Results Last 24 Hours: 


 Laboratory Results - last 24 hr











  01/02/18 01/08/18 01/08/18





  16:45 11:10 16:14


 


WBC   


 


RBC   


 


Hgb   


 


Hct   


 


MCV   


 


MCH   


 


MCHC   


 


RDW   


 


Plt Count   


 


MPV   


 


Neut % (Auto)   


 


Lymph % (Auto)   


 


Mono % (Auto)   


 


Eos % (Auto)   


 


Baso % (Auto)   


 


Neut #   


 


Lymph #   


 


Mono #   


 


Eos #   


 


Baso #   


 


Sodium   


 


Potassium   


 


Chloride   


 


Carbon Dioxide   


 


Anion Gap   


 


BUN   


 


Creatinine   


 


Est GFR ( Amer)   


 


Est GFR (Non-Af Amer)   


 


POC Glucose (mg/dL)   211 H  266 H


 


Random Glucose   


 


Calcium   


 


Phosphorus   


 


Magnesium   


 


Total Bilirubin   


 


AST   


 


ALT   


 


Alkaline Phosphatase   


 


Total Protein   


 


Albumin   


 


Globulin   


 


Albumin/Globulin Ratio   


 


Ur Strep pneumoniae Ag  Not detected  














  01/08/18 01/09/18 01/09/18





  21:18 06:20 07:21


 


WBC    8.3


 


RBC    4.73


 


Hgb    12.6


 


Hct    38.2


 


MCV    80.8


 


MCH    26.6 L


 


MCHC    32.9 L


 


RDW    14.4


 


Plt Count    233


 


MPV    9.5


 


Neut % (Auto)    59.7


 


Lymph % (Auto)    22.1


 


Mono % (Auto)    13.4 H


 


Eos % (Auto)    3.9


 


Baso % (Auto)    0.9


 


Neut #    4.9


 


Lymph #    1.8


 


Mono #    1.1 H


 


Eos #    0.3


 


Baso #    0.1


 


Sodium   


 


Potassium   


 


Chloride   


 


Carbon Dioxide   


 


Anion Gap   


 


BUN   


 


Creatinine   


 


Est GFR ( Amer)   


 


Est GFR (Non-Af Amer)   


 


POC Glucose (mg/dL)  213 H  207 H 


 


Random Glucose   


 


Calcium   


 


Phosphorus   


 


Magnesium   


 


Total Bilirubin   


 


AST   


 


ALT   


 


Alkaline Phosphatase   


 


Total Protein   


 


Albumin   


 


Globulin   


 


Albumin/Globulin Ratio   


 


Ur Strep pneumoniae Ag   














  01/09/18





  07:21


 


WBC 


 


RBC 


 


Hgb 


 


Hct 


 


MCV 


 


MCH 


 


MCHC 


 


RDW 


 


Plt Count 


 


MPV 


 


Neut % (Auto) 


 


Lymph % (Auto) 


 


Mono % (Auto) 


 


Eos % (Auto) 


 


Baso % (Auto) 


 


Neut # 


 


Lymph # 


 


Mono # 


 


Eos # 


 


Baso # 


 


Sodium  140


 


Potassium  3.4 L


 


Chloride  106


 


Carbon Dioxide  30


 


Anion Gap  7 L


 


BUN  16


 


Creatinine  1.0


 


Est GFR ( Amer)  > 60


 


Est GFR (Non-Af Amer)  > 60


 


POC Glucose (mg/dL) 


 


Random Glucose  221 H


 


Calcium  8.0 L


 


Phosphorus  3.1


 


Magnesium  1.9


 


Total Bilirubin  0.7


 


AST  22


 


ALT  30


 


Alkaline Phosphatase  62


 


Total Protein  6.4


 


Albumin  3.2 L


 


Globulin  3.2


 


Albumin/Globulin Ratio  1.0


 


Ur Strep pneumoniae Ag 











Intake & Output: 


 Intake & Output











 01/08/18 01/09/18 01/09/18





 18:59 06:59 18:59


 


Intake Total 1380 800 


 


Output Total 1000 600 


 


Balance 380 200 


 


Intake:   


 


    


 


  Intake, IV Amount 400 400 


 


    Right Wrist 400 400 


 


  Oral 480 400 


 


Output:   


 


  Urine 1000 600 


 


    Condom 1000 600 











Vital Signs: 


 Vital Signs - 24 hr











  01/08/18 01/08/18 01/08/18





  10:50 15:35 21:03


 


Temperature 98.9 F 98.7 F 


 


Pulse Rate 79 88 88


 


Respiratory 20 20 





Rate   


 


Blood Pressure 162/80 H 160/77 H 


 


O2 Sat by Pulse 94 L 95 





Oximetry   














  01/08/18 01/08/18 01/09/18





  23:25 23:40 08:32


 


Temperature 98.6 F  97.7 F


 


Pulse Rate 84 86 75


 


Respiratory 20  20





Rate   


 


Blood Pressure 156/77 H  156/81 H


 


O2 Sat by Pulse 94 L  97





Oximetry

## 2018-01-09 NOTE — PN
DATE:  01/08/2018



SUBJECTIVE:  The patient was seen today.  He has stroke and he is not able

to communicate, but he was lying in bed.  His NG tube has been out now.  He

has left hemiparesis.



PHYSICAL EXAMINATION:

VITAL SIGNS:  Temperature is 99.6, pulse 88, blood pressure 135/72,

respirations are 20, pulse is 95.

HEENT:  Head is atraumatic, normocephalic.

NECK:  Supple.

LUNGS:  Clear.  No crackles or rales present.  Decreased breath sounds on

left base.

HEART:  S1, S2, is regular.

ABDOMEN:  Soft, nontender.  No guarding, no rigidity present, but is

prominent.

EXTREMITIES:  Has left hemiparesis, has Venodyne boots on.  He has been on

vancomycin and cefepime.



LABORATORY DATA:  His lab show white count is 10.9, hemoglobin 13.4,

hematocrit 39.8, platelet count is 201.  Sodium is 142, potassium 3.9,

chloride is 108, BUN is 17, creatinine is 1.0.  His sugars are 266 now.  In

his reports, he did had abdominal x-ray done on 01/06/2018, which showed no

evidence of dilated small bowel loop or air fluid levels, as he did had

abdominal distension and NG tube placed because of small bowel ileus and

was found to be constipated.  His last chest x-ray showed left lower lobe

infiltrate, he has same mild proximal small bowel thickening, correlate the

possibility of enteritis, bowel loops appear within normal limits of

caliber.  There was a chest x-ray done on 01/05/2018, which showed

suspected left lower lobe infiltrate.  He has been on antibiotics since his

admission.  Micro-wise, he did had urine culture, which had Enterobacter

aerogenes.  His blood culture had Streptococcus mitis, and also

coagulase-negative Staph - I would think is probably contaminant with 2

bugs in it.  Repeat cultures have been negative.  Urine culture is

negative.  Sputum culture is pending at this time.



PLAN:  We will continue present antibiotics.  He has been in the hospital

since the 2nd; today is the 6th day.  He also had a Urology evaluation,

which I am not sure.  I am waiting for their recommendations.  We will

continue present antibiotics and will follow.





__________________________________________

Petey Quinteros MD





DD:  01/08/2018 20:35:05

DT:  01/08/2018 22:58:32

[facility] # [id number]

## 2018-01-10 VITALS — RESPIRATION RATE: 20 BRPM

## 2018-01-10 LAB
ALBUMIN SERPL-MCNC: 3 G/DL (ref 3.5–5)
ALBUMIN/GLOB SERPL: 1 {RATIO} (ref 1–2.1)
ALT SERPL-CCNC: 31 U/L (ref 21–72)
AST SERPL-CCNC: 19 U/L (ref 17–59)
BASOPHILS # BLD AUTO: 0.1 K/UL (ref 0–0.2)
BASOPHILS NFR BLD: 1 % (ref 0–2)
BUN SERPL-MCNC: 15 MG/DL (ref 9–20)
CALCIUM SERPL-MCNC: 7.8 MG/DL (ref 8.6–10.4)
EOSINOPHIL # BLD AUTO: 0.5 K/UL (ref 0–0.7)
EOSINOPHIL NFR BLD: 5.5 % (ref 0–4)
ERYTHROCYTE [DISTWIDTH] IN BLOOD BY AUTOMATED COUNT: 14.4 % (ref 11.5–14.5)
GFR NON-AFRICAN AMERICAN: > 60
HGB BLD-MCNC: 12.7 G/DL (ref 12–18)
LYMPHOCYTES # BLD AUTO: 1.8 K/UL (ref 1–4.3)
LYMPHOCYTES NFR BLD AUTO: 20.1 % (ref 20–40)
MAGNESIUM SERPL-MCNC: 1.9 MG/DL (ref 1.6–2.3)
MCH RBC QN AUTO: 26.6 PG (ref 27–31)
MCHC RBC AUTO-ENTMCNC: 33.1 G/DL (ref 33–37)
MCV RBC AUTO: 80.4 FL (ref 80–94)
MONOCYTES # BLD: 0.9 K/UL (ref 0–0.8)
MONOCYTES NFR BLD: 10 % (ref 0–10)
NEUTROPHILS # BLD: 5.6 K/UL (ref 1.8–7)
NEUTROPHILS NFR BLD AUTO: 63.4 % (ref 50–75)
NRBC BLD AUTO-RTO: 0 % (ref 0–2)
PLATELET # BLD: 232 K/UL (ref 130–400)
PMV BLD AUTO: 9.8 FL (ref 7.2–11.7)
RBC # BLD AUTO: 4.77 MIL/UL (ref 4.4–5.9)
WBC # BLD AUTO: 8.8 K/UL (ref 4.8–10.8)

## 2018-01-10 RX ADMIN — CEFEPIME SCH MLS/HR: 1 INJECTION, SOLUTION INTRAVENOUS at 19:05

## 2018-01-10 RX ADMIN — HUMAN INSULIN SCH UNIT: 100 INJECTION, SOLUTION SUBCUTANEOUS at 08:38

## 2018-01-10 RX ADMIN — OMEGA-3-ACID ETHYL ESTERS SCH GM: 900 CAPSULE, LIQUID FILLED ORAL at 10:15

## 2018-01-10 RX ADMIN — HUMAN INSULIN SCH UNIT: 100 INJECTION, SOLUTION SUBCUTANEOUS at 13:35

## 2018-01-10 RX ADMIN — Medication SCH MG: at 10:14

## 2018-01-10 RX ADMIN — HUMAN INSULIN SCH: 100 INJECTION, SOLUTION SUBCUTANEOUS at 21:46

## 2018-01-10 RX ADMIN — OMEGA-3-ACID ETHYL ESTERS SCH GM: 900 CAPSULE, LIQUID FILLED ORAL at 17:06

## 2018-01-10 RX ADMIN — POLYETHYLENE GLYCOL 3350 SCH: 17 POWDER, FOR SOLUTION ORAL at 17:11

## 2018-01-10 RX ADMIN — PANTOPRAZOLE SODIUM SCH MG: 40 TABLET, DELAYED RELEASE ORAL at 10:21

## 2018-01-10 RX ADMIN — Medication SCH MG: at 17:06

## 2018-01-10 RX ADMIN — METOPROLOL SUCCINATE SCH MG: 50 TABLET, EXTENDED RELEASE ORAL at 10:14

## 2018-01-10 RX ADMIN — INSULIN GLARGINE SCH UNITS: 100 INJECTION, SOLUTION SUBCUTANEOUS at 21:58

## 2018-01-10 RX ADMIN — PANTOPRAZOLE SODIUM SCH MG: 40 TABLET, DELAYED RELEASE ORAL at 17:06

## 2018-01-10 RX ADMIN — CEFEPIME SCH MLS/HR: 1 INJECTION, SOLUTION INTRAVENOUS at 08:37

## 2018-01-10 RX ADMIN — POLYETHYLENE GLYCOL 3350 SCH GM: 17 POWDER, FOR SOLUTION ORAL at 10:14

## 2018-01-10 RX ADMIN — METOPROLOL SUCCINATE SCH MG: 25 TABLET, EXTENDED RELEASE ORAL at 10:14

## 2018-01-10 RX ADMIN — HUMAN INSULIN SCH UNIT: 100 INJECTION, SOLUTION SUBCUTANEOUS at 17:06

## 2018-01-10 NOTE — CP.PCM.PN
Subjective





- Date & Time of Evaluation


Date of Evaluation: 01/10/18


Time of Evaluation: 07:00





- Subjective


Subjective: 





Surgical Progress Note:





Patient was seen and examined at bedside in the AM.  Per nurse no acute events 

overnight.  Per nurse the patient has not had a bowel movement yet.  Patient 

denies nausea or vomiting. 








Objective





- Vital Signs/Intake and Output


Vital Signs (last 24 hours): 


 











Temp Pulse Resp BP Pulse Ox


 


 98.1 F   67   20   151/72 H  97 


 


 01/10/18 07:35  01/10/18 07:35  01/10/18 07:35  01/10/18 07:35  01/10/18 07:35








Intake and Output: 


 











 01/10/18 01/10/18





 06:59 18:59


 


Intake Total 900 


 


Output Total  700


 


Balance 900 -700














- Medications


Medications: 


 Current Medications





Acetaminophen (Tylenol 325mg Tab)  650 mg PO Q6 PRN


   PRN Reason: Fever >100.4 F


   Last Admin: 01/04/18 00:17 Dose:  650 mg


Albuterol/Ipratropium (Duoneb 3 Mg/0.5 Mg (3 Ml) Ud)  3 ml INH RQ6 PRN


   PRN Reason: Cough


Amiodarone HCl (Cordarone)  200 mg PO DAILY [facility]


   Last Admin: 01/09/18 10:31 Dose:  200 mg


Amlodipine Besylate (Norvasc)  10 mg PO DAILY [facility]


   Last Admin: 01/09/18 10:31 Dose:  10 mg


Aspirin (Aspirin Chewable)  81 mg PO DAILY [facility]


   Last Admin: 01/09/18 10:44 Dose:  81 mg


Dextrose (Dextrose 50% Inj)  0 ml IV STAT PRN; Protocol


   PRN Reason: Hypoglycemia Protocol


Dextrose (Glutose 15)  0 gm PO ONCE PRN; Protocol


   PRN Reason: Hypoglycemia Protocol


Glucagon (Glucagen Diagnostic Kit)  0 mg IM STAT PRN; Protocol


   PRN Reason: Hypoglycemia Protocol


Heparin Sodium (Porcine) (Heparin)  5,000 units SC Q12 [facility]


   Last Admin: 01/09/18 21:17 Dose:  5,000 units


Sodium Chloride (Sodium Chloride 0.9%)  1,000 mls @ 75 mls/hr IV .[id number] [facility]


   Last Admin: 01/10/18 04:11 Dose:  75 mls/hr


Cefepime HCl (Maxipime Iv 1 Gm Premix)  1 gm in 50 mls @ 100 mls/hr IVPB Q12H 

[facility]


   Last Admin: 01/10/18 08:37 Dose:  100 mls/hr


Insulin Glargine (Lantus)  9 unit SC [facility]


   Last Admin: 01/09/18 21:17 Dose:  9 units


Insulin Human Regular (Novolin R)  0 unit SC ACHS [facility]


   PRN Reason: Protocol


   Last Admin: 01/10/18 08:38 Dose:  4 unit


Metoprolol Succinate (Toprol Xl)  50 mg PO DAILY [facility]


   Last Admin: 01/09/18 10:56 Dose:  50 mg


Metoprolol Succinate (Toprol Xl)  25 mg PO DAILY [facility]


   Last Admin: 01/09/18 10:31 Dose:  25 mg


Omega-3-Acid Ethyl Esters (Lovaza)  1 gm PO BID [facility]


   Last Admin: 01/09/18 17:47 Dose:  1 gm


Ondansetron HCl (Zofran Inj)  4 mg IVP Q6H PRN


   PRN Reason: Nausea/Vomiting


   Last Admin: 01/04/18 03:42 Dose:  4 mg


Oxybutynin Chloride (Ditropan Xl)  5 mg PO DAILY [facility]


   Last Admin: 01/09/18 10:45 Dose:  5 mg


Pantoprazole Sodium (Protonix Ec Tab)  40 mg PO BID [facility]


   Last Admin: 01/09/18 21:57 Dose:  40 mg


Polyethylene Glycol (Miralax)  17 gm PO BID [facility]


   Last Admin: 01/09/18 17:47 Dose:  17 gm


Rivaroxaban (Xarelto)  20 mg PO DAILY [facility]


Rosuvastatin Calcium (Crestor)  10 mg PO [facility]


   Last Admin: 01/09/18 21:18 Dose:  10 mg


Saccharomyces Boulardii (Florastor)  250 mg PO BID [facility]


   Last Admin: 01/09/18 17:47 Dose:  250 mg


Sitagliptin Phosphate (Januvia)  50 mg PO DAILY [facility]


   Last Admin: 01/09/18 10:31 Dose:  50 mg











- Labs


Labs: 


 





 01/10/18 07:02 





 01/10/18 07:02 





 











PT  13.4 SECONDS (9.7-12.2)  H  01/04/18  13:46    


 


INR  1.2   01/04/18  13:46    














- Constitutional


Appears: No Acute Distress





- Head Exam


Head Exam: ATRAUMATIC, NORMAL INSPECTION





- Eye Exam


Eye Exam: Normal appearance





- ENT Exam


ENT Exam: Mucous Membranes Moist





- Respiratory Exam


Respiratory Exam: NORMAL BREATHING PATTERN





- Cardiovascular Exam


Cardiovascular Exam: +S1, +S2





- GI/Abdominal Exam


GI & Abdominal Exam: Soft, Normal Bowel Sounds.  absent: Tenderness





- Extremities Exam


Extremities Exam: Normal Inspection





- Neurological Exam


Neurological Exam: Alert, Awake





- Psychiatric Exam


Psychiatric exam: Flat Affect





- Skin


Skin Exam: Normal Color, Warm





Assessment and Plan





- Assessment and Plan (Free Text)


Assessment: 





77 M with suspected Ileus vs SBO - improving





- Monitor for bowel function


   - Enema ordered 


- Medical management as per primary


- No plans for surgical intervention at this time





Yvette Ha PGY-1

## 2018-01-10 NOTE — RAD
HISTORY:

Shortness of breath.



COMPARISON:

January 08, 2018. CT chest abdomen and pelvis 



FINDINGS:



LUNGS:

Retrocardiac consolidative change.  Findings better appreciated on 

recent CT scan



PLEURA:

No significant pleural effusion identified, no pneumothorax apparent.



CARDIOVASCULAR:

Cardiomegaly.  No evidence of acute, significant cardiovascular 

disease. Position/ configuration of pacemaker

Satisfactory.



OSSEOUS STRUCTURES:

No significant abnormalities.



VISUALIZED UPPER ABDOMEN:

Normal.



OTHER FINDINGS:

None.



IMPRESSION:

No significant interval change compared to the prior examination(s).

## 2018-01-10 NOTE — PCM.URO
Urology Progress Note





- General


General: Tolerating Diet





- Subjective


Abdominal Pain: No


Flank Pain: No


Nausea: No


Vomiting: No


Voiding Well: Yes (uses condom catheter)


Hematuria: No


Urinary Urgency: No


[place]: No


Dsypnea: No


Chest Pain: No


Fever & Chills: No





- Objective


Lab Studies: Reviewed (creat=1.0 hct=36)


Lab Results Last 24 Hours: 


 Laboratory Results - last 24 hr











  01/10/18 01/10/18 01/10/18





  06:50 07:02 07:02


 


WBC   8.8 


 


RBC   4.77 


 


Hgb   12.7 


 


Hct   38.4 


 


MCV   80.4 


 


MCH   26.6 L 


 


MCHC   33.1 


 


RDW   14.4 


 


Plt Count   232 


 


MPV   9.8 


 


Neut % (Auto)   63.4 


 


Lymph % (Auto)   20.1 


 


Mono % (Auto)   10.0 


 


Eos % (Auto)   5.5 H 


 


Baso % (Auto)   1.0 


 


Neut #   5.6 


 


Lymph #   1.8 


 


Mono #   0.9 H 


 


Eos #   0.5 


 


Baso #   0.1 


 


Sodium    138


 


Potassium    3.6


 


Chloride    105


 


Carbon Dioxide    29


 


Anion Gap    8 L


 


BUN    15


 


Creatinine    1.0


 


Est GFR ( Amer)    > 60


 


Est GFR (Non-Af Amer)    > 60


 


POC Glucose (mg/dL)  225 H  


 


Random Glucose    250 H


 


Calcium    7.8 L


 


Phosphorus    3.0


 


Magnesium    1.9


 


Total Bilirubin    0.5


 


AST    19


 


ALT    31


 


Alkaline Phosphatase    63


 


Total Protein    6.1 L


 


Albumin    3.0 L


 


Globulin    3.1


 


Albumin/Globulin Ratio    1.0














  01/10/18 01/10/18 01/10/18





  11:59 16:11 20:55


 


WBC   


 


RBC   


 


Hgb   


 


Hct   


 


MCV   


 


MCH   


 


MCHC   


 


RDW   


 


Plt Count   


 


MPV   


 


Neut % (Auto)   


 


Lymph % (Auto)   


 


Mono % (Auto)   


 


Eos % (Auto)   


 


Baso % (Auto)   


 


Neut #   


 


Lymph #   


 


Mono #   


 


Eos #   


 


Baso #   


 


Sodium   


 


Potassium   


 


Chloride   


 


Carbon Dioxide   


 


Anion Gap   


 


BUN   


 


Creatinine   


 


Est GFR ( Amer)   


 


Est GFR (Non-Af Amer)   


 


POC Glucose (mg/dL)  242 H  267 H  262 H


 


Random Glucose   


 


Calcium   


 


Phosphorus   


 


Magnesium   


 


Total Bilirubin   


 


AST   


 


ALT   


 


Alkaline Phosphatase   


 


Total Protein   


 


Albumin   


 


Globulin   


 


Albumin/Globulin Ratio   











Intake & Output: 


 Intake & Output











 01/10/18 01/10/18 01/11/18





 06:59 18:59 06:59


 


Intake Total 900 917 


 


Output Total  1707 500


 


Balance 193 -573 -500


 


Intake:   


 


  Intake, IV Amount 600 437 


 


    Right Wrist 600 437 


 


  Oral 300 480 


 


Output:   


 


  Urine  1700 500


 


    Condom  1700 500


 


Other:   


 


  # Voids   


 


    Condom 1,100  


 


  # Bowel Movements 0 2 











Vital Signs: 


 Vital Signs - 24 hr











  01/09/18 01/10/18 01/10/18





  23:15 01:00 07:35


 


Temperature 98.7 F  98.1 F


 


Pulse Rate 76 69 67


 


Respiratory 18  20





Rate   


 


Blood Pressure 146/75  151/72 H


 


O2 Sat by Pulse 95  97





Oximetry   














  01/10/18 01/10/18 01/10/18





  08:17 12:00 15:40


 


Temperature   98.1 F


 


Pulse Rate 72 79 72


 


Respiratory   20





Rate   


 


Blood Pressure   134/68


 


O2 Sat by Pulse   96





Oximetry   














  01/10/18 01/10/18 01/10/18





  17:26 19:23 20:31


 


Temperature   


 


Pulse Rate 73 73 77


 


Respiratory   





Rate   


 


Blood Pressure   


 


O2 Sat by Pulse   





Oximetry   














- Physical Exam


Abdominal Exam: Soft, Non-Tender, Non-Distended


Back: No CVA Tenderness


Genitalia: Without Inflammation


Urine Color: Clear, Yellow





- Plan


Additional Information: Imp:  clinically stable.  renal mass - tumor vs cyst.  P

: review CT scan





- Date & Time of Note


Date: 01/10/18


Time: 12:15

## 2018-01-10 NOTE — CP.PCM.PN
Subjective





- Date & Time of Evaluation


Date of Evaluation: 01/10/18


Time of Evaluation: 16:27





- Subjective


Subjective: 





Medicine Progress Note: Hospitalist Service





Patient seen and examined at bedside. Per nursing no acute events overnight. 

Patient is doing well, offers no complaints at this time. Later in the 

afternoon patient was complaining about pain in the testicular region. Denies 

headaches, dizziness, cp, palpitations, sob, abdominal pain, urinary symptoms. 

Patient had a BM today after an enema. 





Objective





- Vital Signs/Intake and Output


Vital Signs (last 24 hours): 


 











Temp Pulse Resp BP Pulse Ox


 


 98.1 F   72   20   134/68   96 


 


 01/10/18 15:40  01/10/18 15:40  01/10/18 15:40  01/10/18 15:40  01/10/18 15:40








Intake and Output: 


 











 01/10/18 01/10/18





 06:59 18:59


 


Intake Total 900 917


 


Output Total  1700


 


Balance 900 -783














- Medications


Medications: 


 Current Medications





Acetaminophen (Tylenol 325mg Tab)  650 mg PO Q6 PRN


   PRN Reason: Fever >100.4 F


   Last Admin: 18 00:17 Dose:  650 mg


Albuterol/Ipratropium (Duoneb 3 Mg/0.5 Mg (3 Ml) Ud)  3 ml INH RQ6 PRN


   PRN Reason: Cough


Amiodarone HCl (Cordarone)  200 mg PO DAILY [facility]


   Last Admin: 01/10/18 10:14 Dose:  200 mg


Amlodipine Besylate (Norvasc)  10 mg PO DAILY [facility]


   Last Admin: 01/10/18 10:14 Dose:  10 mg


Aspirin (Aspirin Chewable)  81 mg PO DAILY [facility]


   Last Admin: 01/10/18 10:14 Dose:  81 mg


Dextrose (Dextrose 50% Inj)  0 ml IV STAT PRN; Protocol


   PRN Reason: Hypoglycemia Protocol


Dextrose (Glutose 15)  0 gm PO ONCE PRN; Protocol


   PRN Reason: Hypoglycemia Protocol


Glucagon (Glucagen Diagnostic Kit)  0 mg IM STAT PRN; Protocol


   PRN Reason: Hypoglycemia Protocol


Heparin Sodium (Porcine) (Heparin)  5,000 units SC Q12 [facility]


   Last Admin: 01/10/18 10:14 Dose:  5,000 units


Cefepime HCl (Maxipime Iv 1 Gm Premix)  1 gm in 50 mls @ 100 mls/hr IVPB Q12H 

[facility]


   Last Admin: 01/10/18 08:37 Dose:  100 mls/hr


Insulin Glargine (Lantus)  9 unit SC HS [facility]


   Last Admin: 18 21:17 Dose:  9 units


Insulin Human Regular (Novolin R)  0 unit SC ACHS [facility]


   PRN Reason: Protocol


   Last Admin: 01/10/18 13:35 Dose:  3 unit


Metoprolol Succinate (Toprol Xl)  50 mg PO DAILY [facility]


   Last Admin: 01/10/18 10:14 Dose:  50 mg


Metoprolol Succinate (Toprol Xl)  25 mg PO DAILY [facility]


   Last Admin: 01/10/18 10:14 Dose:  25 mg


Omega-3-Acid Ethyl Esters (Lovaza)  1 gm PO BID [facility]


   Last Admin: 01/10/18 10:15 Dose:  1 gm


Ondansetron HCl (Zofran Inj)  4 mg IVP Q6H PRN


   PRN Reason: Nausea/Vomiting


   Last Admin: 18 03:42 Dose:  4 mg


Oxybutynin Chloride (Ditropan Xl)  5 mg PO DAILY [facility]


   Last Admin: 01/10/18 10:21 Dose:  5 mg


Pantoprazole Sodium (Protonix Ec Tab)  40 mg PO BID [facility]


   Last Admin: 01/10/18 10:21 Dose:  40 mg


Polyethylene Glycol (Miralax)  17 gm PO BID [facility]


   Last Admin: 01/10/18 10:14 Dose:  17 gm


Rivaroxaban (Xarelto)  20 mg PO DAILY [facility]


Rosuvastatin Calcium (Crestor)  10 mg PO HS [facility]


   Last Admin: 18 21:18 Dose:  10 mg


Saccharomyces Boulardii (Florastor)  250 mg PO BID [facility]


   Last Admin: 01/10/18 10:14 Dose:  250 mg


Sitagliptin Phosphate (Januvia)  50 mg PO DAILY [facility]


   Last Admin: 01/10/18 10:14 Dose:  50 mg











- Labs


Labs: 


 





 01/10/18 07:02 





 01/10/18 07:02 





 











PT  13.4 SECONDS (9.7-12.2)  H  18  13:46    


 


INR  1.2   18  13:46    














- Constitutional


Appears: Non-toxic, No Acute Distress





- Head Exam


Head Exam: ATRAUMATIC, NORMAL INSPECTION, NORMOCEPHALIC





- Eye Exam


Eye Exam: EOMI, Normal appearance





- ENT Exam


ENT Exam: Mucous Membranes Moist, Normal Exam


Additional comments: 





No exudates or ulcers appreciated 





- Respiratory Exam


Respiratory Exam: Clear to Ausculation Bilateral, NORMAL BREATHING PATTERN.  

absent: Rales, Rhonchi, Wheezes





- Cardiovascular Exam


Cardiovascular Exam: REGULAR RHYTHM, +S1, +S2





- GI/Abdominal Exam


GI & Abdominal Exam: Soft, Normal Bowel Sounds.  absent: Firm, Guarding, Rigid, 

Tenderness





-  Exam


 Exam: absent: Scrotal Swelling, Testicular Tenderness


Additional comments: 





+condom hernandez





Testicles: Do not appear swollen, no perineal erythema/warmth/rash appreciated





- Extremities Exam


Extremities Exam: Normal Capillary Refill, Normal Inspection.  absent: Calf 

Tenderness


Additional comments: 





+pedal pulses





- Neurological Exam


Neurological Exam: Alert, Awake


Neuro motor strength exam: Left Upper Extremity: 0, Right Upper Extremity: 5, 

Left Lower Extremity: 3, Right Lower Extremity: 5





- Psychiatric Exam


Psychiatric exam: Normal Affect, Normal Mood





- Skin


Skin Exam: Dry, Normal Color, Warm





Assessment and Plan





- Assessment and Plan (Free Text)


Assessment: 





(1) Small bowel obstruction vs Ileus, Enteritis, Possible Coffee ground emesis


Assessment and Plan:


* Patient had moderately distended abdomen with episode of coffee ground emesis 


* CT abd/pelvis: distended stomach, mild proximal small bowel wall thickening, 

correlate clinically for possible enteritis (per discussion with Radiologist)


* General Surgery Consulted, Dr Kulkarni, help appreciated


* NGT was placed on 18, approx 550cc of coffee ground emesis in cannister, 

NGT removed 18


* GI consulted, Dr Khoury, help appreciated- Enteritis likely was causing 

transient SBO


* Patient went for EGD 18 - showed erosive esophagitis and gastritis, will f

/u biopsy results, Per GI patient will need repeat EGD in 3 months


* Per GI, okay to restart anticoagulation


* Continue Protonix 40mg PO BID


* Diet GI altered diet 


* Continue to monitor bowel function


* Miralax 17gm PO BID





(2) Sepsis


Assessment and Plan: 


* Secondary to Urinary tract infection, Pneumonia, Bacteremia


* Code Sepsis 18: 11:40 AM in ED. Criteria - elevated WBC, elevated HR, 

Fever, elevated Lactate. In the ED -> Aztreonam, Vancomycin, NS bolus.


* Patient not given fluid as 30 mg/kg given concern for congestive heart 

failure.


* Infectious Disease on consult, Dr Quinteros, help appreciated 


* Leukocytosis resolved 


* Antibiotics: Cefepime 1gm Q12H, Florastor 250mg PO BID, Zosyn 2.25 mg IVP Q8H 

(discontinued), Vancomycin 1g IVP Q24H (discontinued)


* Tylenol 650mg PO Q6H PRN for fever


* Continue Gentle hydration with NS @ 75 cc/hr


* 18 Urine Cx growing enterobacter aerogenes


* 18 Repeat Urine Cx showing no growth  


* 18 Blood Cx (10:45am): No growth x 5 days


* 18 Blood Cx (11:00am): Growing Streptococcus Mitis and Coagulase Negative 

Staphlococcus (likely contaminant)


* 1/3/18 Repeat Blood Cx showing no growth after 5 days x 2


* 1/3/18 Initial Sputum Cx contaminated


* 18 Repeat sputum cx growing yeast species, will start Diflucan 


* Throat culture showing no growth, Flu negative, urine legionella negative


* Mycoplasma IgG 1.84 (high), Mycoplasma IgM 30 (within normal limits)


* Procal 0.80, Lactic acid normalized


* Duonebs PRN shortness of breathe


* Oxygen 3 Liter for nasal cannula


* Imaging:


 * CT chest w/o contrast: No consolidative infiltrate suggested, Mild bibasilar 

peripheral subplerual septal lines noted, Bilateral renal masses, There are at 

least 2 right renal masses that appear hypodense (please see full report)


 * Bladder US: Renal cysts; right kidney 11.3x7.6x10.2cm cyst, 1.3x1.3.1.2cm 

cyst; left kidney 1.3x.10.0.9cm cyst 


 * CXR on admission: No consolidation, cardiomegaly with limited visualization 

of the left costophrenic angle (see full report)


 * CT abd/pelvis: 11.1 x 10.2 cm complex right cystic lesion with evidence of 

calcification and septation. 7mm right upper pole echogenic lesion, 

indeterminate. 17mm low-density, left upper pole kidney 


 * CXR 17: suspect left lower pneumonia, F/U PA/Lateral radiographs 

recommended


Status: Acute   Priority: High   





(3) History of Atrial fibrillation


Assessment and Plan: 


* Hx of Pacemaker (or AICD)


* CHADS score 5 -> 12.5% risk of event per yr w/o AO; HASBLED 4 -> Patient at 

high risk for major bleeding


* Aspirin 81mg PO QDaily


* Metoprolol Succinate 75mg PO QDaily


* Amiodarone 200mg PO QDaily 


* TSH and Free T4 within normal limits


* Patient sees Dr Astudillo outpatient


* Per discussion with Dr Dent, patient is supposed to be on Xarelto 20mg PO 

daily, however currently on hold 


* Cardiology on consult, Dr Chapman, help appreciated


Status: Chronic   Priority: High   





(4) Cardiomegaly


Assessment and Plan: 


* Echo from  showed normal EF and some possible diastolic dysfunction


* Repeat Echo completed, LVEF 50-55%, hypertensive heart disease, diastolic 

dysfunction, mild MR, moderate pulmonary hypertension


Status: Chronic   Priority: High   





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


Assessment and Plan: 


* Aspirin 81mg PO QD 


* Crestor 10 mg PO HS 


* PT and OT on board


Status: Chronic   Priority: Medium   





(6) Diabetes mellitus


Assessment and Plan: 


* Lantus 9 units HS 


* ISS medium dose, Accuchecks Q6H


* HgbA1C 8.1


* Januvia 50mg PO Daily


* HOLD metformin 1000mg PO BID 2/2 mild elevation in lactate


* Hypoglycemia protocol


Status: Chronic   Priority: Medium   





(7) HTN (hypertension)


Assessment and Plan: 


* BPs have been fluctuating. SBPs up to 150-160s at times


* Continue Toprol XL to 75mg PO daily 


* Norvasc 10mg PO Daily


* Monitor vitals 


Status: Chronic   Priority: High   





(8) Lipid disorder


Assessment and Plan: 


* Lipid panel- T, Chol: 83, LDL<30, HDL: 27


* Lovaza 1g PO BID 


* Crestor 10 mg PO HS 


Status: Chronic 





(9) Abnormal urinalysis


       History of Urinary Incontinence


Assessment and Plan: 


* History of Urinary Incontinence


* Straight cath in ED with 200ml of normal appearing urine given he appeared he 

had difficulty urinating in urinal


* Bladder scan PRN


* Condom catherter in place


* Urine culture growing enterobacter aerogenes, sensitivities reviewed


* Repeat UA 18: +1 ketones, +1 blood, +2 Leuk esterase, 24 WBCs


* Repeat Urine culture showing no growth 


* Contiue Oxybutynin XL 5mg PO Daily 


* CT Abdomen/Pelvis (w/o contrast): 11.11 X 10.2cm complex right cystic lesion 

with calcification and septation. nonobstructing left renal calculus. 

Distension of the stomach. Mild proximal small bowel wall thickening. Blood 

loop appear within normal limits of caliber


* Per PMD, in  patient had right renal cyst that measured 9cm


* Urology on consult, Dr Bender, help appreciated 


* CT Abd/pelvis with IV contrast done, showed multiple bilateral renal cysts 

with an 11 x 10cm simple right mid to lower pole renal cyst


Status: Acute   





(10) Hypokalemia


Assessment and Plan: 


*  Repleted, Will continue to monitor


 


(11) Prophylactic measure


Assessment and Plan: 


* Protonix 40mg PO BID


* SCDs


* Restart Heparin 5000u SC Q8H 


* Florastor 250mg PO BID 


* PT/OT eval and treat: recommend for KITTY, patient wants to go home, Script for 

Home PT left in the chart








Disposition: When medically stable for discharge, patient will go home with 

home PT. At this time we are awaiting further evaluation of right renal cyst vs 

mass.

## 2018-01-11 VITALS — DIASTOLIC BLOOD PRESSURE: 81 MMHG | HEART RATE: 83 BPM | SYSTOLIC BLOOD PRESSURE: 153 MMHG | TEMPERATURE: 98.5 F

## 2018-01-11 VITALS — OXYGEN SATURATION: 95 %

## 2018-01-11 LAB
ALBUMIN SERPL-MCNC: 3.1 G/DL (ref 3.5–5)
ALBUMIN/GLOB SERPL: 1 {RATIO} (ref 1–2.1)
ALT SERPL-CCNC: 28 U/L (ref 21–72)
AST SERPL-CCNC: 27 U/L (ref 17–59)
BASOPHILS # BLD AUTO: 0.1 K/UL (ref 0–0.2)
BASOPHILS NFR BLD: 0.6 % (ref 0–2)
BUN SERPL-MCNC: 15 MG/DL (ref 9–20)
CALCIUM SERPL-MCNC: 7.8 MG/DL (ref 8.6–10.4)
EOSINOPHIL # BLD AUTO: 0.5 K/UL (ref 0–0.7)
EOSINOPHIL NFR BLD: 5.2 % (ref 0–4)
ERYTHROCYTE [DISTWIDTH] IN BLOOD BY AUTOMATED COUNT: 13.9 % (ref 11.5–14.5)
GFR NON-AFRICAN AMERICAN: > 60
HGB BLD-MCNC: 12.8 G/DL (ref 12–18)
LYMPHOCYTES # BLD AUTO: 1.6 K/UL (ref 1–4.3)
LYMPHOCYTES NFR BLD AUTO: 17 % (ref 20–40)
MAGNESIUM SERPL-MCNC: 1.9 MG/DL (ref 1.6–2.3)
MCH RBC QN AUTO: 26.7 PG (ref 27–31)
MCHC RBC AUTO-ENTMCNC: 33.2 G/DL (ref 33–37)
MCV RBC AUTO: 80.5 FL (ref 80–94)
MONOCYTES # BLD: 0.6 K/UL (ref 0–0.8)
MONOCYTES NFR BLD: 6.5 % (ref 0–10)
NEUTROPHILS # BLD: 6.9 K/UL (ref 1.8–7)
NEUTROPHILS NFR BLD AUTO: 70.7 % (ref 50–75)
NRBC BLD AUTO-RTO: 0 % (ref 0–2)
PLATELET # BLD: 240 K/UL (ref 130–400)
PMV BLD AUTO: 9.3 FL (ref 7.2–11.7)
RBC # BLD AUTO: 4.78 MIL/UL (ref 4.4–5.9)
WBC # BLD AUTO: 9.7 K/UL (ref 4.8–10.8)

## 2018-01-11 RX ADMIN — POLYETHYLENE GLYCOL 3350 SCH GM: 17 POWDER, FOR SOLUTION ORAL at 17:24

## 2018-01-11 RX ADMIN — CEFEPIME SCH MLS/HR: 1 INJECTION, SOLUTION INTRAVENOUS at 19:07

## 2018-01-11 RX ADMIN — HUMAN INSULIN SCH UNIT: 100 INJECTION, SOLUTION SUBCUTANEOUS at 08:20

## 2018-01-11 RX ADMIN — OMEGA-3-ACID ETHYL ESTERS SCH GM: 900 CAPSULE, LIQUID FILLED ORAL at 10:08

## 2018-01-11 RX ADMIN — METOPROLOL SUCCINATE SCH MG: 50 TABLET, EXTENDED RELEASE ORAL at 10:07

## 2018-01-11 RX ADMIN — METOPROLOL SUCCINATE SCH MG: 25 TABLET, EXTENDED RELEASE ORAL at 10:07

## 2018-01-11 RX ADMIN — CEFEPIME SCH MLS/HR: 1 INJECTION, SOLUTION INTRAVENOUS at 06:18

## 2018-01-11 RX ADMIN — OMEGA-3-ACID ETHYL ESTERS SCH GM: 900 CAPSULE, LIQUID FILLED ORAL at 17:24

## 2018-01-11 RX ADMIN — Medication SCH MG: at 10:07

## 2018-01-11 RX ADMIN — HUMAN INSULIN SCH UNIT: 100 INJECTION, SOLUTION SUBCUTANEOUS at 12:51

## 2018-01-11 RX ADMIN — POLYETHYLENE GLYCOL 3350 SCH GM: 17 POWDER, FOR SOLUTION ORAL at 10:07

## 2018-01-11 RX ADMIN — HUMAN INSULIN SCH: 100 INJECTION, SOLUTION SUBCUTANEOUS at 21:51

## 2018-01-11 RX ADMIN — HUMAN INSULIN SCH UNIT: 100 INJECTION, SOLUTION SUBCUTANEOUS at 17:24

## 2018-01-11 RX ADMIN — PANTOPRAZOLE SODIUM SCH MG: 40 TABLET, DELAYED RELEASE ORAL at 10:07

## 2018-01-11 RX ADMIN — PANTOPRAZOLE SODIUM SCH MG: 40 TABLET, DELAYED RELEASE ORAL at 17:24

## 2018-01-11 RX ADMIN — Medication SCH MG: at 17:24

## 2018-01-11 NOTE — CP.PCM.PN
Subjective





- Date & Time of Evaluation


Date of Evaluation: 01/11/18


Time of Evaluation: 03:05





- Subjective


Subjective: 





dictated





Objective





- Vital Signs/Intake and Output


Vital Signs (last 24 hours): 


 











Temp Pulse Resp BP Pulse Ox


 


 98.2 F   69   20   150/78   95 


 


 01/11/18 07:35  01/11/18 07:35  01/11/18 07:35  01/11/18 07:35  01/11/18 07:35








Intake and Output: 


 











 01/11/18 01/11/18





 06:59 18:59


 


Intake Total 700 


 


Output Total 1300 800


 


Balance -600 -800














- Medications


Medications: 


 Current Medications





Acetaminophen (Tylenol 325mg Tab)  650 mg PO Q6 PRN


   PRN Reason: Fever >100.4 F


   Last Admin: 01/04/18 00:17 Dose:  650 mg


Albuterol/Ipratropium (Duoneb 3 Mg/0.5 Mg (3 Ml) Ud)  3 ml INH RQ6 PRN


   PRN Reason: Cough


Amiodarone HCl (Cordarone)  200 mg PO DAILY [facility]


   Last Admin: 01/11/18 10:07 Dose:  200 mg


Amlodipine Besylate (Norvasc)  10 mg PO DAILY [facility]


   Last Admin: 01/11/18 10:07 Dose:  10 mg


Aspirin (Aspirin Chewable)  81 mg PO DAILY [facility]


   Last Admin: 01/11/18 10:07 Dose:  81 mg


Dextrose (Dextrose 50% Inj)  0 ml IV STAT PRN; Protocol


   PRN Reason: Hypoglycemia Protocol


Dextrose (Glutose 15)  0 gm PO ONCE PRN; Protocol


   PRN Reason: Hypoglycemia Protocol


Glucagon (Glucagen Diagnostic Kit)  0 mg IM STAT PRN; Protocol


   PRN Reason: Hypoglycemia Protocol


Heparin Sodium (Porcine) (Heparin)  5,000 units SC Q12 [facility]


   Last Admin: 01/11/18 10:18 Dose:  5,000 units


Cefepime HCl (Maxipime Iv 1 Gm Premix)  1 gm in 50 mls @ 100 mls/hr IVPB Q12H 

[facility]


   Last Admin: 01/11/18 06:18 Dose:  100 mls/hr


Insulin Glargine (Lantus)  9 unit SC HS [facility]


   Last Admin: 01/10/18 21:58 Dose:  9 units


Insulin Human Regular (Novolin R)  0 unit SC ACHS [facility]


   PRN Reason: Protocol


   Last Admin: 01/11/18 12:51 Dose:  6 unit


Metoprolol Succinate (Toprol Xl)  50 mg PO DAILY [facility]


   Last Admin: 01/11/18 10:07 Dose:  50 mg


Metoprolol Succinate (Toprol Xl)  25 mg PO DAILY [facility]


   Last Admin: 01/11/18 10:07 Dose:  25 mg


Omega-3-Acid Ethyl Esters (Lovaza)  1 gm PO BID [facility]


   Last Admin: 01/11/18 10:08 Dose:  1 gm


Ondansetron HCl (Zofran Inj)  4 mg IVP Q6H [facility]


   PRN Reason: Nausea/Vomiting


   Last Admin: 01/04/18 03:42 Dose:  4 mg


Oxybutynin Chloride (Ditropan Xl)  5 mg PO DAILY [facility]


   Last Admin: 01/11/18 10:07 Dose:  5 mg


Pantoprazole Sodium (Protonix Ec Tab)  40 mg PO BID [facility]


   Last Admin: 01/11/18 10:07 Dose:  40 mg


Polyethylene Glycol (Miralax)  17 gm PO BID [facility]


   Last Admin: 01/11/18 10:07 Dose:  17 gm


Rivaroxaban (Xarelto)  20 mg PO DAILY [facility]


Rosuvastatin Calcium (Crestor)  10 mg PO HS [facility]


   Last Admin: 01/10/18 21:58 Dose:  10 mg


Saccharomyces Boulardii (Florastor)  250 mg PO BID [facility]


   Last Admin: 01/11/18 10:07 Dose:  250 mg


Sitagliptin Phosphate (Januvia)  50 mg PO DAILY [facility]


   Last Admin: 01/11/18 10:07 Dose:  50 mg











- Labs


Labs: 


 





 01/11/18 07:21 





 01/11/18 07:21 





 











PT  13.4 SECONDS (9.7-12.2)  H  01/04/18  13:46    


 


INR  1.2   01/04/18  13:46

## 2018-01-11 NOTE — CP.PCM.DIS
<Geneva Eason - Last Filed: 01/11/18 19:11>





Provider





- Provider


Date of Admission: 


01/02/18 12:44





Attending physician: 


Fabricio Castro MD





Consults: 





Cardiology: Ari


GI: Adam


General Surgery: Shad


Urology: Yulia


ID: [id number]





Time Spent in preparation of Discharge (in minutes): 31





Hospital Course





- Lab Results


Lab Results: 


 Micro Results





01/07/18 23:43   Sputum   Gram Stain - Final


01/07/18 23:43   Sputum   Sputum Culture - Final


                            Yeast Species


01/03/18 21:00   Blood   Blood Culture - Final


                            NO GROWTH AFTER 5 DAYS


01/03/18 21:00   Blood   Gram Stain - Final


                            TEST NOT PERFORMED


01/03/18 20:33   Blood   Blood Culture - Final


                            NO GROWTH AFTER 5 DAYS


01/03/18 20:33   Blood   Gram Stain - Final


                            TEST NOT PERFORMED


01/02/18 10:45   Blood   Blood Culture - Final


                            NO GROWTH AFTER 5 DAYS


01/02/18 10:45   Blood   Gram Stain - Final


                            TEST NOT PERFORMED


01/05/18 18:10   Urine,Hernandez   Urine Culture - Final


                            No Growth (<1,000 CFU/ML)


01/02/18 11:00   Blood   S.aureus & Coag-Neg Staph PNA FISH - Final


01/02/18 11:00   Blood   Blood Culture - Final


                            Streptococcus Mitis


                            Coagulase Neg Staphylococcus


01/02/18 11:00   Blood   Gram Stain - Final


01/02/18 11:58   Urine,Catheterized   Urine Culture - Final


                            Enterobacter Aerogenes


01/03/18 21:41   Sputum   Gram Stain - Final


01/03/18 21:41   Sputum   Sputum Culture - Final


01/02/18 15:14   Throat   Group A Strep Throat Culture - Final


                            NO BETA STREP GROUP A ISOLATED.





 Most Recent Lab Values











WBC  9.7 K/uL (4.8-10.8)   01/11/18  07:21    


 


RBC  4.78 Mil/uL (4.40-5.90)   01/11/18  07:21    


 


Hgb  12.8 g/dL (12.0-18.0)   01/11/18  07:21    


 


Hct  38.4 % (35.0-51.0)   01/11/18  07:21    


 


MCV  80.5 fL (80.0-94.0)   01/11/18  07:21    


 


MCH  26.7 pg (27.0-31.0)  L  01/11/18  07:21    


 


MCHC  33.2 g/dL (33.0-37.0)   01/11/18  07:21    


 


RDW  13.9 % (11.5-14.5)   01/11/18  07:21    


 


Plt Count  240 K/uL (130-400)   01/11/18  07:21    


 


MPV  9.3 fL (7.2-11.7)   01/11/18  07:21    


 


Neut % (Auto)  70.7 % (50.0-75.0)   01/11/18  07:21    


 


Lymph % (Auto)  17.0 % (20.0-40.0)  L  01/11/18  07:21    


 


Mono % (Auto)  6.5 % (0.0-10.0)   01/11/18  07:21    


 


Eos % (Auto)  5.2 % (0.0-4.0)  H  01/11/18  07:21    


 


Baso % (Auto)  0.6 % (0.0-2.0)   01/11/18  07:21    


 


Neut #  6.9 K/uL (1.8-7.0)   01/11/18  07:21    


 


Lymph #  1.6 K/uL (1.0-4.3)   01/11/18  07:21    


 


Mono #  0.6 K/uL (0.0-0.8)   01/11/18  07:21    


 


Eos #  0.5 K/uL (0.0-0.7)   01/11/18  07:21    


 


Baso #  0.1 K/uL (0.0-0.2)   01/11/18  07:21    


 


Neutrophils % (Manual)  84 % (50-75)  H  01/04/18  13:46    


 


Band Neutrophils %  1 % (0-2)   01/04/18  06:56    


 


Lymphocytes % (Manual)  9 % (20-40)  L  01/04/18  13:46    


 


Monocytes % (Manual)  6 % (0-10)   01/04/18  13:46    


 


Eosinophils % (Manual)  1 % (0-4)   01/04/18  13:46    


 


Platelet Estimate  Normal  (NORMAL)   01/04/18  13:46    


 


RBC Morphology  Normal   01/02/18  11:19    


 


Ovalocytes  Slight   01/04/18  13:46    


 


India Cells  Slight   01/04/18  13:46    


 


PT  13.4 SECONDS (9.7-12.2)  H  01/04/18  13:46    


 


INR  1.2   01/04/18  13:46    


 


pO2  69 mm/Hg (30-55)  H  01/02/18  12:45    


 


VBG pH  7.26  (7.32-7.43)  L  01/02/18  12:45    


 


VBG pCO2  33 mmHg (40-60)  L  01/02/18  12:45    


 


VBG HCO3  16.0 mmol/L  01/02/18  12:45    


 


VBG Total CO2  15.8 mmol/L (22-28)  L  01/02/18  12:45    


 


VBG O2 Sat (Calc)  90.6 % (40-65)  H  01/02/18  12:45    


 


VBG Base Excess  -11.2 mmol/L (0.0-2.0)  L  01/02/18  12:45    


 


VBG Potassium  6.1 mmol/L (3.6-5.2)  H  01/02/18  12:45    


 


Sodium  143.0 mmol/l (132-148)   01/02/18  12:45    


 


Chloride  125.0 mmol/L ()  H  01/02/18  12:45    


 


Glucose  153 mg/dl ()  H  01/02/18  12:45    


 


Lactate  1.7 mmol/L (0.7-2.1)   01/02/18  12:45    


 


Sodium  131 mmol/L (132-148)  L  01/11/18  07:21    


 


Potassium  3.2 mmol/L (3.6-5.2)  L  01/11/18  07:21    


 


Chloride  97 mmol/L ()  L  01/11/18  07:21    


 


Carbon Dioxide  29 mmol/L (22-30)   01/11/18  07:21    


 


Anion Gap  8  (10-20)  L  01/11/18  07:21    


 


BUN  15 mg/dL (9-20)   01/11/18  07:21    


 


Creatinine  0.9 mg/dL (0.8-1.5)   01/11/18  07:21    


 


Est GFR ( Amer)  > 60   01/11/18  07:21    


 


Est GFR (Non-Af Amer)  > 60   01/11/18  07:21    


 


POC Glucose (mg/dL)  214 mg/dL ()  H  01/11/18  06:32    


 


Random Glucose  227 mg/dL ()  H  01/11/18  07:21    


 


Hemoglobin A1c  8.1 % (4.2-6.5)  H  01/02/18  16:31    


 


Lactic Acid  1.4 mmol/L (0.7-2.1)   01/03/18  07:40    


 


Calcium  7.8 mg/dl (8.6-10.4)  L  01/11/18  07:21    


 


Phosphorus  3.2 mg/dL (2.5-4.5)   01/11/18  07:21    


 


Magnesium  1.9 mg/dL (1.6-2.3)   01/11/18  07:21    


 


Total Bilirubin  0.5 mg/dL (0.2-1.3)   01/11/18  07:21    


 


AST  27 U/L (17-59)   01/11/18  07:21    


 


ALT  28 U/L (21-72)   01/11/18  07:21    


 


Alkaline Phosphatase  63 U/L ()   01/11/18  07:21    


 


Troponin I  0.0370 ng/mL (0.00-0.120)   01/02/18  11:54    


 


Total Protein  6.2 g/dL (6.3-8.3)  L  01/11/18  07:21    


 


Albumin  3.1 g/dL (3.5-5.0)  L  01/11/18  07:21    


 


Globulin  3.2 gm/dL (2.2-3.9)   01/11/18  07:21    


 


Albumin/Globulin Ratio  1.0  (1.0-2.1)   01/11/18  07:21    


 


Triglycerides  83 mg/dL (0-149)   01/03/18  07:40    


 


Cholesterol  83 mg/dL (0-199)   01/03/18  07:40    


 


LDL Cholesterol Direct  < 30 mg/dL (0-129)   01/03/18  07:40    


 


HDL Cholesterol  27 mg/dL (30-70)  L  01/03/18  07:40    


 


Procalcitonin  0.80 NG/ML (0.19-0.49)  H  01/02/18  14:31    


 


Free T4  1.29 ng/dL (0.78-2.19)   01/02/18  16:31    


 


TSH 3rd Generation  1.45 mIU/L (0.46-4.68)   01/03/18  07:40    


 


Venous Blood Potassium  6.1 mmol/L (3.6-5.2)  H  01/02/18  12:45    


 


Urine Color  Straw  (YELLOW)   01/05/18  17:53    


 


Urine Clarity  Clear  (Clear)   01/05/18  17:53    


 


Urine pH  6.0  (5.0-8.0)   01/05/18  17:53    


 


Ur Specific Gravity  1.012  (1.003-1.030)   01/05/18  17:53    


 


Urine Protein  Negative mg/dL (NEGATIVE)   01/05/18  17:53    


 


Urine Glucose (UA)  Normal mg/dL (Normal)   01/05/18  17:53    


 


Urine Ketones  1+ mg/dL (NEGATIVE)  H  01/05/18  17:53    


 


Urine Blood  1+  (NEGATIVE)  H  01/05/18  17:53    


 


Urine Nitrate  Negative  (NEGATIVE)   01/05/18  17:53    


 


Urine Bilirubin  Negative  (NEGATIVE)   01/05/18  17:53    


 


Urine Urobilinogen  Normal mg/dL (0.2-1.0)   01/05/18  17:53    


 


Ur Leukocyte Esterase  2+ Jefry/uL (Negative)  H  01/05/18  17:53    


 


Urine WBC (Auto)  24 /hpf (0-5)  H  01/05/18  17:53    


 


Urine RBC (Auto)  5 /hpf (0-3)  H  01/05/18  17:53    


 


Ur Squamous Epith Cells  < 1 /hpf (0-5)   01/02/18  11:29    


 


Random Vancomycin  7.51 ug/mL  01/03/18  07:40    


 


Influenza Typ A,B (EIA)  Negative for flu a/b  (NEGATIVE)   01/02/18  12:44    


 


Ur L.pneumophila Ag  Negative  (NEGATIVE)   01/02/18  16:45    


 


Mycoplasma pneumon IgG  1.89  (<=0.90)  H  01/02/18  16:31    


 


Mycoplasma pneumon IgM  30 U/mL (<770)   01/02/18  16:31    


 


Ur Strep pneumoniae Ag  Not detected   01/02/18  16:45    


 


Blood Type  O POSITIVE   01/04/18  16:49    


 


Antibody Screen  Negative   01/04/18  16:49    














- Hospital Course


Hospital Course: 





Mr Barillas is a 77 year old  male who was brought to the ER by family 

for weakness and subjective fever; his daughter provided the history of present 

illness. The patient has a past medical history of AFib, CVA with residual left 

sided weakness, DM, HTN, and HLD. Per daughter the patient took a shower 

yesterday and following the shower the patient began to have chills. He usually 

ambulates with a cane, however he was too weak to ambulate and spent much of 

the day in bed. He was also warm to the touch however a temperature was not 

taken at home. The patient's wife was sick with a cold a few days prior. He has 

urinary incontinence at baseline and had an episode of urinary incontinence 

yesterday as well. There was no cough, diarrhea, recent travel. There was no 

chest pain, abdominal pain, nausea, vomiting, no new focal deficits.





Patient was a code Sepsis in the ED. Was found to have leukocytosis, tachycardia

, fever, and elevated Lactate. Patient was started on broad spectrum 

antibiotics. ID was consulted. CT chest showed no consolidative infiltrate 

suggested, Mild bibasilar peripheral subplerual septal lines noted, Bilateral 

renal masses, There are at least 2 right renal masses that appear hypodense (

please see full report). At that time patient was found to have a urine culture 

that grew enterobacter aerogenes. Blood cultures drawn from January 2nd, at 

that time had one bottle was positive for Streptococcus Mitis. Repeat urine 

culture was negative. Repeat blood cultures were also negative x 2. Sputum 

culture grew back yeast species. Patient was started on Diflucan. 





Patient had two episodes of coffee ground emesis during hospital stay. 

Anticoagulation was held, patient was made NPO. CT abd/pelvis was notable for a 

distended stomach. General surgery was consulted for possible SBO/Ileus. NGT 

was placed with immediate improvement in symptoms. GI was consulted. Patient 

was taken for EGD which showed erosive esophagitis and gastritis (see full 

report). Biopsy results pending. Hgb remained stable. Patient diet was advanced 

slowly. Per GI, it was okay to restart Oral anticoagulation. 





Patient has a CT abd/pelvis done that showed 11.1 x 10.2 cm complex right 

cystic lesion with evidence of calcification and septation. 7mm right upper 

pole echogenic lesion, indeterminate. 17mm low-density, left upper pole kidney. 

Upon discussion with PMD, patient had a 9cm right Renal cyst in 2011. Urology 

was consulted. CT abd/pelvis was repeated with IV and PO contrast which showed 

an 11cm simple cyst on the right kidney. Urology recommending outpatient 

cystoscopy. For history of atrial fibrillation, cardiology was consulted. 

Amiodarone was increased to 200mg PO daily. Echo showed LVEF 50-55%, 

hypertensive heart disease, diastolic dysfunction, mild MR, moderate pulmonary 

hypertension. Patient was found to have elevated BPs during hospital stay. 

Metoprolol was increased to 75mg. 





PT evaluated the patient and recommended [facility]IT[facility] for placement. Patient and Patient'

s family decided that they wanted to take him home. Patient was given referral 

for Home PT. On day of discharge, patient was doing well, offered no 

complaints. Tolerating diet. Denies headaches, dizziness, cp, palpitations, sob

, urinary symptoms, changes in bowel habits. Patient was medically stable and 

discharged home with instructions to follow up with PMD within 7 days. Patient 

to Continue Vilantin 200mg PO BID x 5 days. Will need repeat Endoscopy in 3 

months. All medications were reconciled. 





Discharge Medication:


Amiodarone 200 mg PO DAILY #30 tab


amLODIPine 10 mg PO DAILY #30 tab


Aspirin 81 mg PO DAILY #30 chew


Atorvastatin 20 mg PO HS #30 tab


Cefpodoxime 200 mg PO BID 5 Days #10 tab


Fluconazole 100 mg PO DAILY #20 tab


Glimepiride  4 mg PO DAILY #30 tablet


Insulin Glargine, Recombina 10 unit SC HS #10 vial


metFORMIN  1,000 mg PO BID #60 tab


Metoprolol Succinate  25 mg PO DAILY #30 tab


Metoprolol Succinate 50 mg PO DAILY #30 tab


Omega-3-Acid Ethyl Esters 1 GM  1 gm PO BID #60 sgl


Oxybutynin XL  5 mg PO DAILY #30 ter


Pantoprazole  40 mg PO BID #60 ect


Potassium Chloride 10 meq PO DAILY #30 tab.er.prt


Rivaroxaban  20 mg PO DAILY #30 tab


Rosuvastatin Calcium  10 mg PO HS #30 tab


Saccharomyces Boulardi  250 mg PO BID #70 cap


Januvia 100mg PO daily #30


Insulin syringe and needles 









































 























 

















Discharge Exam





- Additional Findings


Additional findings: 





- Constitutional


Appears: Non-toxic, No Acute Distress





- Head Exam


Head Exam: ATRAUMATIC, NORMAL INSPECTION, NORMOCEPHALIC





- Eye Exam


Eye Exam: EOMI, Normal appearance





- ENT Exam


ENT Exam: Mucous Membranes Moist, Normal Exam


Additional comments: 





No exudates or ulcers appreciated 





- Respiratory Exam


Respiratory Exam: Clear to Ausculation Bilateral, NORMAL BREATHING PATTERN.  

absent: Rales, Rhonchi, Wheezes





- Cardiovascular Exam


Cardiovascular Exam: REGULAR RHYTHM, +S1, +S2





- GI/Abdominal Exam


GI & Abdominal Exam: Soft, Normal Bowel Sounds.  absent: Firm, Guarding, Rigid, 

Tenderness





-  Exam


 Exam: absent: Scrotal Swelling, Testicular Tenderness


Additional comments: 





+condom hernandez





Testicles: Do not appear swollen, no perineal erythema/warmth/rash appreciated





- Extremities Exam


Extremities Exam: Normal Capillary Refill, Normal Inspection.  absent: Calf 

Tenderness


Additional comments: 





+pedal pulses





- Neurological Exam


Neurological Exam: Alert, Awake


Neuro motor strength exam: Left Upper Extremity: 0, Right Upper Extremity: 5, 

Left Lower Extremity: 3, Right Lower Extremity: 5





- Psychiatric Exam


Psychiatric exam: Normal Affect, Normal Mood





- Skin


Skin Exam: Dry, Normal Color, Warm





Discharge Plan





- Discharge Medications


Prescriptions: 


Amiodarone [Cordarone] 200 mg PO DAILY #30 tab


amLODIPine [Norvasc] 10 mg PO DAILY #30 tab


Aspirin [Aspirin Chewable] 81 mg PO DAILY #30 chew


Atorvastatin [Lipitor] 20 mg PO HS #30 tab


Cefpodoxime [Vantin] 200 mg PO BID 5 Days #10 tab


Fluconazole [Diflucan] 100 mg PO DAILY #20 tab


Glimepiride [Amaryl] 4 mg PO DAILY #30 tablet


Insulin Glargine, Recombina [Lantus] 10 unit SC HS #10 vial


metFORMIN [glucOPHAGE] 1,000 mg PO BID #60 tab


Metoprolol Succinate [Toprol XL] 25 mg PO DAILY #30 tab


Metoprolol Succinate [Toprol XL] 50 mg PO DAILY #30 tab


Omega-3-Acid Ethyl Esters 1 GM [Lovaza] 1 gm PO BID #60 sgl


Oxybutynin XL [Ditropan XL] 5 mg PO DAILY #30 ter


Pantoprazole [Protonix EC Tab] 40 mg PO BID #60 ect


Potassium Chloride 10 meq PO DAILY #30 tab.er.prt


Rivaroxaban [Xarelto] 20 mg PO DAILY #30 tab


Rosuvastatin Calcium [Crestor] 10 mg PO HS #30 tab


Saccharomyces Boulardi [Florastor] 250 mg PO BID #70 cap





- Follow Up Plan


Condition: STABLE


Disposition: HOME/ ROUTINE


Additional Instructions: 


1. Schedule F/U with Primary Care Dr Dent in 7 days


2. Through Dr Dent obtain referral for Dr Cathy Bender for outpatient 

cystoscopy


3. F/U with you Cardiologist through Dr Dent


4. Through Dr Dent obtain repeat CT Abdomen/Pelvis with contrast to make 

sure R Renal Cysts have not changed


5. Continue Vilantin 200mg PO BID x 5 days


6. Please follow up with GI, you will need repeat Endoscopy in 3 months





Referrals: 


Victoriano Medina MD [Staff Provider] - 


Cathy Bender MD [Staff Provider] - 


Charbel Dent MD [Staff Provider] - 7 Days





<Fabricio Castro - Last Filed: 01/11/18 19:39>





Provider





- Provider


Date of Admission: 


01/02/18 12:44





Attending physician: 


Fabricio Castro MD








Hospital Course





- Lab Results


Lab Results: 


 Micro Results





01/07/18 23:43   Sputum   Gram Stain - Final


01/07/18 23:43   Sputum   Sputum Culture - Final


                            Yeast Species


01/03/18 21:00   Blood   Blood Culture - Final


                            NO GROWTH AFTER 5 DAYS


01/03/18 21:00   Blood   Gram Stain - Final


                            TEST NOT PERFORMED


01/03/18 20:33   Blood   Blood Culture - Final


                            NO GROWTH AFTER 5 DAYS


01/03/18 20:33   Blood   Gram Stain - Final


                            TEST NOT PERFORMED


01/02/18 10:45   Blood   Blood Culture - Final


                            NO GROWTH AFTER 5 DAYS


01/02/18 10:45   Blood   Gram Stain - Final


                            TEST NOT PERFORMED


01/05/18 18:10   Urine,Hernandez   Urine Culture - Final


                            No Growth (<1,000 CFU/ML)


01/02/18 11:00   Blood   S.aureus & Coag-Neg Staph PNA FISH - Final


01/02/18 11:00   Blood   Blood Culture - Final


                            Streptococcus Mitis


                            Coagulase Neg Staphylococcus


01/02/18 11:00   Blood   Gram Stain - Final


01/02/18 11:58   Urine,Catheterized   Urine Culture - Final


                            Enterobacter Aerogenes


01/03/18 21:41   Sputum   Gram Stain - Final


01/03/18 21:41   Sputum   Sputum Culture - Final


01/02/18 15:14   Throat   Group A Strep Throat Culture - Final


                            NO BETA STREP GROUP A ISOLATED.





 Most Recent Lab Values











WBC  9.7 K/uL (4.8-10.8)   01/11/18  07:21    


 


RBC  4.78 Mil/uL (4.40-5.90)   01/11/18  07:21    


 


Hgb  12.8 g/dL (12.0-18.0)   01/11/18  07:21    


 


Hct  38.4 % (35.0-51.0)   01/11/18  07:21    


 


MCV  80.5 fL (80.0-94.0)   01/11/18  07:21    


 


MCH  26.7 pg (27.0-31.0)  L  01/11/18  07:21    


 


MCHC  33.2 g/dL (33.0-37.0)   01/11/18  07:21    


 


RDW  13.9 % (11.5-14.5)   01/11/18  07:21    


 


Plt Count  240 K/uL (130-400)   01/11/18  07:21    


 


MPV  9.3 fL (7.2-11.7)   01/11/18  07:21    


 


Neut % (Auto)  70.7 % (50.0-75.0)   01/11/18  07:21    


 


Lymph % (Auto)  17.0 % (20.0-40.0)  L  01/11/18  07:21    


 


Mono % (Auto)  6.5 % (0.0-10.0)   01/11/18  07:21    


 


Eos % (Auto)  5.2 % (0.0-4.0)  H  01/11/18  07:21    


 


Baso % (Auto)  0.6 % (0.0-2.0)   01/11/18  07:21    


 


Neut #  6.9 K/uL (1.8-7.0)   01/11/18  07:21    


 


Lymph #  1.6 K/uL (1.0-4.3)   01/11/18  07:21    


 


Mono #  0.6 K/uL (0.0-0.8)   01/11/18  07:21    


 


Eos #  0.5 K/uL (0.0-0.7)   01/11/18  07:21    


 


Baso #  0.1 K/uL (0.0-0.2)   01/11/18  07:21    


 


Neutrophils % (Manual)  84 % (50-75)  H  01/04/18  13:46    


 


Band Neutrophils %  1 % (0-2)   01/04/18  06:56    


 


Lymphocytes % (Manual)  9 % (20-40)  L  01/04/18  13:46    


 


Monocytes % (Manual)  6 % (0-10)   01/04/18  13:46    


 


Eosinophils % (Manual)  1 % (0-4)   01/04/18  13:46    


 


Platelet Estimate  Normal  (NORMAL)   01/04/18  13:46    


 


RBC Morphology  Normal   01/02/18  11:19    


 


Ovalocytes  Slight   01/04/18  13:46    


 


[place] Cells  Slight   01/04/18  13:46    


 


PT  13.4 SECONDS (9.7-12.2)  H  01/04/18  13:46    


 


INR  1.2   01/04/18  13:46    


 


pO2  69 mm/Hg (30-55)  H  01/02/18  12:45    


 


VBG pH  7.26  (7.32-7.43)  L  01/02/18  12:45    


 


VBG pCO2  33 mmHg (40-60)  L  01/02/18  12:45    


 


VBG HCO3  16.0 mmol/L  01/02/18  12:45    


 


VBG Total CO2  15.8 mmol/L (22-28)  L  01/02/18  12:45    


 


VBG O2 Sat (Calc)  90.6 % (40-65)  H  01/02/18  12:45    


 


VBG Base Excess  -11.2 mmol/L (0.0-2.0)  L  01/02/18  12:45    


 


VBG Potassium  6.1 mmol/L (3.6-5.2)  H  01/02/18  12:45    


 


Sodium  143.0 mmol/l (132-148)   01/02/18  12:45    


 


Chloride  125.0 mmol/L ()  H  01/02/18  12:45    


 


Glucose  153 mg/dl ()  H  01/02/18  12:45    


 


Lactate  1.7 mmol/L (0.7-2.1)   01/02/18  12:45    


 


Sodium  131 mmol/L (132-148)  L  01/11/18  07:21    


 


Potassium  3.2 mmol/L (3.6-5.2)  L  01/11/18  07:21    


 


Chloride  97 mmol/L ()  L  01/11/18  07:21    


 


Carbon Dioxide  29 mmol/L (22-30)   01/11/18  07:21    


 


Anion Gap  8  (10-20)  L  01/11/18  07:21    


 


BUN  15 mg/dL (9-20)   01/11/18  07:21    


 


Creatinine  0.9 mg/dL (0.8-1.5)   01/11/18  07:21    


 


Est GFR ( Amer)  > 60   01/11/18  07:21    


 


Est GFR (Non-Af Amer)  > 60   01/11/18  07:21    


 


POC Glucose (mg/dL)  282 mg/dL ()  H  01/11/18  16:27    


 


Random Glucose  227 mg/dL ()  H  01/11/18  07:21    


 


Hemoglobin A1c  8.1 % (4.2-6.5)  H  01/02/18  16:31    


 


Lactic Acid  1.4 mmol/L (0.7-2.1)   01/03/18  07:40    


 


Calcium  7.8 mg/dl (8.6-10.4)  L  01/11/18  07:21    


 


Phosphorus  3.2 mg/dL (2.5-4.5)   01/11/18  07:21    


 


Magnesium  1.9 mg/dL (1.6-2.3)   01/11/18  07:21    


 


Total Bilirubin  0.5 mg/dL (0.2-1.3)   01/11/18  07:21    


 


AST  27 U/L (17-59)   01/11/18  07:21    


 


ALT  28 U/L (21-72)   01/11/18  07:21    


 


Alkaline Phosphatase  63 U/L ()   01/11/18  07:21    


 


Troponin I  0.0370 ng/mL (0.00-0.120)   01/02/18  11:54    


 


Total Protein  6.2 g/dL (6.3-8.3)  L  01/11/18  07:21    


 


Albumin  3.1 g/dL (3.5-5.0)  L  01/11/18  07:21    


 


Globulin  3.2 gm/dL (2.2-3.9)   01/11/18  07:21    


 


Albumin/Globulin Ratio  1.0  (1.0-2.1)   01/11/18  07:21    


 


Triglycerides  83 mg/dL (0-149)   01/03/18  07:40    


 


Cholesterol  83 mg/dL (0-199)   01/03/18  07:40    


 


LDL Cholesterol Direct  < 30 mg/dL (0-129)   01/03/18  07:40    


 


HDL Cholesterol  27 mg/dL (30-70)  L  01/03/18  07:40    


 


Procalcitonin  0.80 NG/ML (0.19-0.49)  H  01/02/18  14:31    


 


Free T4  1.29 ng/dL (0.78-2.19)   01/02/18  16:31    


 


TSH 3rd Generation  1.45 mIU/L (0.46-4.68)   01/03/18  07:40    


 


Venous Blood Potassium  6.1 mmol/L (3.6-5.2)  H  01/02/18  12:45    


 


Urine Color  Straw  (YELLOW)   01/05/18  17:53    


 


Urine Clarity  Clear  (Clear)   01/05/18  17:53    


 


Urine pH  6.0  (5.0-8.0)   01/05/18  17:53    


 


Ur Specific Gravity  1.012  (1.003-1.030)   01/05/18  17:53    


 


Urine Protein  Negative mg/dL (NEGATIVE)   01/05/18  17:53    


 


Urine Glucose (UA)  Normal mg/dL (Normal)   01/05/18  17:53    


 


Urine Ketones  1+ mg/dL (NEGATIVE)  H  01/05/18  17:53    


 


Urine Blood  1+  (NEGATIVE)  H  01/05/18  17:53    


 


Urine Nitrate  Negative  (NEGATIVE)   01/05/18  17:53    


 


Urine Bilirubin  Negative  (NEGATIVE)   01/05/18  17:53    


 


Urine Urobilinogen  Normal mg/dL (0.2-1.0)   01/05/18  17:53    


 


Ur Leukocyte Esterase  2+ Jefry/uL (Negative)  H  01/05/18  17:53    


 


Urine WBC (Auto)  24 /hpf (0-5)  H  01/05/18  17:53    


 


Urine RBC (Auto)  5 /hpf (0-3)  H  01/05/18  17:53    


 


Ur Squamous Epith Cells  < 1 /hpf (0-5)   01/02/18  11:29    


 


Random Vancomycin  7.51 ug/mL  01/03/18  07:40    


 


Influenza Typ A,B (EIA)  Negative for flu a/b  (NEGATIVE)   01/02/18  12:44    


 


Ur L.pneumophila Ag  Negative  (NEGATIVE)   01/02/18  16:45    


 


Mycoplasma pneumon IgG  1.89  (<=0.90)  H  01/02/18  16:31    


 


Mycoplasma pneumon IgM  30 U/mL (<770)   01/02/18  16:31    


 


Ur Strep pneumoniae Ag  Not detected   01/02/18  16:45    


 


Blood Type  O POSITIVE   01/04/18  16:49    


 


Antibody Screen  Negative   01/04/18  16:49    














Attending/Attestation





- Attestation


I have personally seen and examined this patient.: Yes


I have fully participated in the care of the patient.: Yes


I have reviewed all pertinent clinical information, including history, physical 

exam and plan: Yes


Notes (Text): 





01/11/18 19:37


Patient was seen and examined shortly after resident.





Exam, assessment and plan and discharge instructions were gone over with the 

resident.





Please note that the patient should NOT be on Crestor (Rosuvastatin) and I 

called patient's wife's cell phone to explain this to her but she did not speak 

English therfore she gave the phone to her grand daughter Jen and I 

explained it to her and she expressed understanding.





Fabricio Castro D.O.

## 2018-01-11 NOTE — CP.PCM.PN
Subjective





- Date & Time of Evaluation


Date of Evaluation: 01/11/18


Time of Evaluation: 08:25





- Subjective


Subjective: 





Surgical Progress Note:





Patient was seen and examined at bedside in the AM.  Per nurse no acute events 

overnight.  Per nurse the patient has not had a bowel movement yet.  Patient 

denies nausea or vomiting. 





Objective





- Vital Signs/Intake and Output


Vital Signs (last 24 hours): 


 











Temp Pulse Resp BP Pulse Ox


 


 98.2 F   69   20   150/78   95 


 


 01/11/18 07:35  01/11/18 07:35  01/11/18 07:35  01/11/18 07:35  01/11/18 07:35








Intake and Output: 


 











 01/11/18 01/11/18





 06:59 18:59


 


Intake Total 700 


 


Output Total 1300 


 


Balance -600 














- Medications


Medications: 


 Current Medications





Acetaminophen (Tylenol 325mg Tab)  650 mg PO Q6 PRN


   PRN Reason: Fever >100.4 F


   Last Admin: 01/04/18 00:17 Dose:  650 mg


Albuterol/Ipratropium (Duoneb 3 Mg/0.5 Mg (3 Ml) Ud)  3 ml INH RQ6 PRN


   PRN Reason: Cough


Amiodarone HCl (Cordarone)  200 mg PO DAILY [facility]


   Last Admin: 01/10/18 10:14 Dose:  200 mg


Amlodipine Besylate (Norvasc)  10 mg PO DAILY [facility]


   Last Admin: 01/10/18 10:14 Dose:  10 mg


Aspirin (Aspirin Chewable)  81 mg PO DAILY [facility]


   Last Admin: 01/10/18 10:14 Dose:  81 mg


Dextrose (Dextrose 50% Inj)  0 ml IV STAT PRN; Protocol


   PRN Reason: Hypoglycemia Protocol


Dextrose (Glutose 15)  0 gm PO ONCE PRN; Protocol


   PRN Reason: Hypoglycemia Protocol


Glucagon (Glucagen Diagnostic Kit)  0 mg IM STAT PRN; Protocol


   PRN Reason: Hypoglycemia Protocol


Heparin Sodium (Porcine) (Heparin)  5,000 units SC Q12 [facility]


   Last Admin: 01/10/18 21:58 Dose:  5,000 units


Cefepime HCl (Maxipime Iv 1 Gm Premix)  1 gm in 50 mls @ 100 mls/hr IVPB Q12H 

[facility]


   Last Admin: 01/11/18 06:18 Dose:  100 mls/hr


Insulin Glargine (Lantus)  9 unit SC HS [facility]


   Last Admin: 01/10/18 21:58 Dose:  9 units


Insulin Human Regular (Novolin R)  0 unit SC ACHS [facility]


   PRN Reason: Protocol


   Last Admin: 01/11/18 08:20 Dose:  3 unit


Metoprolol Succinate (Toprol Xl)  50 mg PO DAILY [facility]


   Last Admin: 01/10/18 10:14 Dose:  50 mg


Metoprolol Succinate (Toprol Xl)  25 mg PO DAILY [facility]


   Last Admin: 01/10/18 10:14 Dose:  25 mg


Omega-3-Acid Ethyl Esters (Lovaza)  1 gm PO BID [facility]


   Last Admin: 01/10/18 17:06 Dose:  1 gm


Ondansetron HCl (Zofran Inj)  4 mg IVP Q6H PRN


   PRN Reason: Nausea/Vomiting


   Last Admin: 01/04/18 03:42 Dose:  4 mg


Oxybutynin Chloride (Ditropan Xl)  5 mg PO DAILY [facility]


   Last Admin: 01/10/18 10:21 Dose:  5 mg


Pantoprazole Sodium (Protonix Ec Tab)  40 mg PO BID [facility]


   Last Admin: 01/10/18 17:06 Dose:  40 mg


Polyethylene Glycol (Miralax)  17 gm PO BID [facility]


   Last Admin: 01/10/18 17:11 Dose:  Not Given


Rivaroxaban (Xarelto)  20 mg PO DAILY [facility]


Rosuvastatin Calcium (Crestor)  10 mg PO HS [facility]


   Last Admin: 01/10/18 21:58 Dose:  10 mg


Saccharomyces Boulardii (Florastor)  250 mg PO BID [facility]


   Last Admin: 01/10/18 17:06 Dose:  250 mg


Sitagliptin Phosphate (Januvia)  50 mg PO DAILY [facility]


   Last Admin: 01/10/18 10:14 Dose:  50 mg











- Labs


Labs: 


 





 01/11/18 07:21 





 01/10/18 07:02 





 











PT  13.4 SECONDS (9.7-12.2)  H  01/04/18  13:46    


 


INR  1.2   01/04/18  13:46    














- Constitutional


Appears: No Acute Distress





- Head Exam


Head Exam: ATRAUMATIC, NORMAL INSPECTION





- Eye Exam


Eye Exam: EOMI, Normal appearance





- ENT Exam


ENT Exam: Mucous Membranes Moist





- Respiratory Exam


Respiratory Exam: NORMAL BREATHING PATTERN





- Cardiovascular Exam


Cardiovascular Exam: +S1, +S2





- GI/Abdominal Exam


GI & Abdominal Exam: Soft, Normal Bowel Sounds.  absent: Tenderness





- Extremities Exam


Extremities Exam: Normal Inspection





- Neurological Exam


Neurological Exam: Alert, Awake





- Psychiatric Exam


Psychiatric exam: Normal Affect, Normal Mood





- Skin


Skin Exam: Normal Color, Warm





Assessment and Plan





- Assessment and Plan (Free Text)


Assessment: 





77 M with suspected Ileus vs SBO - improving





- Monitor for bowel function


- Medical management as per primary


- No plans for surgical intervention at this time





Yvette Ha PGY-1

## 2018-01-12 NOTE — PN
DATE:



SUBJECTIVE:  The patient is doing better.  He is now starting to speak.  He

is the one with left hemiparesis, hemiplegia.  He is breathing easier and

he appears to be in a better shape.



PHYSICAL EXAMINATION:

HEENT:  Head is atraumatic, normocephalic.

NECK:  Supple.

LUNGS:  Clear.  Occasional rhonchi and wheeze.

HEART:  S1, S2, is regular.

ABDOMEN:  Soft, nontender.  No guarding, no rigidity present.

EXTREMITIES:  Left hemiplegia present.



LABORATORY DATA:  Labs are noted.  Labs show white count is 9.7 today, BUN

is 8, creatinine is 0.9.



The patient has had x-ray done and the x-ray does not show no significant

interval change compared to prior exam, but the patient appears stable and

he will be going on Vantin.  I told the resident to put Vantin 200 b.i.d.

for next five days.  The patient to go for rehab.





__________________________________________

Petey Quinteros MD



DD:  01/11/2018 15:58:37

DT:  01/11/2018 18:50:10

Job # [id number]